# Patient Record
Sex: MALE | Race: WHITE | NOT HISPANIC OR LATINO | Employment: FULL TIME | URBAN - METROPOLITAN AREA
[De-identification: names, ages, dates, MRNs, and addresses within clinical notes are randomized per-mention and may not be internally consistent; named-entity substitution may affect disease eponyms.]

---

## 2017-01-21 ENCOUNTER — HOSPITAL ENCOUNTER (EMERGENCY)
Facility: HOSPITAL | Age: 42
Discharge: HOME | End: 2017-01-21
Attending: EMERGENCY MEDICINE
Payer: COMMERCIAL

## 2017-01-21 VITALS
WEIGHT: 160 LBS | TEMPERATURE: 98 F | SYSTOLIC BLOOD PRESSURE: 119 MMHG | DIASTOLIC BLOOD PRESSURE: 77 MMHG | OXYGEN SATURATION: 98 % | HEIGHT: 66 IN | RESPIRATION RATE: 16 BRPM | BODY MASS INDEX: 25.71 KG/M2 | HEART RATE: 75 BPM

## 2017-01-21 DIAGNOSIS — M54.9 ACUTE UPPER BACK PAIN: Primary | ICD-10-CM

## 2017-01-21 DIAGNOSIS — M54.2 NECK PAIN: ICD-10-CM

## 2017-01-21 DIAGNOSIS — M54.9 BACK PAIN: ICD-10-CM

## 2017-01-21 DIAGNOSIS — M54.9 UPPER BACK PAIN: ICD-10-CM

## 2017-01-21 DIAGNOSIS — S49.91XA RIGHT SHOULDER INJURY, INITIAL ENCOUNTER: ICD-10-CM

## 2017-01-21 PROCEDURE — 99284 EMERGENCY DEPT VISIT MOD MDM: CPT

## 2017-01-21 PROCEDURE — 25000003 PHARM REV CODE 250: Performed by: EMERGENCY MEDICINE

## 2017-01-21 PROCEDURE — 93005 ELECTROCARDIOGRAM TRACING: CPT

## 2017-01-21 RX ORDER — PANTOPRAZOLE SODIUM 20 MG/1
20 TABLET, DELAYED RELEASE ORAL DAILY
Qty: 28 TABLET | Refills: 0 | Status: SHIPPED | OUTPATIENT
Start: 2017-01-21 | End: 2017-04-25 | Stop reason: SDUPTHER

## 2017-01-21 RX ORDER — METHOCARBAMOL 500 MG/1
1000 TABLET, FILM COATED ORAL
Status: COMPLETED | OUTPATIENT
Start: 2017-01-21 | End: 2017-01-21

## 2017-01-21 RX ORDER — IBUPROFEN 400 MG/1
800 TABLET ORAL
Status: COMPLETED | OUTPATIENT
Start: 2017-01-21 | End: 2017-01-21

## 2017-01-21 RX ORDER — METHOCARBAMOL 500 MG/1
1000 TABLET, FILM COATED ORAL EVERY 6 HOURS PRN
Qty: 24 TABLET | Refills: 0 | Status: SHIPPED | OUTPATIENT
Start: 2017-01-21 | End: 2017-04-25

## 2017-01-21 RX ORDER — DOCUSATE SODIUM 100 MG/1
100 CAPSULE, LIQUID FILLED ORAL 2 TIMES DAILY
COMMUNITY
End: 2017-04-25

## 2017-01-21 RX ORDER — FAMOTIDINE 20 MG/1
20 TABLET, FILM COATED ORAL
Status: COMPLETED | OUTPATIENT
Start: 2017-01-21 | End: 2017-01-21

## 2017-01-21 RX ORDER — IBUPROFEN 600 MG/1
600 TABLET ORAL EVERY 6 HOURS PRN
Qty: 20 TABLET | Refills: 0 | Status: SHIPPED | OUTPATIENT
Start: 2017-01-21 | End: 2018-04-11

## 2017-01-21 RX ADMIN — FAMOTIDINE 20 MG: 20 TABLET ORAL at 08:01

## 2017-01-21 RX ADMIN — IBUPROFEN 800 MG: 400 TABLET ORAL at 08:01

## 2017-01-21 RX ADMIN — METHOCARBAMOL 1000 MG: 500 TABLET ORAL at 08:01

## 2017-01-21 NOTE — ED AVS SNAPSHOT
[facility] [address]  [place] [address]               Rios Buck   2017  7:20 AM   ED    Description:  Male : 1975   Department:  [facility]           Your Care was Coordinated By:     Provider Role From To    Collin Wild MD Attending Provider 17 0723 --      Reason for Visit     Shoulder Pain           Diagnoses this Visit        Comments    Acute upper back pain    -  Primary     Neck pain         Back pain         Upper back pain         Right shoulder injury, initial encounter           ED Disposition     ED Disposition Condition Comment    Discharge             To Do List           Follow-up Information     Follow up with Missy Olivo MD In 1 week.    Specialty:  Family Medicine    Contact information:    [address]  [place] [address]  [phone]         These Medications        Disp Refills Start End    pantoprazole (PROTONIX) 20 MG tablet 28 tablet 0 2017     Take 1 tablet (20 mg total) by mouth once daily. - Oral    Pharmacy: [facility]/pharmacy #[id number] - [place], [place] - [address] AT [facility] Ph #: [phone]       ibuprofen (ADVIL,MOTRIN) 600 MG tablet 20 tablet 0 2017     Take 1 tablet (600 mg total) by mouth every 6 (six) hours as needed for Pain. - Oral    Pharmacy: [facility]/pharmacy #[id number] - [place], [place] - [address] AT [facility] Ph #: [phone]       methocarbamol (ROBAXIN) 500 MG Tab 24 tablet 0 2017     Take 2 tablets (1,000 mg total) by mouth every 6 (six) hours as needed. - Oral    Pharmacy: [facility]/pharmacy #[id number] - [place] - [address] AT [facility] Ph #: [phone]         [facility]     [facility] -  Assistance  Registered nurses in the [facility] provide clinical advisement, health education, appointment booking, and other advisory services.  Call for this  free service at [phone].             Medications           Message regarding Medications     Verify the changes and/or additions to your medication regime listed below are the same as discussed with your clinician today.  If any of these changes or additions are incorrect, please notify your healthcare provider.        START taking these NEW medications        Refills    pantoprazole (PROTONIX) 20 MG tablet 0    Sig: Take 1 tablet (20 mg total) by mouth once daily.    Class: Print    Route: Oral    ibuprofen (ADVIL,MOTRIN) 600 MG tablet 0    Sig: Take 1 tablet (600 mg total) by mouth every 6 (six) hours as needed for Pain.    Class: Print    Route: Oral    methocarbamol (ROBAXIN) 500 MG Tab 0    Sig: Take 2 tablets (1,000 mg total) by mouth every 6 (six) hours as needed.    Class: Print    Route: Oral      These medications were administered today        Dose Freq    ibuprofen tablet 800 mg 800 mg ED 1 Time    Sig: Take 2 tablets (800 mg total) by mouth ED 1 Time.    Class: Normal    Route: Oral    famotidine tablet 20 mg 20 mg ED 1 Time    Sig: Take 1 tablet (20 mg total) by mouth ED 1 Time.    Class: Normal    Route: Oral    methocarbamol tablet 1,000 mg 1,000 mg ED 1 Time    Sig: Take 2 tablets (1,000 mg total) by mouth ED 1 Time.    Class: Normal    Route: Oral      STOP taking these medications     sertraline (ZOLOFT) 25 MG tablet TAKE 1 TABLET (25 MG TOTAL) BY MOUTH ONCE DAILY.    cholecalciferol, vitamin D3, 1,000 unit capsule Take 1,000 Units by mouth once daily.    FIBER, HERBAL, ORAL Take 1 tablet by mouth once daily.    multivitamin capsule Take 1 capsule by mouth once daily.    vitamin A 49494 UNIT capsule Take 10,000 Units by mouth once daily.    LINZESS 145 mcg Cap Take 145 mcg by mouth once daily.           Verify that the below list of medications is an accurate representation of the medications you are currently taking.  If none reported, the list may be blank. If incorrect, please contact  "your healthcare provider. Carry this list with you in case of emergency.           Current Medications     docusate sodium (COLACE) 100 MG capsule Take 100 mg by mouth 2 (two) times daily.    acetaminophen (TYLENOL) 325 MG tablet Take 325 mg by mouth every 6 (six) hours as needed for Pain.    ibuprofen (ADVIL,MOTRIN) 600 MG tablet Take 1 tablet (600 mg total) by mouth every 6 (six) hours as needed for Pain.    methocarbamol (ROBAXIN) 500 MG Tab Take 2 tablets (1,000 mg total) by mouth every 6 (six) hours as needed.    pantoprazole (PROTONIX) 20 MG tablet Take 1 tablet (20 mg total) by mouth once daily.           Clinical Reference Information           Your Vitals Were     BP Pulse Temp Resp Height Weight    121/77 (BP Location: Left arm, Patient Position: Sitting) 83 98.1 °F (36.7 °C) (Oral) 15 5' 6" (1.676 m) 72.6 kg (160 lb)    SpO2 BMI             98% 25.82 kg/m2         Allergies as of 1/21/2017     No Known Allergies      Immunizations Administered on Date of Encounter - 1/21/2017     None      ED Micro, Lab, POCT     None      ED Imaging Orders     Start Ordered       Status Ordering Provider    01/21/17 0743 01/21/17 0742  X-Ray Shoulder Complete 2 View Right  1 time imaging      Final result     01/21/17 0739 01/21/17 0739  X-Ray Cervical Spine AP And Lateral  1 time imaging      Final result     01/21/17 0739 01/21/17 0739  X-Ray Thoracic Spine AP Lateral  1 time imaging      Final result     01/21/17 0739 01/21/17 0739  X-Ray Chest PA And Lateral  1 time imaging      Final result       Discharge References/Attachments     BACK PAIN (ACUTE OR CHRONIC) (ENGLISH)       [facility] complies with applicable Federal civil rights laws and does not discriminate on the basis of race, color, national origin, age, disability, or sex.        Language Assistance Services     ATTENTION: Language assistance services are available, free of charge. Please call [phone].      ATENCIÓN: Si habla " español, tiene a san disposición servicios gratuitos de asistencia lingüística. Llame al [phone].     FRACISCO Ý: N?u b?n nói Ti?ng Vi?t, có các d?ch v? h? tr? ngôn ng? mi?n phí dành cho b?n. G?i s? [phone].

## 2017-01-21 NOTE — ED PROVIDER NOTES
Chief complaint:  Shoulder Pain      HPI:  Rios Buck is a 41 y.o. male presenting with upper back pain which began acutely 2 days ago.  He states that he began in the middle of his upper back while driving.  He denies any recent heavy lifting or trauma.  He is unclear of the etiology.  He states that he is healthy and exercises frequently with weight lifting.  Over the course of the past couple of days, the pain has progressively worsened and then gradually improved.  However, it now radiates into his right shoulder and right neck with some associated spasming of his right upper arm.  He states the pain is moderate and constant.  He has taken Aleve and Tylenol with some relief.  He is unclear of the etiology.  He has no other complaints.  Incidentally, the patient denies any leg swelling, leg pain, recent long airplane rides or car rides, or a significant family history of sudden cardiac death, cardiac disease, or PE.  He is also a nonsmoker.    ROS: As per HPI and below:  General: Denies generalized weakness.  HENT: Denies sore throat.  Denies earache.  Denies rhinorrhea.  Cardiovascular: Denies chest pain.  Denies shortness of breath.  Denies orthopnea.  Denies dyspnea on exertion.  Respiratory: Denies shortness of breath.  Denies wheezing.  Denies coughing.  The pain is not reproducible with deep inspiration.  GI: Denies abdominal pain.  Denies nausea.  Denies vomiting.  Neuro: Denies numbness.  Denies focal weakness.  Musculoskeletal: Reports right-sided neck pain.  Reports upper back pain.  Reports right upper extremity pain.  Denies extremity swelling.          Review of patient's allergies indicates:  No Known Allergies    Patient's Medications   New Prescriptions    No medications on file   Previous Medications    ACETAMINOPHEN (TYLENOL) 325 MG TABLET    Take 325 mg by mouth every 6 (six) hours as needed for Pain.    DOCUSATE SODIUM (COLACE) 100 MG CAPSULE    Take 100 mg by mouth 2 (two) times  daily.   Modified Medications    No medications on file   Discontinued Medications    CHOLECALCIFEROL, VITAMIN D3, 1,000 UNIT CAPSULE    Take 1,000 Units by mouth once daily.    FIBER, HERBAL, ORAL    Take 1 tablet by mouth once daily.    LINZESS 145 MCG CAP    Take 145 mcg by mouth once daily.    MULTIVITAMIN CAPSULE    Take 1 capsule by mouth once daily.    PANTOPRAZOLE (PROTONIX) 40 MG TABLET    TAKE 1 TABLET BY MOUTH EVERY DAY    SERTRALINE (ZOLOFT) 25 MG TABLET    TAKE 1 TABLET (25 MG TOTAL) BY MOUTH ONCE DAILY.    VITAMIN A 47876 UNIT CAPSULE    Take 10,000 Units by mouth once daily.       PMH:  As per HPI and below:  Past Medical History   Diagnosis Date    Colon polyp     Epidermoid cyst of testis      left    H. pylori infection     Hydrocele, left     IBS (irritable bowel syndrome)     Peptic ulcer disease      Past Surgical History   Procedure Laterality Date    Tonsillectomy      Upper gastrointestinal endoscopy  05/2015     repeat in 8 weeks to reassess ulcer for healing    Colonoscopy  06/2015     repeat in 5 years for surveillance       Social History     Social History    Marital status:      Spouse name: N/A    Number of children: N/A    Years of education: N/A     Social History Main Topics    Smoking status: Never Smoker    Smokeless tobacco: None    Alcohol use No    Drug use: No    Sexual activity: Not Asked     Other Topics Concern    None     Social History Narrative       Family History   Problem Relation Age of Onset    Diabetes Mother     Heart disease Mother 50     CABG x3    Cancer Maternal Grandfather 64     colon cancer    Colon cancer Maternal Grandfather 65    Crohn's disease Neg Hx     Ulcerative colitis Neg Hx     Celiac disease Neg Hx        Physical Exam:    Vitals:    01/21/17 0717   BP: 121/77   Pulse: 83   Resp: 15   Temp: 98.1 °F (36.7 °C)     General: Mild distress with pain.  Well-nourished.  Well-developed.  Alert and oriented x3.  HENT:  Moist mucous membranes.  Normocephalic atraumatic.  No splinting.  No reproducible pain with deep inspiration.  Cardiovascular: Regular rate and rhythm.  No murmurs, rubs, or gallops.  Brisk capillary fill.  2+ distal pulses.  Respiratory: Clear to auscultation bilaterally.  No wheezes, rales, or rhonchi.  No respiratory distress.  Abdomen: Soft.  Nontender.  Nondistended.  No guarding.  No rebound.  No masses.  No abdominal bruit auscultated.  Skin: No rashes.  No lesions.  No pallor.  No jaundice.  Neuro: Cranial nerves II through XII grossly intact.  Moving all extremities equally.  No sensory deficits.  Strength 5 out of 5 in all 4 extremities.  Musculoskeletal: No midline C-spine, T-spine, or L-spine tenderness to palpation, crepitus, or step-offs.  No muscular tenderness to palpation is noted along the neck or back.  Full range of motion of the extremities with some reproducible pain with movement of the right shoulder.        Labs Reviewed - No data to display    Current Discharge Medication List      CONTINUE these medications which have NOT CHANGED    Details   docusate sodium (COLACE) 100 MG capsule Take 100 mg by mouth 2 (two) times daily.      acetaminophen (TYLENOL) 325 MG tablet Take 325 mg by mouth every 6 (six) hours as needed for Pain.         STOP taking these medications       cholecalciferol, vitamin D3, 1,000 unit capsule Comments:   Reason for Stopping:         FIBER, HERBAL, ORAL Comments:   Reason for Stopping:         LINZESS 145 mcg Cap Comments:   Reason for Stopping:         multivitamin capsule Comments:   Reason for Stopping:         pantoprazole (PROTONIX) 40 MG tablet Comments:   Reason for Stopping:         sertraline (ZOLOFT) 25 MG tablet Comments:   Reason for Stopping:         vitamin A 28473 UNIT capsule Comments:   Reason for Stopping:               Orders Placed This Encounter   Procedures    X-Ray Cervical Spine AP And Lateral    X-Ray Thoracic Spine AP Lateral    X-Ray  Chest PA And Lateral    EKG 12-LEAD       Imaging Results     None              MDM:    41 y.o. male with upper back, neck, and right upper extremity pain.  The patient's history and physical exam are not consistent with ACS or PE.  I do not feel strongly that a workup for either is necessary.  I will order a screening EKG.  I will also order a chest x-ray to evaluate the patient's mediastinum and lungs.  X-rays of the cervical spine and thoracic spine have been ordered to evaluate for DJD or other pathology.  I will also order a screening right shoulder x-ray.  In the meantime, I will provide the patient with ibuprofen, Pepcid, and Robaxin.  I will reassess.    8:20 AM  EKG: I interpreted this EKG myself.  Normal sinus rhythm.  Normal axis.  Normal intervals.  No acute ischemic changes.    8:59 AM  Shoulder x-ray: I interpreted this x-ray myself.  There is no evidence of fracture or dislocation.  Chest x-ray: I interpreted that this chest x-ray myself.  No obvious infiltrate.  No effusion.  No cardiomegaly.  No widened mediastinum.  No pneumothorax.  Cervical/thoracic x-ray: I interpreted this x-ray myself.  DJD is noted with osteophytes.    9:04 AM  I believe that the patient's symptoms are due to degenerative changes of the neck and upper back.  Clinically, I highly doubt ACS or PE.  I do not feel that any further emergent workup is necessary.  The patient's EKG and chest x-ray are normal.  I will provide the patient with Protonix, ibuprofen, and Robaxin.  He has been instructed to follow up closely with his primary care physician.  At this time, I believe that he is clinically stable for discharge.    Diagnoses:    1. Acute upper back pain    2. Neck pain    3. Back pain    4. Upper back pain    5. Right shoulder injury, initial encounter           Collin Wild MD  01/21/17 2933

## 2017-04-24 ENCOUNTER — TELEPHONE (OUTPATIENT)
Dept: GASTROENTEROLOGY | Facility: CLINIC | Age: 42
End: 2017-04-24

## 2017-04-24 NOTE — TELEPHONE ENCOUNTER
----- Message from Keiko Sandoval sent at 4/24/2017 10:23 AM CDT -----  Contact: Nick Bukc   Patient wife called and is attempting to get a sooner appt time than the currently scheduled 05/10/2017 date - Please call Wife Nick at [phone] to advise if and when patient can be seen sooner - Wife advised the patient's symptoms are getting worse.

## 2017-04-25 ENCOUNTER — OFFICE VISIT (OUTPATIENT)
Dept: GASTROENTEROLOGY | Facility: CLINIC | Age: 42
End: 2017-04-25
Payer: COMMERCIAL

## 2017-04-25 VITALS
RESPIRATION RATE: 18 BRPM | HEIGHT: 66 IN | BODY MASS INDEX: 28.09 KG/M2 | DIASTOLIC BLOOD PRESSURE: 71 MMHG | SYSTOLIC BLOOD PRESSURE: 113 MMHG | WEIGHT: 174.81 LBS | HEART RATE: 90 BPM

## 2017-04-25 DIAGNOSIS — R11.2 NON-INTRACTABLE VOMITING WITH NAUSEA, UNSPECIFIED VOMITING TYPE: ICD-10-CM

## 2017-04-25 DIAGNOSIS — K58.1 IRRITABLE BOWEL SYNDROME WITH CONSTIPATION: ICD-10-CM

## 2017-04-25 DIAGNOSIS — R10.9 ABDOMINAL CRAMPING: ICD-10-CM

## 2017-04-25 DIAGNOSIS — Z87.11 HISTORY OF PEPTIC ULCER: ICD-10-CM

## 2017-04-25 DIAGNOSIS — R14.0 ABDOMINAL BLOATING: Primary | ICD-10-CM

## 2017-04-25 DIAGNOSIS — R10.2 SUPRAPUBIC PAIN: ICD-10-CM

## 2017-04-25 DIAGNOSIS — Z86.19 HISTORY OF HELICOBACTER PYLORI INFECTION: ICD-10-CM

## 2017-04-25 DIAGNOSIS — Z86.2 HISTORY OF ANEMIA: ICD-10-CM

## 2017-04-25 PROCEDURE — 99999 PR PBB SHADOW E&M-EST. PATIENT-LVL III: CPT | Mod: PBBFAC,,, | Performed by: NURSE PRACTITIONER

## 2017-04-25 PROCEDURE — 99214 OFFICE O/P EST MOD 30 MIN: CPT | Mod: S$GLB,,, | Performed by: NURSE PRACTITIONER

## 2017-04-25 PROCEDURE — 1160F RVW MEDS BY RX/DR IN RCRD: CPT | Mod: S$GLB,,, | Performed by: NURSE PRACTITIONER

## 2017-04-25 RX ORDER — LUBIPROSTONE 8 UG/1
8 CAPSULE ORAL 2 TIMES DAILY WITH MEALS
Qty: 60 CAPSULE | Refills: 2 | Status: SHIPPED | OUTPATIENT
Start: 2017-04-25 | End: 2020-09-01 | Stop reason: ALTCHOICE

## 2017-04-25 RX ORDER — PANTOPRAZOLE SODIUM 40 MG/1
40 TABLET, DELAYED RELEASE ORAL DAILY
Qty: 30 TABLET | Refills: 3 | Status: SHIPPED | OUTPATIENT
Start: 2017-04-25 | End: 2017-07-28 | Stop reason: SDUPTHER

## 2017-04-25 NOTE — PROGRESS NOTES
Subjective:       Patient ID: Rios Buck is a 41 y.o. male Body mass index is 28.22 kg/(m^2).    Chief Complaint: Emesis; Nausea; Bloated; and Gas    Established patient of Dr. Saez & myself.    GI Problem   The primary symptoms include fatigue, abdominal pain, nausea (occurs before cramping occurs), vomiting (twice last week; emesis of food contents, denies bright red blood or coffee ground emesis) and diarrhea (once a week; is constipated with no bowel movements for a week and when he does have a bowel movement it starts off with a few small pellet-like stool and then progresses to large amount of loose stool). Primary symptoms do not include fever, weight loss, melena, hematemesis, hematochezia or dysuria. The illness began more than 7 days ago (constipation started several years ago). The problem has not changed (had improved after our last visit but recurred over the past few months) since onset.  The abdominal pain began more than 2 days ago (recurred a few months ago). The abdominal pain has been unchanged since its onset. The abdominal pain is located in the suprapubic region and LLQ (described cramping pain before diarrhea). The abdominal pain does not radiate. Relieved by: lying; worse after eating.   The illness is also significant for bloating and constipation (chronic: bowel movements once a week starts with hard stool and progresses to loose stool; past: linzess (diarrhea), lactulose (no relief); currently taking colace prn). The illness does not include dysphagia, odynophagia or back pain. Associated symptoms comments: Lots of belching and flatulence recently; early satiety  Takes ibuprofen about twice a week prn bodyaches  Past treatment: bentyl caused dizziness- unable to continue taking. Significant associated medical issues include GERD (controlled with protonix 20 mg once daily and diet/lifestyle modifications, occasional breakthrough if he eats certain trigger foods), PUD, irritable  bowel syndrome and hemorrhoids. Associated medical issues do not include inflammatory bowel disease, gastric bypass, bowel resection or diverticulitis. Associated medical issues comments: history of h pylori.     Review of Systems   Constitutional: Positive for fatigue. Negative for appetite change, fever, unexpected weight change and weight loss.   HENT: Negative for mouth sores, trouble swallowing and voice change.    Respiratory: Negative for choking, chest tightness and shortness of breath.    Cardiovascular: Negative for palpitations.   Gastrointestinal: Positive for abdominal pain, bloating, constipation (chronic: bowel movements once a week starts with hard stool and progresses to loose stool; past: linzess (diarrhea), lactulose (no relief); currently taking colace prn), diarrhea (once a week; is constipated with no bowel movements for a week and when he does have a bowel movement it starts off with a few small pellet-like stool and then progresses to large amount of loose stool), nausea (occurs before cramping occurs) and vomiting (twice last week; emesis of food contents, denies bright red blood or coffee ground emesis). Negative for anal bleeding, blood in stool, dysphagia, hematemesis, hematochezia, melena and rectal pain.   Genitourinary: Negative for difficulty urinating, dysuria, flank pain and urgency.   Musculoskeletal: Negative for back pain.   Hematological:        Reports remains slightly anemic.       Past Medical History:   Diagnosis Date    Colon polyp     Epidermoid cyst of testis     left    H. pylori infection     Hydrocele, left     IBS (irritable bowel syndrome)     Peptic ulcer disease      Past Surgical History:   Procedure Laterality Date    COLONOSCOPY  06/2015    repeat in 5 years for surveillance    TONSILLECTOMY      UPPER GASTROINTESTINAL ENDOSCOPY  05/2015    repeat in 8 weeks to reassess ulcer for healing     Family History   Problem Relation Age of Onset    Diabetes  Mother     Heart disease Mother 50     CABG x3    Cancer Maternal Grandfather 64     colon cancer    Colon cancer Maternal Grandfather 65    Crohn's disease Neg Hx     Ulcerative colitis Neg Hx     Celiac disease Neg Hx      Wt Readings from Last 10 Encounters:   04/25/17 79.3 kg (174 lb 13.2 oz)   01/21/17 72.6 kg (160 lb)   07/14/16 74.3 kg (163 lb 12.8 oz)   06/10/16 74.3 kg (163 lb 12.8 oz)   01/26/16 76.3 kg (168 lb 3.4 oz)   07/07/15 68 kg (150 lb)   07/06/15 68 kg (150 lb)   06/15/15 70.3 kg (155 lb)   05/18/15 72.6 kg (160 lb)   05/14/15 73 kg (160 lb 14.4 oz)     Lab Results   Component Value Date    WBC 6.49 07/14/2016    HGB 13.3 (L) 07/14/2016    HCT 38.0 (L) 07/14/2016    MCV 88 07/14/2016     07/14/2016     CMP  Sodium   Date Value Ref Range Status   07/14/2016 141 136 - 145 mmol/L Final     Potassium   Date Value Ref Range Status   07/14/2016 4.2 3.5 - 5.1 mmol/L Final     Chloride   Date Value Ref Range Status   07/14/2016 104 95 - 110 mmol/L Final     CO2   Date Value Ref Range Status   07/14/2016 28 23 - 29 mmol/L Final     Glucose   Date Value Ref Range Status   07/14/2016 129 (H) 70 - 110 mg/dL Final     BUN, Bld   Date Value Ref Range Status   07/14/2016 15 6 - 20 mg/dL Final     Creatinine   Date Value Ref Range Status   07/14/2016 1.1 0.5 - 1.4 mg/dL Final     Calcium   Date Value Ref Range Status   07/14/2016 8.9 8.7 - 10.5 mg/dL Final     Total Protein   Date Value Ref Range Status   07/14/2016 6.7 6.0 - 8.4 g/dL Final     Albumin   Date Value Ref Range Status   07/14/2016 4.0 3.5 - 5.2 g/dL Final     Total Bilirubin   Date Value Ref Range Status   07/14/2016 0.5 0.1 - 1.0 mg/dL Final     Comment:     For infants and newborns, interpretation of results should be based  on gestational age, weight and in agreement with clinical  observations.  Premature Infant recommended reference ranges:  Up to 24 hours.............<8.0 mg/dL  Up to 48 hours............<12.0 mg/dL  3-5  "days..................<15.0 mg/dL  6-29 days.................<15.0 mg/dL       Alkaline Phosphatase   Date Value Ref Range Status   07/14/2016 73 55 - 135 U/L Final     AST   Date Value Ref Range Status   07/14/2016 17 10 - 40 U/L Final     ALT   Date Value Ref Range Status   07/14/2016 15 10 - 44 U/L Final     Anion Gap   Date Value Ref Range Status   07/14/2016 9 8 - 16 mmol/L Final     eGFR if    Date Value Ref Range Status   07/14/2016 >60.0 >60 mL/min/1.73 m^2 Final     eGFR if non    Date Value Ref Range Status   07/14/2016 >60.0 >60 mL/min/1.73 m^2 Final     Comment:     Calculation used to obtain the estimated glomerular filtration  rate (eGFR) is the CKD-EPI equation. Since race is unknown   in our information system, the eGFR values for   -American and Non--American patients are given   for each creatinine result.       Lab Results   Component Value Date    TSH 0.431 01/31/2016     Lab Results   Component Value Date    EJDFKDVZ10 1624 (H) 06/16/2016     Lab Results   Component Value Date    OCCULTBLOOD Negative 07/25/2016     Reviewed prior medical records including radiology report of 7/14/16 abdominal x-ray, 7/25/16 h pylori stool & other stool studies (negative), 1/31/16 celiac blood work (negative), & endoscopy history (see surgical history).    6/15/2015 Colonoscopy was reviewed and procedure report states:   " Impression:          - Non-bleeding internal hemorrhoids.                       - The rectum, sigmoid colon, descending colon,                        transverse colon, ascending colon, cecum,                        appendiceal orifice and ileocecal valve are normal.                        Biopsied.                       - The examined portion of the ileum was normal.  Recommendation:      - Patient has a contact number available for                        emergencies. The signs and symptoms of potential                        delayed complications " "were discussed with the                        patient. Return to normal activities tomorrow.                        Written discharge instructions were provided to the                        patient.                       - High fiber diet.                       - Continue present medications.                       - Await pathology results.                       - Repeat colonoscopy in 5 years for surveillance.                       - Discharge patient to home (ambulatory).                       - Return to my office after studies are complete. ".  Biopsy results:   "1,2. COLON, RANDOM RIGHT AND RANDOM LEFT (BIOPSIES):  -Colonic mucosa with no significant pathologic changes  -Negative for active inflammation  -No features of microscopic colitis"    5/2015 EGD was reviewed and procedure report states:   " Impression:          1. Normal upper esophagus                       2. Single tongue of salmon colored tissue from 38 to                        40 cm                       3. Erosive gastritis                       4. Peptic ulcer disease                       5. Normal duodenum  Recommendation:      - Patient has a contact number available for                        emergencies. The signs and symptoms of potential                        delayed complications were discussed with the                        patient. Return to normal activities tomorrow.                        Written discharge instructions were provided to the                        patient.                       - Discharge patient to home (ambulatory).                       1. Daily PPI                       2. Will give carafate x 10 days                       3. Avoid NSAIDs                       4. Follow up in my office in 8 weeks                       5. Repeat EGD in 8-12 weeks to examine for ulcer                        healing                       6. Further recommendations to follow after above ".  Biopsy results:   "Specimen #2: " "Positive for H. pylori species by immunostain.  Specimen #3: Positive for H. pylori species by immunostain.  Stain controls are reviewed and are appropriately reactive"  "1. Duodenum, biopsy:  Unremarkable small bowel mucosa with adequate villi.  Negative for active inflammation. No histopathologic evidence of sprue.  Negative for dysplasia.  2. Stomach, antrum/body, biopsy:  Mild acute erosive gastritis with reactive/regenerative changes.  Immunostain for H. pylori species is pending; supplemental report to follow.  Negative for dysplasia.  3. Stomach, cardia, biopsy:  Moderate chronic gastritis with reactive/regenerative changes.  Negative for active inflammation.  Immunostain for H. pylori species is pending; supplemental report to follow.  Negative for dysplasia.  4. Esophagus, biopsy:  Squamous and gastric-type mucosa.  Negative for intraepithelial eosinophils and neutrophils.  Moderate chronic gastritis with reactive/regenerative changes.  Negative for intestinal metaplasia, dysplasia, or malignancy."  Objective:      Physical Exam   Constitutional: He is oriented to person, place, and time. He appears well-developed and well-nourished. No distress.   HENT:   Head: Atraumatic.   Mouth/Throat: Oropharynx is clear and moist and mucous membranes are normal. No oral lesions. No oropharyngeal exudate.   Eyes: Conjunctivae are normal. Pupils are equal, round, and reactive to light. No scleral icterus.   Neck: Normal range of motion. Neck supple. No tracheal deviation present. No thyromegaly present.   Cardiovascular: Normal rate and regular rhythm.    Pulmonary/Chest: Effort normal and breath sounds normal. No respiratory distress. He has no wheezes. He has no rhonchi. He has no rales. He exhibits no tenderness.   Abdominal: Soft. Normal appearance and bowel sounds are normal. He exhibits no distension, no abdominal bruit, no ascites and no mass. There is no hepatosplenomegaly. There is no tenderness. There is no " rigidity, no rebound, no guarding, no tenderness at McBurney's point and negative Ty's sign. No hernia.   Lymphadenopathy:     He has no cervical adenopathy.   Neurological: He is alert and oriented to person, place, and time.   Skin: Skin is warm and dry. No rash noted. He is not diaphoretic. No erythema. No pallor.   Non-jaundiced   Psychiatric: He has a normal mood and affect. His behavior is normal.   Nursing note and vitals reviewed.      Assessment:       1. Abdominal bloating    2. Non-intractable vomiting with nausea, unspecified vomiting type    3. Irritable bowel syndrome with constipation    4. Suprapubic pain    5. History of peptic ulcer    6. Abdominal cramping    7. History of Helicobacter pylori infection    8. History of anemia        Plan:       Abdominal bloating  -  START   lubiprostone (AMITIZA) 8 MCG Cap; Take 1 capsule (8 mcg total) by mouth 2 (two) times daily with meals.  Dispense: 60 capsule; Refill: 2  -     CT Abdomen Pelvis With Contrast; Future; Expected date: 4/25/17  -     Case request GI: ESOPHAGOGASTRODUODENOSCOPY (EGD), - schedule EGD, discussed procedure with patient, verbalized understanding  - recommended OTC simethicone as directed, such as Phazyme or Gas-x  -discussed with patient about low gas diet: Reduce or eliminate these foods from your diet: Broccoli, Cauliflower, Maurice sprouts, Cabbage, Cooked dried beans, Carbonated beverages (sparkling water, soda, beer, champagne)  Other Causes Of Excess Gas Include:   1) EATING TOO FAST or TALKING WHILE YOU CHEW may cause you to swallow air. This increases the amount of gas in the stomach and may worsen your symptoms.  --> Chew each mouthful completely before swallowing. Take your time.  2) OVEREATING may increase the feeling of being bloated and cause more gas.  --> When you are full, stop eating.  3) CONSTIPATION can increase the amount of normal intestinal gas.  --> Avoid constipation by increasing the amount of fiber in  your diet by including whole cereal grains, fresh vegetables (except those in the above list) and fresh fruits. High-fiber foods absorb water and carry it out of the body. When increasing the amount of fiber in your diet, you also need to increase the amount of water that you drink. You should drink at least eight 8-ounce glasses of water (two quarts) per day.    Non-intractable vomiting with nausea, unspecified vomiting type  -     CBC auto differential; Future; Expected date: 4/25/17  -     Comprehensive metabolic panel; Future; Expected date: 4/25/17  -     Urinalysis; Future; Expected date: 4/25/17  -     CT Abdomen Pelvis With Contrast; Future; Expected date: 4/25/17  -  INCREASE to   pantoprazole (PROTONIX) 40 MG tablet; Take 1 tablet (40 mg total) by mouth once daily.  Dispense: 30 tablet; Refill: 3  -     Case request GI: ESOPHAGOGASTRODUODENOSCOPY (EGD), - schedule EGD, discussed procedure with patient, verbalized understanding  - declined anti-emetic    Irritable bowel syndrome with constipation  -  START   lubiprostone (AMITIZA) 8 MCG Cap; Take 1 capsule (8 mcg total) by mouth 2 (two) times daily with meals.  Dispense: 60 capsule; Refill: 2  -     CT Abdomen Pelvis With Contrast; Future; Expected date: 4/25/17  Recommended daily exercise, adequate water intake (six 8-oz glasses of water daily), and high fiber diet. OTC fiber supplements are recommended if diet does not reach daily fiber goal (25 grams daily), such as Metamucil, Citrucel, or FiberCon (take as directed, separate from other oral medications by >2 hours).  -Recommend taking a OTC stool softener such as Colace as directed to avoid hard stools and straining with bowel movements PRN  - discussed that diarrhea may be overflow from constipation  - discussed diagnosis with patient in depth, reviewed and gave IBS educational information in AVS, patient verbalized understanding  - recommended OTC probiotics, such as Align or Culturelle, as  directed  - avoid lactose  - drink adequate water intake  -smaller, more frequent meals  -avoid trigger foods    Suprapubic pain  -     CBC auto differential; Future; Expected date: 4/25/17  -     Comprehensive metabolic panel; Future; Expected date: 4/25/17  -     Urinalysis; Future; Expected date: 4/25/17  -     CT Abdomen Pelvis With Contrast; Future; Expected date: 4/25/17  - possible referral to urologist    History of peptic ulcer  -  INCREASE to   pantoprazole (PROTONIX) 40 MG tablet; Take 1 tablet (40 mg total) by mouth once daily.  Dispense: 30 tablet; Refill: 3, - take in the morning before breakfast, discussed about possible long term use of medication (prefer to use lowest effective dose or discontinuing if possible) and discussed the risks & benefits with taking a reflux medication long term, and to take OTC calcium and vitamin d supplements as directed (such as Citracal +D), pt verbalized understanding  -discussed about the different types of medications used to treat reflux and how to use them, antacids can be used PRN for breakthrough heartburn symptoms by reducing stomach acid that is already produced, H2 blockers (zantac) work by limiting the amount acid production, & PPI's work to block acid production and are taken daily, patient verbalized understanding.  -Educated patient on lifestyle modifications to help control/reduce reflux/abdominal pain including: avoid large meals, avoid eating within 2-3 hours of bedtime (avoid late night eating & lying down soon after eating), elevate head of bed if nocturnal symptoms are present, smoking cessation (if current smoker), & weight loss (if overweight).   -Educated to avoid known foods which trigger reflux symptoms & to minimize/avoid high-fat foods, chocolate, caffeine, citrus, alcohol, & tomato products.  -Advised to avoid/limit use of NSAID's, since they can cause GI upset, bleeding, and/or ulcers. If needed, take with food.  -     Case request GI:  ESOPHAGOGASTRODUODENOSCOPY (EGD), - schedule EGD, discussed procedure with patient, verbalized understanding    Abdominal cramping  -     Case request GI: ESOPHAGOGASTRODUODENOSCOPY (EGD), - schedule EGD, discussed procedure with patient, verbalized understanding    History of Helicobacter pylori infection  -     Case request GI: ESOPHAGOGASTRODUODENOSCOPY (EGD), - schedule EGD, discussed procedure with patient & testing for H. Pylori typically performed during EGD (if not can do lab testing), patient verbalized understanding    History of Anemia  - schedule EGD, discussed procedure with patient, verbalized understanding  -follow-up with PCP and/or hematology for continued evaluation and management    Return in about 1 month (around 5/25/2017), or if symptoms worsen or fail to improve.    If no improvement in symptoms or symptoms worsen, call/follow-up at clinic or go to ER.

## 2017-04-25 NOTE — MR AVS SNAPSHOT
[facility]   Elsy BELL, [address]  [place] [address]  Phone: [phone]                  Rios Buck   2017 2:30 PM   Office Visit    Description:  Male : 1975   Provider:  PEG Lafleur   Department:  [facility]           Reason for Visit     Emesis     Nausea     Bloated     Gas           Diagnoses this Visit        Comments    Abdominal bloating    -  Primary     Non-intractable vomiting with nausea, unspecified vomiting type         Irritable bowel syndrome with constipation         Suprapubic pain         History of peptic ulcer         Abdominal cramping         History of Helicobacter pylori infection                To Do List           Goals (5 Years of Data)     None      Follow-Up and Disposition     Return in about 2 months (around 2017), or if symptoms worsen or fail to improve.       These Medications        Disp Refills Start End    lubiprostone (AMITIZA) 8 MCG Cap 60 capsule 2 2017     Take 1 capsule (8 mcg total) by mouth 2 (two) times daily with meals. - Oral    Pharmacy: [facility]/pharmacy #[id number] - [place], [place] - [address] [facility] [address] [facility] Ph #: [phone]       pantoprazole (PROTONIX) 40 MG tablet 30 tablet 3 2017     Take 1 tablet (40 mg total) by mouth once daily. - Oral    Pharmacy: [facility]/pharmacy #[id number] - [place], [place] - [address] [facility] Ph #: [phone]         [facility]     [facility] On Call Nurse Care Line -  Assistance  Unless otherwise directed by your provider, please contact [facility] On-[facility], our nurse care line that is available for  assistance.     Registered nurses in the [facility] provide: appointment scheduling, clinical advisement, health education, and other advisory services.  Call: [phone] (toll free)               Medications           Message regarding Medications     Verify the changes and/or  "additions to your medication regime listed below are the same as discussed with your clinician today.  If any of these changes or additions are incorrect, please notify your healthcare provider.        START taking these NEW medications        Refills    lubiprostone (AMITIZA) 8 MCG Cap 2    Sig: Take 1 capsule (8 mcg total) by mouth 2 (two) times daily with meals.    Class: Normal    Route: Oral      CHANGE how you are taking these medications     Start Taking Instead of    pantoprazole (PROTONIX) 40 MG tablet pantoprazole (PROTONIX) 20 MG tablet    Dosage:  Take 1 tablet (40 mg total) by mouth once daily. Dosage:  Take 1 tablet (20 mg total) by mouth once daily.    Reason for Change:  Reorder       STOP taking these medications     methocarbamol (ROBAXIN) 500 MG Tab Take 2 tablets (1,000 mg total) by mouth every 6 (six) hours as needed.           Verify that the below list of medications is an accurate representation of the medications you are currently taking.  If none reported, the list may be blank. If incorrect, please contact your healthcare provider. Carry this list with you in case of emergency.           Current Medications     acetaminophen (TYLENOL) 325 MG tablet Take 325 mg by mouth every 6 (six) hours as needed for Pain.    docusate sodium (COLACE) 50 MG capsule Take by mouth 2 (two) times daily as needed for Constipation.    ibuprofen (ADVIL,MOTRIN) 600 MG tablet Take 1 tablet (600 mg total) by mouth every 6 (six) hours as needed for Pain.    pantoprazole (PROTONIX) 40 MG tablet Take 1 tablet (40 mg total) by mouth once daily.    lubiprostone (AMITIZA) 8 MCG Cap Take 1 capsule (8 mcg total) by mouth 2 (two) times daily with meals.           Clinical Reference Information           Your Vitals Were     BP Pulse Resp Height Weight BMI    113/71 (BP Location: Left arm, Patient Position: Sitting, BP Method: Automatic) 90 18 5' 6" (1.676 m) 79.3 kg (174 lb 13.2 oz) 28.22 kg/m2      Blood Pressure          " Most Recent Value    BP  113/71      Allergies as of 4/25/2017     No Known Allergies      Immunizations Administered on Date of Encounter - 4/25/2017     None      Orders Placed During Today's Visit     Future Labs/Procedures Expected by Expires    CBC auto differential  4/25/2017 6/24/2018    Comprehensive metabolic panel  4/25/2017 6/24/2018    CT Abdomen Pelvis With Contrast  4/25/2017 4/25/2018    Urinalysis  4/25/2017 6/24/2018      Instructions      Excess Gas  Certain foods produce gas when digested. In some people, these foods make an excessive amount of gas. This may cause bloating, burping, or increased gas passing through the rectum (flatulence).    Foods that cause gas  The following foods are more likely to cause this problem. Limit them, or remove them from your diet:  · Broccoli  · Cauliflower  · Porter sprouts  · Cabbage  · Cooked dried beans  · Fizzy (carbonated) drinks, such as sparkling water, soda, beer, and champagne  Other causes  Other causes of excess gas include:  · Eating too fast or talking while you chew. This may cause you to swallow air. This increases the amount of gas in your stomach. And it may make your symptoms worse. Chew each mouthful completely before you swallow. Take your time.  · Chewing on gum or sucking on hard candy. These cause you to swallow more often. And some of what you are swallowing is air. This leads to more gas in your stomach. Avoid chewing gum and hard candy.  · Overeating. This may increase the feeling of being bloated and cause more gas. When you are full, stop eating.   · Being constipated. This can increase the amount of normal intestinal gas. Avoid constipation by getting more fiber in your diet. Good sources of fiber include whole-grain cereal, fresh vegetables (except those in the above list), and fresh fruits. High-fiber foods absorb water and carry it out of the body. When adding more fiber to your diet, you also need to drink more water. You should  "drink at least 8, 8-ounce glasses of water (2 quarts) per day.  Date Last Reviewed: 8/1/2016 © 2000-2016 [employer]. [address] [place] [address], [place] [address]. All rights reserved. This information is not intended as a substitute for professional medical care. Always follow your healthcare professional's instructions.        How to Control Nausea and Vomiting     Taken before meals, medicines can help ease nausea.    Nausea is feeling that you need to throw up. Throwing up occurs when your body forces food that is in your stomach out through your mouth. Nausea and vomiting are symptoms that are caused by many things. They can happen when a condition or disease, medicine, medical treatment, or a poisonous substance affects the area in your brain that controls vomiting. Some conditions or diseases can cause nausea, abdominal pain or cramps, and vomiting. The symptoms can be mild and go away by themselves. Other symptoms can be serious. You will need to see your healthcare provider for these.  Nausea and vomiting are common. They can be caused by many things. These include:  · "Stomach flu" (gastroenteritis)  · Food poisoning  · Stomach pain (gastritis)  · Blockages  They can also be caused by a head injury, an infection in the brain or inside the ear, or migraines. Other common causes of nausea and vomiting include:  · Brain tumor  · Brain bruise  · Motion sickness  · Drugs. These include alcohol, pain medicines such as morphine, and cancer medicines.  · Toxins. These are poisonous things like plants or liquids that are swallowed by accident.  · Advanced types of cancer  · Movement problems (psychogenic problems)  · Extra pressure in the fluid that surrounds the brain and spinal cord (elevated intracranial pressure)     Nausea and vomiting are also common side effects of chemotherapy and radiation therapy. Side effects happen when treatment changes some normal cells as well as cancer cells. In this " case, the cells lining your stomach and the part of your brain that controls vomiting are affected. Other more serious causes of vomiting may be hard to find early in the illness.     When to seek medical advice  Call your healthcare provider right away if you have the following:  · Nausea or vomiting that lasts 24 hours or more  · Trouble keeping fluids down   Medicines can help  Nausea or vomiting can often be prevented or controlled with medicines (antiemetics). Your doctor may give you antiemetics before or after treatment if you are getting chemotherapy or other medical treatments that cause nausea or vomiting.  Eating tips  · If you have medicines to control nausea, take them before meals as directed.  · Avoid fatty or greasy foods while nauseated.  · Eat small meals slowly throughout the day.  · Ask someone to sit with you while you eat to keep you from thinking about feeling nauseated.  · Eat foods at room temperature or colder to avoid strong smells.  · Eat dry foods, such as toast, crackers, or pretzels. Also eat cool, light foods, such as applesauce, and bland foods, such as oatmeal or skinned chicken.   Other ways to feel better  · Get a little fresh air. Take a short walk.  · Talk to a friend, listen to music, or watch TV.  · Take a few deep, slow breaths.  · Eat by candlelight or in surroundings that you find relaxing.  · Use a technique, such as guided imagery, to help you relax. Imagine yourself in a beautiful, restful scene. Or daydream about the place youd most like to be.  Date Last Reviewed: 1/6/2016 © 2000-2016 [employer]. [address] [place] [address], [place] [address]. All rights reserved. This information is not intended as a substitute for professional medical care. Always follow your healthcare professional's instructions.        What is Irritable Bowel Syndrome (IBS)?     Muscle contraction moves food through the digestive tract.      People who have irritable bowel syndrome  "(IBS) have digestive tracts that react abnormally to certain substances or to stress. This leads to symptoms like cramps, gas, bloating, pain, constipation, and diarrhea. Sometimes called spastic colon, IBS is a common condition that is not a disease, but rather a group of symptoms that happen together.  IBS--a motility problem  The muscle movement that passes food through the digestive tract is called motility. When you have IBS, the normal motility of the digestive tract (especially the colon) is disrupted. Motility may speed up, slow down, or become irregular. If stool passes too quickly through the colon, not enough water is absorbed from it. Loose, watery stools (diarrhea) can result. If stool passes through the colon too slowly, too much water is absorbed and the stool becomes hard and dry (constipation). Also, stool and gas may back up and cause painful pressure and cramping. There is no single test that can diagnose IBS. It is a group of symptoms that help your healthcare provider with the diagnosis. Often multiple blood, stool, radiologic tests, or even colonoscopy are performed in the evaluation of people suspected to have IBS. These are done primarily to make sure that there are no other illnesses that can account for your symptoms.   What causes IBS?  A great deal of research has been done on IBS, but the cause is still not known. Some of the possible factors include:   · Smoking, eating certain foods, or drinking alcohol or caffeinated drinks can cause, or "trigger," symptoms of IBS.  · Although no one knows for sure, IBS may be caused by a problem with the nerves or muscles in your digestive tract.  · There is also some evidence that certain bacteria found after a severe gastroinstestinal infection in the small intestine and colon may cause IBS.  · While stress and anxiety worsen the symptoms of IBS, it is not believed to be the cause.   What you can do  Recommendations include:  · Certain medicines " may help regulate the working of your digestive tract. Your healthcare provider may prescribe one or more for you.  · Medicine cant cure IBS, but it may help manage the symptoms.  · Because some medicines may make IBS worse, dont take any medicine, especially laxatives, unless your healthcare provider prescribes it for you.  · Your healthcare provider may suggest some lifestyle changes to help control your IBS. Two of the most important are changing your diet and managing stress. If diet changes are suggested, ask for nutritional guidance from a dietitian so you maintain a healthy nutritional balance in your food intake.   Date Last Reviewed: 7/1/2016  © 0403-4422 [employer]. [address] [place] [address], [place] [address]. All rights reserved. This information is not intended as a substitute for professional medical care. Always follow your healthcare professional's instructions.             Language Assistance Services     ATTENTION: Language assistance services are available, free of charge. Please call [phone].      ATENCIÓN: Si habla español, tiene a san disposición servicios gratuitos de asistencia lingüística. Llame al [phone].     FRACISCO Ý: N?u b?n nói Ti?ng Vi?t, có các d?ch v? h? tr? ngôn ng? mi?n phí dành cho b?n. G?i s? [phone].         [facility] complies with applicable Federal civil rights laws and does not discriminate on the basis of race, color, national origin, age, disability, or sex.

## 2017-04-25 NOTE — PATIENT INSTRUCTIONS
Excess Gas  Certain foods produce gas when digested. In some people, these foods make an excessive amount of gas. This may cause bloating, burping, or increased gas passing through the rectum (flatulence).    Foods that cause gas  The following foods are more likely to cause this problem. Limit them, or remove them from your diet:  · Broccoli  · Cauliflower  · [place] sprouts  · Cabbage  · Cooked dried beans  · Fizzy (carbonated) drinks, such as sparkling water, soda, beer, and champagne  Other causes  Other causes of excess gas include:  · Eating too fast or talking while you chew. This may cause you to swallow air. This increases the amount of gas in your stomach. And it may make your symptoms worse. Chew each mouthful completely before you swallow. Take your time.  · Chewing on gum or sucking on hard candy. These cause you to swallow more often. And some of what you are swallowing is air. This leads to more gas in your stomach. Avoid chewing gum and hard candy.  · Overeating. This may increase the feeling of being bloated and cause more gas. When you are full, stop eating.   · Being constipated. This can increase the amount of normal intestinal gas. Avoid constipation by getting more fiber in your diet. Good sources of fiber include whole-grain cereal, fresh vegetables (except those in the above list), and fresh fruits. High-fiber foods absorb water and carry it out of the body. When adding more fiber to your diet, you also need to drink more water. You should drink at least 8, 8-ounce glasses of water (2 quarts) per day.  Date Last Reviewed: 8/1/2016  © 8100-5874 [employer]. [address] [place] [address]. All rights reserved. This information is not intended as a substitute for professional medical care. Always follow your healthcare professional's instructions.        How to Control Nausea and Vomiting     Taken before meals, medicines can help ease nausea.    Nausea is feeling  "that you need to throw up. Throwing up occurs when your body forces food that is in your stomach out through your mouth. Nausea and vomiting are symptoms that are caused by many things. They can happen when a condition or disease, medicine, medical treatment, or a poisonous substance affects the area in your brain that controls vomiting. Some conditions or diseases can cause nausea, abdominal pain or cramps, and vomiting. The symptoms can be mild and go away by themselves. Other symptoms can be serious. You will need to see your healthcare provider for these.  Nausea and vomiting are common. They can be caused by many things. These include:  · "Stomach flu" (gastroenteritis)  · Food poisoning  · Stomach pain (gastritis)  · Blockages  They can also be caused by a head injury, an infection in the brain or inside the ear, or migraines. Other common causes of nausea and vomiting include:  · Brain tumor  · Brain bruise  · Motion sickness  · Drugs. These include alcohol, pain medicines such as morphine, and cancer medicines.  · Toxins. These are poisonous things like plants or liquids that are swallowed by accident.  · Advanced types of cancer  · Movement problems (psychogenic problems)  · Extra pressure in the fluid that surrounds the brain and spinal cord (elevated intracranial pressure)     Nausea and vomiting are also common side effects of chemotherapy and radiation therapy. Side effects happen when treatment changes some normal cells as well as cancer cells. In this case, the cells lining your stomach and the part of your brain that controls vomiting are affected. Other more serious causes of vomiting may be hard to find early in the illness.     When to seek medical advice  Call your healthcare provider right away if you have the following:  · Nausea or vomiting that lasts 24 hours or more  · Trouble keeping fluids down   Medicines can help  Nausea or vomiting can often be prevented or controlled with medicines " (antiemetics). Your doctor may give you antiemetics before or after treatment if you are getting chemotherapy or other medical treatments that cause nausea or vomiting.  Eating tips  · If you have medicines to control nausea, take them before meals as directed.  · Avoid fatty or greasy foods while nauseated.  · Eat small meals slowly throughout the day.  · Ask someone to sit with you while you eat to keep you from thinking about feeling nauseated.  · Eat foods at room temperature or colder to avoid strong smells.  · Eat dry foods, such as toast, crackers, or pretzels. Also eat cool, light foods, such as applesauce, and bland foods, such as oatmeal or skinned chicken.   Other ways to feel better  · Get a little fresh air. Take a short walk.  · Talk to a friend, listen to music, or watch TV.  · Take a few deep, slow breaths.  · Eat by candlelight or in surroundings that you find relaxing.  · Use a technique, such as guided imagery, to help you relax. Imagine yourself in a beautiful, restful scene. Or daydream about the place youd most like to be.  Date Last Reviewed: 1/6/2016  © 7602-0452 [employer]. [address] [place] [address], [place] [address]. All rights reserved. This information is not intended as a substitute for professional medical care. Always follow your healthcare professional's instructions.        What is Irritable Bowel Syndrome (IBS)?     Muscle contraction moves food through the digestive tract.      People who have irritable bowel syndrome (IBS) have digestive tracts that react abnormally to certain substances or to stress. This leads to symptoms like cramps, gas, bloating, pain, constipation, and diarrhea. Sometimes called spastic colon, IBS is a common condition that is not a disease, but rather a group of symptoms that happen together.  IBS--a motility problem  The muscle movement that passes food through the digestive tract is called motility. When you have IBS, the normal motility  "of the digestive tract (especially the colon) is disrupted. Motility may speed up, slow down, or become irregular. If stool passes too quickly through the colon, not enough water is absorbed from it. Loose, watery stools (diarrhea) can result. If stool passes through the colon too slowly, too much water is absorbed and the stool becomes hard and dry (constipation). Also, stool and gas may back up and cause painful pressure and cramping. There is no single test that can diagnose IBS. It is a group of symptoms that help your healthcare provider with the diagnosis. Often multiple blood, stool, radiologic tests, or even colonoscopy are performed in the evaluation of people suspected to have IBS. These are done primarily to make sure that there are no other illnesses that can account for your symptoms.   What causes IBS?  A great deal of research has been done on IBS, but the cause is still not known. Some of the possible factors include:   · Smoking, eating certain foods, or drinking alcohol or caffeinated drinks can cause, or "trigger," symptoms of IBS.  · Although no one knows for sure, IBS may be caused by a problem with the nerves or muscles in your digestive tract.  · There is also some evidence that certain bacteria found after a severe gastroinstestinal infection in the small intestine and colon may cause IBS.  · While stress and anxiety worsen the symptoms of IBS, it is not believed to be the cause.   What you can do  Recommendations include:  · Certain medicines may help regulate the working of your digestive tract. Your healthcare provider may prescribe one or more for you.  · Medicine cant cure IBS, but it may help manage the symptoms.  · Because some medicines may make IBS worse, dont take any medicine, especially laxatives, unless your healthcare provider prescribes it for you.  · Your healthcare provider may suggest some lifestyle changes to help control your IBS. Two of the most important are changing " your diet and managing stress. If diet changes are suggested, ask for nutritional guidance from a dietitian so you maintain a healthy nutritional balance in your food intake.   Date Last Reviewed: 7/1/2016  © 1257-3793 [employer] [address] [place] [address], [place] [address]. All rights reserved. This information is not intended as a substitute for professional medical care. Always follow your healthcare professional's instructions.

## 2017-04-27 ENCOUNTER — LAB VISIT (OUTPATIENT)
Dept: LAB | Facility: HOSPITAL | Age: 42
End: 2017-04-27
Attending: NURSE PRACTITIONER
Payer: COMMERCIAL

## 2017-04-27 DIAGNOSIS — R10.2 SUPRAPUBIC PAIN: ICD-10-CM

## 2017-04-27 DIAGNOSIS — R11.2 NON-INTRACTABLE VOMITING WITH NAUSEA, UNSPECIFIED VOMITING TYPE: ICD-10-CM

## 2017-04-27 LAB
ALBUMIN SERPL BCP-MCNC: 4.1 G/DL
ALP SERPL-CCNC: 61 U/L
ALT SERPL W/O P-5'-P-CCNC: 15 U/L
ANION GAP SERPL CALC-SCNC: 11 MMOL/L
AST SERPL-CCNC: 19 U/L
BASOPHILS # BLD AUTO: 0.1 K/UL
BASOPHILS NFR BLD: 1.3 %
BILIRUB SERPL-MCNC: 0.6 MG/DL
BILIRUB UR QL STRIP: NEGATIVE
BUN SERPL-MCNC: 13 MG/DL
CALCIUM SERPL-MCNC: 9 MG/DL
CHLORIDE SERPL-SCNC: 102 MMOL/L
CLARITY UR: CLEAR
CO2 SERPL-SCNC: 25 MMOL/L
COLOR UR: YELLOW
CREAT SERPL-MCNC: 1.2 MG/DL
DIFFERENTIAL METHOD: ABNORMAL
EOSINOPHIL # BLD AUTO: 0.1 K/UL
EOSINOPHIL NFR BLD: 1.3 %
ERYTHROCYTE [DISTWIDTH] IN BLOOD BY AUTOMATED COUNT: 13.6 %
EST. GFR  (AFRICAN AMERICAN): >60 ML/MIN/1.73 M^2
EST. GFR  (NON AFRICAN AMERICAN): >60 ML/MIN/1.73 M^2
GLUCOSE SERPL-MCNC: 80 MG/DL
GLUCOSE UR QL STRIP: NEGATIVE
HCT VFR BLD AUTO: 36.7 %
HGB BLD-MCNC: 12.8 G/DL
HGB UR QL STRIP: NEGATIVE
KETONES UR QL STRIP: NEGATIVE
LEUKOCYTE ESTERASE UR QL STRIP: NEGATIVE
LYMPHOCYTES # BLD AUTO: 2.4 K/UL
LYMPHOCYTES NFR BLD: 32.4 %
MCH RBC QN AUTO: 30.9 PG
MCHC RBC AUTO-ENTMCNC: 34.8 %
MCV RBC AUTO: 89 FL
MONOCYTES # BLD AUTO: 0.3 K/UL
MONOCYTES NFR BLD: 4.5 %
NEUTROPHILS # BLD AUTO: 4.6 K/UL
NEUTROPHILS NFR BLD: 60.5 %
NITRITE UR QL STRIP: NEGATIVE
PH UR STRIP: 6 [PH] (ref 5–8)
PLATELET # BLD AUTO: 212 K/UL
PMV BLD AUTO: 6.7 FL
POTASSIUM SERPL-SCNC: 3.8 MMOL/L
PROT SERPL-MCNC: 7.1 G/DL
PROT UR QL STRIP: NEGATIVE
RBC # BLD AUTO: 4.14 M/UL
SODIUM SERPL-SCNC: 138 MMOL/L
SP GR UR STRIP: <=1.005 (ref 1–1.03)
URN SPEC COLLECT METH UR: ABNORMAL
UROBILINOGEN UR STRIP-ACNC: NEGATIVE EU/DL
WBC # BLD AUTO: 7.6 K/UL

## 2017-04-27 PROCEDURE — 85025 COMPLETE CBC W/AUTO DIFF WBC: CPT

## 2017-04-27 PROCEDURE — 36415 COLL VENOUS BLD VENIPUNCTURE: CPT

## 2017-04-27 PROCEDURE — 80053 COMPREHEN METABOLIC PANEL: CPT

## 2017-04-27 PROCEDURE — 81003 URINALYSIS AUTO W/O SCOPE: CPT

## 2017-04-28 ENCOUNTER — TELEPHONE (OUTPATIENT)
Dept: GASTROENTEROLOGY | Facility: CLINIC | Age: 42
End: 2017-04-28

## 2017-04-28 NOTE — TELEPHONE ENCOUNTER
Please call to inform & review the results with the patient- blood work showed normal electrolytes, kidney and liver function levels, mild anemia- slightly lower than previous blood work 9 months ago (recommend to follow-up with PCP for continued evaluation and management) and urinalysis showed it was diluted but otherwise normal findings.  Continue with previous recommendations.  Thanks,  Jaycee FREEMAN FNP-C

## 2017-05-01 ENCOUNTER — HOSPITAL ENCOUNTER (OUTPATIENT)
Dept: RADIOLOGY | Facility: HOSPITAL | Age: 42
Discharge: HOME | End: 2017-05-01
Attending: NURSE PRACTITIONER
Payer: COMMERCIAL

## 2017-05-01 DIAGNOSIS — R10.2 SUPRAPUBIC PAIN: ICD-10-CM

## 2017-05-01 DIAGNOSIS — R11.2 NON-INTRACTABLE VOMITING WITH NAUSEA, UNSPECIFIED VOMITING TYPE: ICD-10-CM

## 2017-05-01 DIAGNOSIS — R14.0 ABDOMINAL BLOATING: ICD-10-CM

## 2017-05-01 DIAGNOSIS — K58.1 IRRITABLE BOWEL SYNDROME WITH CONSTIPATION: ICD-10-CM

## 2017-05-01 PROCEDURE — 74177 CT ABD & PELVIS W/CONTRAST: CPT | Mod: 26,,, | Performed by: RADIOLOGY

## 2017-05-01 PROCEDURE — 25500020 PHARM REV CODE 255

## 2017-05-01 PROCEDURE — 74177 CT ABD & PELVIS W/CONTRAST: CPT | Mod: TC

## 2017-05-01 RX ADMIN — IOHEXOL 30 ML: 350 INJECTION, SOLUTION INTRAVENOUS at 10:05

## 2017-05-01 RX ADMIN — IOHEXOL 75 ML: 350 INJECTION, SOLUTION INTRAVENOUS at 10:05

## 2017-05-02 NOTE — TELEPHONE ENCOUNTER
Pt notified of results. Pt verbalized understanding and will contact his primary in regards to his slight anemia.

## 2017-05-10 ENCOUNTER — DOCUMENTATION ONLY (OUTPATIENT)
Dept: FAMILY MEDICINE | Facility: CLINIC | Age: 42
End: 2017-05-10

## 2017-05-10 NOTE — PROGRESS NOTES
Pre-Visit Chart Review  For Appointment Scheduled on 5-12-17    Health Maintenance Due   Topic Date Due    TETANUS VACCINE  06/23/1995

## 2017-05-12 ENCOUNTER — OFFICE VISIT (OUTPATIENT)
Dept: FAMILY MEDICINE | Facility: CLINIC | Age: 42
End: 2017-05-12
Payer: COMMERCIAL

## 2017-05-12 VITALS
TEMPERATURE: 98 F | RESPIRATION RATE: 16 BRPM | DIASTOLIC BLOOD PRESSURE: 62 MMHG | WEIGHT: 176.81 LBS | BODY MASS INDEX: 28.42 KG/M2 | SYSTOLIC BLOOD PRESSURE: 111 MMHG | HEIGHT: 66 IN | HEART RATE: 88 BPM | OXYGEN SATURATION: 97 %

## 2017-05-12 DIAGNOSIS — R00.2 PALPITATIONS: ICD-10-CM

## 2017-05-12 DIAGNOSIS — M79.602 LEFT ARM PAIN: ICD-10-CM

## 2017-05-12 DIAGNOSIS — R07.9 CHEST PAIN, UNSPECIFIED TYPE: Primary | ICD-10-CM

## 2017-05-12 PROCEDURE — 93010 ELECTROCARDIOGRAM REPORT: CPT | Mod: S$GLB,,, | Performed by: INTERNAL MEDICINE

## 2017-05-12 PROCEDURE — 93005 ELECTROCARDIOGRAM TRACING: CPT | Mod: S$GLB,,, | Performed by: FAMILY MEDICINE

## 2017-05-12 PROCEDURE — 99999 PR PBB SHADOW E&M-EST. PATIENT-LVL III: CPT | Mod: PBBFAC,,, | Performed by: FAMILY MEDICINE

## 2017-05-12 PROCEDURE — 1160F RVW MEDS BY RX/DR IN RCRD: CPT | Mod: S$GLB,,, | Performed by: FAMILY MEDICINE

## 2017-05-12 PROCEDURE — 99213 OFFICE O/P EST LOW 20 MIN: CPT | Mod: S$GLB,,, | Performed by: FAMILY MEDICINE

## 2017-05-12 NOTE — MR AVS SNAPSHOT
[facility]  [address]  [facility] [address]  Phone: [phone]  Fax: [phone]                  Rios Buck   2017 3:20 PM   Office Visit    Description:  Male : 1975   Provider:  Faheem Barraza MD   Department:  [facility] Medicine           Reason for Visit     Chest Pain     Arm Pain           Diagnoses this Visit        Comments    Chest pain, unspecified type    -  Primary     Left arm pain         Palpitations                To Do List           Future Appointments        Provider Department Dept Phone    2017 1:00 PM TOBY, VIJAY [facility] - Cardiology [phone]      Your Future Surgeries/Procedures     May 23, 2017   Surgery with Miguel Valentine MD   [facility] ([facility]) [address]  [facility] [id number] [phone]              Goals (5 Years of Data)     None      [facility]     [facility] Nurse Care Line -  Assistance  Unless otherwise directed by your provider, please contact [facility], our nurse care line that is available for  assistance.     Registered nurses in the [facility] provide: appointment scheduling, clinical advisement, health education, and other advisory services.  Call: [phone] (toll free)               Medications           Message regarding Medications     Verify the changes and/or additions to your medication regime listed below are the same as discussed with your clinician today.  If any of these changes or additions are incorrect, please notify your healthcare provider.        STOP taking these medications     docusate sodium (COLACE) 50 MG capsule Take by mouth 2 (two) times daily as needed for Constipation.           Verify that the below list of medications is an accurate representation of the medications you are currently taking.  If none reported, the list may be blank. If incorrect, please contact your  "healthcare provider. Carry this list with you in case of emergency.           Current Medications     acetaminophen (TYLENOL) 325 MG tablet Take 325 mg by mouth every 6 (six) hours as needed for Pain.    CYANOCOBALAMIN, VITAMIN B-12, (VITAMIN B-12 ORAL) Take 1 each by mouth once daily.    ibuprofen (ADVIL,MOTRIN) 600 MG tablet Take 1 tablet (600 mg total) by mouth every 6 (six) hours as needed for Pain.    lubiprostone (AMITIZA) 8 MCG Cap Take 1 capsule (8 mcg total) by mouth 2 (two) times daily with meals.    pantoprazole (PROTONIX) 40 MG tablet Take 1 tablet (40 mg total) by mouth once daily.           Clinical Reference Information           Your Vitals Were     BP Pulse Temp Resp Height Weight    111/62 (BP Location: Right arm, Patient Position: Sitting, BP Method: Automatic) 88 98 °F (36.7 °C) (Oral) 16 5' 6" (1.676 m) 80.2 kg (176 lb 12.9 oz)    SpO2 BMI             97% 28.54 kg/m2         Blood Pressure          Most Recent Value    BP  111/62      Allergies as of 5/12/2017     No Known Allergies      Immunizations Administered on Date of Encounter - 5/12/2017     None      Orders Placed During Today's Visit      Normal Orders This Visit    IN OFFICE EKG 12-LEAD (to [place])     Future Labs/Procedures Expected by Expires    Exercise stress echo  As directed 5/12/2018      Language Assistance Services     ATTENTION: Language assistance services are available, free of charge. Please call [phone].      ATENCIÓN: Si habla fernyañol, tiene a san disposición servicios gratuitos de asistencia lingüística. Llame al [phone].     CHÚ Ý: N?u b?n nói Ti?ng Vi?t, có các d?ch v? h? tr? ngôn ng? mi?n phí dành cho b?n. G?i s? [phone].         [facility] complies with applicable Federal civil rights laws and does not discriminate on the basis of race, color, national origin, age, disability, or sex.        "

## 2017-05-12 NOTE — PROGRESS NOTES
Subjective:       Patient ID: Rios Buck is a 41 y.o. male.    Chief Complaint: Chest Pain and Arm Pain    HPI Comments: 41 year old male, patient of Dr. Olivo, comes in with history of waking from a dream two days ago with heart palpitations and chest heaviness associated with left arm pain.  Chest pain was not pleuritic and not associated with nausea or diaphoresis.  He had never had anything like it before.  It did radiate to the supscapular area/mid back.  It lasted about an hour overall, then subsided and has not recurred.  He has a history of elevated cholesterol and has been on crestor in the past.  He does not smoke but often uses up to three Red Bull a day.  His mother had bypass surgery in her forties.    Past Medical History:  No date: Colon polyp  No date: Diverticulosis  No date: Epidermoid cyst of testis      Comment: left  No date: H. pylori infection  No date: Hydrocele, left  No date: IBS (irritable bowel syndrome)  No date: Peptic ulcer disease    Past Surgical History:  No date: ADENOIDECTOMY  06/2015: COLONOSCOPY      Comment: repeat in 5 years for surveillance  2012: KNEE ARTHROSCOPY W/ DEBRIDEMENT      Comment: right knee   No date: TONSILLECTOMY  05/2015: UPPER GASTROINTESTINAL ENDOSCOPY      Comment: repeat in 8 weeks to reassess ulcer for                healing      Current Outpatient Prescriptions:     acetaminophen (TYLENOL) 325 MG tablet, Take 325 mg by mouth every 6 (six) hours as needed for Pain., Disp: , Rfl:     CYANOCOBALAMIN, VITAMIN B-12, (VITAMIN B-12 ORAL), Take 1 each by mouth once daily., Disp: , Rfl:     ibuprofen (ADVIL,MOTRIN) 600 MG tablet, Take 1 tablet (600 mg total) by mouth every 6 (six) hours as needed for Pain., Disp: 20 tablet, Rfl: 0    lubiprostone (AMITIZA) 8 MCG Cap, Take 1 capsule (8 mcg total) by mouth 2 (two) times daily with meals., Disp: 60 capsule, Rfl: 2    pantoprazole (PROTONIX) 40 MG tablet, Take 1 tablet (40 mg total) by mouth once  daily., Disp: 30 tablet, Rfl: 3        Chest Pain    Associated symptoms include palpitations. Pertinent negatives include no abdominal pain, diaphoresis, fever, nausea, numbness, shortness of breath, vomiting or weakness.   Arm Pain    Associated symptoms include chest pain. Pertinent negatives include no numbness.     Review of Systems   Constitutional: Negative for chills, diaphoresis, fatigue and fever.   Respiratory: Negative for chest tightness, shortness of breath and wheezing.    Cardiovascular: Positive for chest pain and palpitations.   Gastrointestinal: Negative for abdominal pain, anal bleeding, blood in stool, constipation, diarrhea, nausea and vomiting.   Neurological: Negative for weakness and numbness.       Objective:      Physical Exam   Constitutional: He appears well-developed. No distress.   Good blood pressure control  Patient is overweight with bmi of 28.5.      Cardiovascular: Normal rate, regular rhythm and normal heart sounds.  Exam reveals no gallop and no friction rub.    No murmur heard.  Pulmonary/Chest: Effort normal. No accessory muscle usage. No apnea, no tachypnea and no bradypnea. No respiratory distress. He has no decreased breath sounds. He has no wheezes. He has no rhonchi. He has no rales. Chest wall is not dull to percussion. He exhibits no tenderness, no bony tenderness, no crepitus and no retraction.   Abdominal: Soft. Bowel sounds are normal. He exhibits no distension and no mass. There is no tenderness. There is no rebound and no guarding. No hernia.   Skin: He is not diaphoretic.   Nursing note and vitals reviewed.      Assessment:       1. Chest pain, unspecified type    2. Left arm pain    3. Palpitations        Plan:       1. Chest pain, unspecified type  Concern for family history and suggestive nature of pain.  Discontinue the Red Bull drinks, ekg is normal, will schedule treadmill stress test  - IN OFFICE EKG 12-LEAD (to [place])  - Exercise stress echo; Future    2.  Left arm pain  - IN OFFICE EKG 12-LEAD (to [place])  - Exercise stress echo; Future    3. Palpitations  - IN OFFICE EKG 12-LEAD (to [place])  - Exercise stress echo; Future

## 2017-05-22 ENCOUNTER — CLINICAL SUPPORT (OUTPATIENT)
Dept: CARDIOLOGY | Facility: CLINIC | Age: 42
End: 2017-05-22
Payer: COMMERCIAL

## 2017-05-22 DIAGNOSIS — M79.602 LEFT ARM PAIN: ICD-10-CM

## 2017-05-22 DIAGNOSIS — R07.9 CHEST PAIN, UNSPECIFIED TYPE: ICD-10-CM

## 2017-05-22 DIAGNOSIS — R00.2 PALPITATIONS: ICD-10-CM

## 2017-05-22 LAB
DIASTOLIC DYSFUNCTION: NO
RETIRED EF AND QEF - SEE NOTES: 63 (ref 55–65)

## 2017-05-22 PROCEDURE — 93321 DOPPLER ECHO F-UP/LMTD STD: CPT | Mod: S$GLB,,, | Performed by: INTERNAL MEDICINE

## 2017-05-22 PROCEDURE — 93351 STRESS TTE COMPLETE: CPT | Mod: S$GLB,,, | Performed by: INTERNAL MEDICINE

## 2017-05-23 ENCOUNTER — ANESTHESIA EVENT (OUTPATIENT)
Dept: ENDOSCOPY | Facility: HOSPITAL | Age: 42
End: 2017-05-23
Payer: COMMERCIAL

## 2017-05-23 ENCOUNTER — SURGERY (OUTPATIENT)
Age: 42
End: 2017-05-23

## 2017-05-23 ENCOUNTER — ANESTHESIA (OUTPATIENT)
Dept: ENDOSCOPY | Facility: HOSPITAL | Age: 42
End: 2017-05-23
Payer: COMMERCIAL

## 2017-05-23 ENCOUNTER — HOSPITAL ENCOUNTER (OUTPATIENT)
Facility: HOSPITAL | Age: 42
Discharge: HOME | End: 2017-05-23
Attending: INTERNAL MEDICINE | Admitting: INTERNAL MEDICINE
Payer: COMMERCIAL

## 2017-05-23 VITALS — RESPIRATION RATE: 16 BRPM

## 2017-05-23 DIAGNOSIS — R14.0 ABDOMINAL BLOATING: ICD-10-CM

## 2017-05-23 DIAGNOSIS — K29.70 GASTRITIS, PRESENCE OF BLEEDING UNSPECIFIED, UNSPECIFIED CHRONICITY, UNSPECIFIED GASTRITIS TYPE: Primary | ICD-10-CM

## 2017-05-23 PROCEDURE — 25000003 PHARM REV CODE 250: Performed by: NURSE ANESTHETIST, CERTIFIED REGISTERED

## 2017-05-23 PROCEDURE — 27201012 HC FORCEPS, HOT/COLD, DISP: Performed by: INTERNAL MEDICINE

## 2017-05-23 PROCEDURE — 88305 TISSUE EXAM BY PATHOLOGIST: CPT | Mod: 26,,, | Performed by: PATHOLOGY

## 2017-05-23 PROCEDURE — D9220A PRA ANESTHESIA: Mod: ANES,,, | Performed by: ANESTHESIOLOGY

## 2017-05-23 PROCEDURE — 27201038 HC PROBE, BI-POLAR: Performed by: INTERNAL MEDICINE

## 2017-05-23 PROCEDURE — 43239 EGD BIOPSY SINGLE/MULTIPLE: CPT | Mod: ,,, | Performed by: INTERNAL MEDICINE

## 2017-05-23 PROCEDURE — D9220A PRA ANESTHESIA: Mod: CRNA,,, | Performed by: NURSE ANESTHETIST, CERTIFIED REGISTERED

## 2017-05-23 PROCEDURE — 37000009 HC ANESTHESIA EA ADD 15 MINS: Performed by: INTERNAL MEDICINE

## 2017-05-23 PROCEDURE — 37000008 HC ANESTHESIA 1ST 15 MINUTES: Performed by: INTERNAL MEDICINE

## 2017-05-23 PROCEDURE — 88305 TISSUE EXAM BY PATHOLOGIST: CPT | Performed by: PATHOLOGY

## 2017-05-23 PROCEDURE — 25000003 PHARM REV CODE 250: Performed by: INTERNAL MEDICINE

## 2017-05-23 PROCEDURE — 43239 EGD BIOPSY SINGLE/MULTIPLE: CPT | Performed by: INTERNAL MEDICINE

## 2017-05-23 PROCEDURE — 63600175 PHARM REV CODE 636 W HCPCS: Performed by: NURSE ANESTHETIST, CERTIFIED REGISTERED

## 2017-05-23 RX ORDER — PROPOFOL 10 MG/ML
VIAL (ML) INTRAVENOUS
Status: DISCONTINUED | OUTPATIENT
Start: 2017-05-23 | End: 2017-05-23

## 2017-05-23 RX ORDER — PROPOFOL 10 MG/ML
INJECTION, EMULSION INTRAVENOUS
Status: DISCONTINUED
Start: 2017-05-23 | End: 2017-05-23 | Stop reason: HOSPADM

## 2017-05-23 RX ORDER — SODIUM CHLORIDE 9 MG/ML
INJECTION, SOLUTION INTRAVENOUS CONTINUOUS
Status: DISCONTINUED | OUTPATIENT
Start: 2017-05-23 | End: 2017-05-23 | Stop reason: HOSPADM

## 2017-05-23 RX ORDER — LIDOCAINE HYDROCHLORIDE 20 MG/ML
INJECTION, SOLUTION EPIDURAL; INFILTRATION; INTRACAUDAL; PERINEURAL
Status: DISCONTINUED
Start: 2017-05-23 | End: 2017-05-23 | Stop reason: HOSPADM

## 2017-05-23 RX ORDER — LIDOCAINE HCL/PF 100 MG/5ML
SYRINGE (ML) INTRAVENOUS
Status: DISCONTINUED | OUTPATIENT
Start: 2017-05-23 | End: 2017-05-23

## 2017-05-23 RX ADMIN — PROPOFOL 50 MG: 10 INJECTION, EMULSION INTRAVENOUS at 07:05

## 2017-05-23 RX ADMIN — SODIUM CHLORIDE 1000 ML: 0.9 INJECTION, SOLUTION INTRAVENOUS at 07:05

## 2017-05-23 RX ADMIN — LIDOCAINE HYDROCHLORIDE 100 MG: 20 INJECTION, SOLUTION INTRAVENOUS at 07:05

## 2017-05-23 RX ADMIN — PROPOFOL 50 MG: 10 INJECTION, EMULSION INTRAVENOUS at 08:05

## 2017-05-23 RX ADMIN — PROPOFOL 100 MG: 10 INJECTION, EMULSION INTRAVENOUS at 07:05

## 2017-05-23 NOTE — TRANSFER OF CARE
"Anesthesia Transfer of Care Note    Patient: Rios Buck    Procedure(s) Performed: Procedure(s) (LRB):  ESOPHAGOGASTRODUODENOSCOPY (EGD) (N/A)    Patient location: GI    Anesthesia Type: general    Transport from OR: Transported from OR on room air with adequate spontaneous ventilation    Post pain: adequate analgesia    Post assessment: no apparent anesthetic complications    Post vital signs: stable    Level of consciousness: awake, alert and oriented    Nausea/Vomiting: no nausea/vomiting    Complications: none    Transfer of care protocol was followed      Last vitals:   Visit Vitals  /72   Pulse 79   Temp 36.3 °C (97.3 °F)   Resp 15   Ht 5' 7" (1.702 m)   Wt 72.6 kg (160 lb)   SpO2 98%   BMI 25.06 kg/m²     "

## 2017-05-23 NOTE — DISCHARGE INSTRUCTIONS
"Discharge Instructions: After Your Surgery/Procedure  Youve just had surgery. During surgery you were given medicine called anesthesia to keep you relaxed and free of pain. After surgery you may have some pain or nausea. This is common. Here are some tips for feeling better and getting well after surgery.     Stay on schedule with your medication.   Going home  Your doctor or nurse will show you how to take care of yourself when you go home. He or she will also answer your questions. Have an adult family member or friend drive you home.      For your safety we recommend these precaution for the first 24 hours after your procedure:  · Do not drive or use heavy equipment.  · Do not make important decisions or sign legal papers.  · Do not drink alcohol.  · Have someone stay with you, if needed. He or she can watch for problems and help keep you safe.  · Your concentration, balance, coordination, and judgement may be impaired for many hours after anesthesia.  Use caution when ambulating or standing up.     · You may feel weak and "washed out" after anesthesia and surgery.      Subtle residual effects of general anesthesia or sedation with regional / local anesthesia can last more than 24 hours.  Rest for the remainder of the day or longer if your Doctor/Surgeon has advised you to do so.  Although you may feel normal within the first 24 hours, your reflexes and mental ability may be impaired without you realizing it.  You may feel dizzy, lightheaded or sleepy for 24 hours or longer.      Be sure to go to all follow-up visits with your doctor. And rest after your surgery for as long as your doctor tells you to.  Coping with pain  If you have pain after surgery, pain medicine will help you feel better. Take it as told, before pain becomes severe. Also, ask your doctor or pharmacist about other ways to control pain. This might be with heat, ice, or relaxation. And follow any other instructions your surgeon or nurse gives " you.  Tips for taking pain medicine  To get the best relief possible, remember these points:  · Pain medicines can upset your stomach. Taking them with a little food may help.  · Most pain relievers taken by mouth need at least 20 to 30 minutes to start to work.  · Taking medicine on a schedule can help you remember to take it. Try to time your medicine so that you can take it before starting an activity. This might be before you get dressed, go for a walk, or sit down for dinner.  · Constipation is a common side effect of pain medicines. Call your doctor before taking any medicines such as laxatives or stool softeners to help ease constipation. Also ask if you should skip any foods. Drinking lots of fluids and eating foods such as fruits and vegetables that are high in fiber can also help. Remember, do not take laxatives unless your surgeon has prescribed them.  · Drinking alcohol and taking pain medicine can cause dizziness and slow your breathing. It can even be deadly. Do not drink alcohol while taking pain medicine.  · Pain medicine can make you react more slowly to things. Do not drive or run machinery while taking pain medicine.  Your health care provider may tell you to take acetaminophen to help ease your pain. Ask him or her how much you are supposed to take each day. Acetaminophen or other pain relievers may interact with your prescription medicines or other over-the-counter (OTC) drugs. Some prescription medicines have acetaminophen and other ingredients. Using both prescription and OTC acetaminophen for pain can cause you to overdose. Read the labels on your OTC medicines with care. This will help you to clearly know the list of ingredients, how much to take, and any warnings. It may also help you not take too much acetaminophen. If you have questions or do not understand the information, ask your pharmacist or health care provider to explain it to you before you take the OTC medicine.  Managing  nausea  Some people have an upset stomach after surgery. This is often because of anesthesia, pain, or pain medicine, or the stress of surgery. These tips will help you handle nausea and eat healthy foods as you get better. If you were on a special food plan before surgery, ask your doctor if you should follow it while you get better. These tips may help:  · Do not push yourself to eat. Your body will tell you when to eat and how much.  · Start off with clear liquids and soup. They are easier to digest.  · Next try semi-solid foods, such as mashed potatoes, applesauce, and gelatin, as you feel ready.  · Slowly move to solid foods. Dont eat fatty, rich, or spicy foods at first.  · Do not force yourself to have 3 large meals a day. Instead eat smaller amounts more often.  · Take pain medicines with a small amount of solid food, such as crackers or toast, to avoid nausea.     Call your surgeon if  · You still have pain an hour after taking medicine. The medicine may not be strong enough.  · You feel too sleepy, dizzy, or groggy. The medicine may be too strong.  · You have side effects like nausea, vomiting, or skin changes, such as rash, itching, or hives.       If you have obstructive sleep apnea  You were given anesthesia medicine during surgery to keep you comfortable and free of pain. After surgery, you may have more apnea spells because of this medicine and other medicines you were given. The spells may last longer than usual.   At home:  · Keep using the continuous positive airway pressure (CPAP) device when you sleep. Unless your health care provider tells you not to, use it when you sleep, day or night. CPAP is a common device used to treat obstructive sleep apnea.  · Talk with your provider before taking any pain medicine, muscle relaxants, or sedatives. Your provider will tell you about the possible dangers of taking these medicines.  © 0714-9286 [employer]. [address] [place] [address]  [place] [address]. All rights reserved. This information is not intended as a substitute for professional medical care. Always follow your healthcare professional's instructions.    Gastritis (Adult)    Gastritis is inflammation and irritation of the stomach lining. It can be present for a short time (acute) or be long lasting (chronic). Gastritis is often caused by infection with bacteria called H pylori. More than a third of people in the [place] have this bacteria in their bodies. In many cases, H pylori causes no problems or symptoms. In some people, though, the infection irritates the stomach lining and causes gastritis. Other causes of stomach irritation include drinking alcohol or taking pain-relieving medicines called NSAIDs (such as aspirin or ibuprofen).   Symptoms of gastritis can include:  · Abdominal pain or bloating  · Loss of appetite  · Nausea or vomiting  · Vomiting blood or having black stools  · Feeling more tired than usual  An inflamed and irritated stomach lining is more likely to develop a sore called an ulcer. To help prevent this, gastritis should be treated.  Home care  If needed, medicines may be prescribed. If you have H pylori infection, treating it will likely relieve your symptoms. Other changes can help reduce stomach irritation and help it heal.  · If you have been prescribed medicines for H pylori infection, take them as directed. Take all of the medicine until it is finished or your healthcare provider tells you to stop, even if you feel better.  · Your healthcare provider may recommend avoiding NSAIDs. If you take daily aspirin for your heart or other medical reasons, do not stop without talking to your healthcare provider first.  · Avoid drinking alcohol.  · Stop smoking. Smoking can irritate the stomach and delay healing. As much as possible, stay away from second hand smoke.  Follow-up care  Follow up with your healthcare provider, or as advised by our staff. Testing may be needed to check  for inflammation or an ulcer.  When to seek medical advice  Call your healthcare provider for any of the following:  · Stomach pain that gets worse or moves to the lower right abdomen (appendix area)  · Chest pain that appears or gets worse, or spreads to the back, neck, shoulder, or arm  · Frequent vomiting (cant keep down liquids)  · Blood in the stool or vomit (red or black in color)  · Feeling weak or dizzy  · Fever of 100.4ºF (38ºC) or higher, or as directed by your healthcare provider  Date Last Reviewed: 6/22/2015 © 2000-2016 [employer]. [address] [place] [address]. All rights reserved. This information is not intended as a substitute for professional medical care. Always follow your healthcare professional's instructions.      Pt AAO, VSS, pt denies pain or nausea, tolerating sips of water. Spouse at bedside.

## 2017-05-23 NOTE — ANESTHESIA POSTPROCEDURE EVALUATION
"Anesthesia Post Evaluation    Patient: Rios Buck    Procedure(s) Performed: Procedure(s) (LRB):  ESOPHAGOGASTRODUODENOSCOPY (EGD) (N/A)    Final Anesthesia Type: general  Patient location during evaluation: PACU  Patient participation: Yes- Able to Participate  Level of consciousness: awake and alert and oriented  Post-procedure vital signs: reviewed and stable  Pain management: adequate  Airway patency: patent  PONV status at discharge: No PONV  Anesthetic complications: no      Cardiovascular status: blood pressure returned to baseline  Respiratory status: unassisted, spontaneous ventilation and room air  Hydration status: euvolemic  Follow-up not needed.        Visit Vitals  /70   Pulse 72   Temp 37.1 °C (98.8 °F) (Temporal)   Resp 16   Ht 5' 7" (1.702 m)   Wt 72.6 kg (160 lb)   SpO2 99%   BMI 25.06 kg/m²       Pain/Thony Score: Pain Assessment Performed: Yes (5/23/2017  8:50 AM)  Presence of Pain: denies (5/23/2017  8:50 AM)  Thony Score: 10 (5/23/2017  8:50 AM)      "

## 2017-05-23 NOTE — H&P (VIEW-ONLY)
Subjective:       Patient ID: Rios Buck is a 41 y.o. male Body mass index is 28.22 kg/(m^2).    Chief Complaint: Emesis; Nausea; Bloated; and Gas    Established patient of Dr. Saez & myself.    GI Problem   The primary symptoms include fatigue, abdominal pain, nausea (occurs before cramping occurs), vomiting (twice last week; emesis of food contents, denies bright red blood or coffee ground emesis) and diarrhea (once a week; is constipated with no bowel movements for a week and when he does have a bowel movement it starts off with a few small pellet-like stool and then progresses to large amount of loose stool). Primary symptoms do not include fever, weight loss, melena, hematemesis, hematochezia or dysuria. The illness began more than 7 days ago (constipation started several years ago). The problem has not changed (had improved after our last visit but recurred over the past few months) since onset.  The abdominal pain began more than 2 days ago (recurred a few months ago). The abdominal pain has been unchanged since its onset. The abdominal pain is located in the suprapubic region and LLQ (described cramping pain before diarrhea). The abdominal pain does not radiate. Relieved by: lying; worse after eating.   The illness is also significant for bloating and constipation (chronic: bowel movements once a week starts with hard stool and progresses to loose stool; past: linzess (diarrhea), lactulose (no relief); currently taking colace prn). The illness does not include dysphagia, odynophagia or back pain. Associated symptoms comments: Lots of belching and flatulence recently; early satiety  Takes ibuprofen about twice a week prn bodyaches  Past treatment: bentyl caused dizziness- unable to continue taking. Significant associated medical issues include GERD (controlled with protonix 20 mg once daily and diet/lifestyle modifications, occasional breakthrough if he eats certain trigger foods), PUD, irritable  bowel syndrome and hemorrhoids. Associated medical issues do not include inflammatory bowel disease, gastric bypass, bowel resection or diverticulitis. Associated medical issues comments: history of h pylori.     Review of Systems   Constitutional: Positive for fatigue. Negative for appetite change, fever, unexpected weight change and weight loss.   HENT: Negative for mouth sores, trouble swallowing and voice change.    Respiratory: Negative for choking, chest tightness and shortness of breath.    Cardiovascular: Negative for palpitations.   Gastrointestinal: Positive for abdominal pain, bloating, constipation (chronic: bowel movements once a week starts with hard stool and progresses to loose stool; past: linzess (diarrhea), lactulose (no relief); currently taking colace prn), diarrhea (once a week; is constipated with no bowel movements for a week and when he does have a bowel movement it starts off with a few small pellet-like stool and then progresses to large amount of loose stool), nausea (occurs before cramping occurs) and vomiting (twice last week; emesis of food contents, denies bright red blood or coffee ground emesis). Negative for anal bleeding, blood in stool, dysphagia, hematemesis, hematochezia, melena and rectal pain.   Genitourinary: Negative for difficulty urinating, dysuria, flank pain and urgency.   Musculoskeletal: Negative for back pain.   Hematological:        Reports remains slightly anemic.       Past Medical History:   Diagnosis Date    Colon polyp     Epidermoid cyst of testis     left    H. pylori infection     Hydrocele, left     IBS (irritable bowel syndrome)     Peptic ulcer disease      Past Surgical History:   Procedure Laterality Date    COLONOSCOPY  06/2015    repeat in 5 years for surveillance    TONSILLECTOMY      UPPER GASTROINTESTINAL ENDOSCOPY  05/2015    repeat in 8 weeks to reassess ulcer for healing     Family History   Problem Relation Age of Onset    Diabetes  Mother     Heart disease Mother 50     CABG x3    Cancer Maternal Grandfather 64     colon cancer    Colon cancer Maternal Grandfather 65    Crohn's disease Neg Hx     Ulcerative colitis Neg Hx     Celiac disease Neg Hx      Wt Readings from Last 10 Encounters:   04/25/17 79.3 kg (174 lb 13.2 oz)   01/21/17 72.6 kg (160 lb)   07/14/16 74.3 kg (163 lb 12.8 oz)   06/10/16 74.3 kg (163 lb 12.8 oz)   01/26/16 76.3 kg (168 lb 3.4 oz)   07/07/15 68 kg (150 lb)   07/06/15 68 kg (150 lb)   06/15/15 70.3 kg (155 lb)   05/18/15 72.6 kg (160 lb)   05/14/15 73 kg (160 lb 14.4 oz)     Lab Results   Component Value Date    WBC 6.49 07/14/2016    HGB 13.3 (L) 07/14/2016    HCT 38.0 (L) 07/14/2016    MCV 88 07/14/2016     07/14/2016     CMP  Sodium   Date Value Ref Range Status   07/14/2016 141 136 - 145 mmol/L Final     Potassium   Date Value Ref Range Status   07/14/2016 4.2 3.5 - 5.1 mmol/L Final     Chloride   Date Value Ref Range Status   07/14/2016 104 95 - 110 mmol/L Final     CO2   Date Value Ref Range Status   07/14/2016 28 23 - 29 mmol/L Final     Glucose   Date Value Ref Range Status   07/14/2016 129 (H) 70 - 110 mg/dL Final     BUN, Bld   Date Value Ref Range Status   07/14/2016 15 6 - 20 mg/dL Final     Creatinine   Date Value Ref Range Status   07/14/2016 1.1 0.5 - 1.4 mg/dL Final     Calcium   Date Value Ref Range Status   07/14/2016 8.9 8.7 - 10.5 mg/dL Final     Total Protein   Date Value Ref Range Status   07/14/2016 6.7 6.0 - 8.4 g/dL Final     Albumin   Date Value Ref Range Status   07/14/2016 4.0 3.5 - 5.2 g/dL Final     Total Bilirubin   Date Value Ref Range Status   07/14/2016 0.5 0.1 - 1.0 mg/dL Final     Comment:     For infants and newborns, interpretation of results should be based  on gestational age, weight and in agreement with clinical  observations.  Premature Infant recommended reference ranges:  Up to 24 hours.............<8.0 mg/dL  Up to 48 hours............<12.0 mg/dL  3-5  "days..................<15.0 mg/dL  6-29 days.................<15.0 mg/dL       Alkaline Phosphatase   Date Value Ref Range Status   07/14/2016 73 55 - 135 U/L Final     AST   Date Value Ref Range Status   07/14/2016 17 10 - 40 U/L Final     ALT   Date Value Ref Range Status   07/14/2016 15 10 - 44 U/L Final     Anion Gap   Date Value Ref Range Status   07/14/2016 9 8 - 16 mmol/L Final     eGFR if    Date Value Ref Range Status   07/14/2016 >60.0 >60 mL/min/1.73 m^2 Final     eGFR if non    Date Value Ref Range Status   07/14/2016 >60.0 >60 mL/min/1.73 m^2 Final     Comment:     Calculation used to obtain the estimated glomerular filtration  rate (eGFR) is the CKD-EPI equation. Since race is unknown   in our information system, the eGFR values for   -American and Non--American patients are given   for each creatinine result.       Lab Results   Component Value Date    TSH 0.431 01/31/2016     Lab Results   Component Value Date    VLXHDJTG11 1624 (H) 06/16/2016     Lab Results   Component Value Date    OCCULTBLOOD Negative 07/25/2016     Reviewed prior medical records including radiology report of 7/14/16 abdominal x-ray, 7/25/16 h pylori stool & other stool studies (negative), 1/31/16 celiac blood work (negative), & endoscopy history (see surgical history).    6/15/2015 Colonoscopy was reviewed and procedure report states:   " Impression:          - Non-bleeding internal hemorrhoids.                       - The rectum, sigmoid colon, descending colon,                        transverse colon, ascending colon, cecum,                        appendiceal orifice and ileocecal valve are normal.                        Biopsied.                       - The examined portion of the ileum was normal.  Recommendation:      - Patient has a contact number available for                        emergencies. The signs and symptoms of potential                        delayed complications " "were discussed with the                        patient. Return to normal activities tomorrow.                        Written discharge instructions were provided to the                        patient.                       - High fiber diet.                       - Continue present medications.                       - Await pathology results.                       - Repeat colonoscopy in 5 years for surveillance.                       - Discharge patient to home (ambulatory).                       - Return to my office after studies are complete. ".  Biopsy results:   "1,2. COLON, RANDOM RIGHT AND RANDOM LEFT (BIOPSIES):  -Colonic mucosa with no significant pathologic changes  -Negative for active inflammation  -No features of microscopic colitis"    5/2015 EGD was reviewed and procedure report states:   " Impression:          1. Normal upper esophagus                       2. Single tongue of salmon colored tissue from 38 to                        40 cm                       3. Erosive gastritis                       4. Peptic ulcer disease                       5. Normal duodenum  Recommendation:      - Patient has a contact number available for                        emergencies. The signs and symptoms of potential                        delayed complications were discussed with the                        patient. Return to normal activities tomorrow.                        Written discharge instructions were provided to the                        patient.                       - Discharge patient to home (ambulatory).                       1. Daily PPI                       2. Will give carafate x 10 days                       3. Avoid NSAIDs                       4. Follow up in my office in 8 weeks                       5. Repeat EGD in 8-12 weeks to examine for ulcer                        healing                       6. Further recommendations to follow after above ".  Biopsy results:   "Specimen #2: " "Positive for H. pylori species by immunostain.  Specimen #3: Positive for H. pylori species by immunostain.  Stain controls are reviewed and are appropriately reactive"  "1. Duodenum, biopsy:  Unremarkable small bowel mucosa with adequate villi.  Negative for active inflammation. No histopathologic evidence of sprue.  Negative for dysplasia.  2. Stomach, antrum/body, biopsy:  Mild acute erosive gastritis with reactive/regenerative changes.  Immunostain for H. pylori species is pending; supplemental report to follow.  Negative for dysplasia.  3. Stomach, cardia, biopsy:  Moderate chronic gastritis with reactive/regenerative changes.  Negative for active inflammation.  Immunostain for H. pylori species is pending; supplemental report to follow.  Negative for dysplasia.  4. Esophagus, biopsy:  Squamous and gastric-type mucosa.  Negative for intraepithelial eosinophils and neutrophils.  Moderate chronic gastritis with reactive/regenerative changes.  Negative for intestinal metaplasia, dysplasia, or malignancy."  Objective:      Physical Exam   Constitutional: He is oriented to person, place, and time. He appears well-developed and well-nourished. No distress.   HENT:   Head: Atraumatic.   Mouth/Throat: Oropharynx is clear and moist and mucous membranes are normal. No oral lesions. No oropharyngeal exudate.   Eyes: Conjunctivae are normal. Pupils are equal, round, and reactive to light. No scleral icterus.   Neck: Normal range of motion. Neck supple. No tracheal deviation present. No thyromegaly present.   Cardiovascular: Normal rate and regular rhythm.    Pulmonary/Chest: Effort normal and breath sounds normal. No respiratory distress. He has no wheezes. He has no rhonchi. He has no rales. He exhibits no tenderness.   Abdominal: Soft. Normal appearance and bowel sounds are normal. He exhibits no distension, no abdominal bruit, no ascites and no mass. There is no hepatosplenomegaly. There is no tenderness. There is no " rigidity, no rebound, no guarding, no tenderness at McBurney's point and negative Ty's sign. No hernia.   Lymphadenopathy:     He has no cervical adenopathy.   Neurological: He is alert and oriented to person, place, and time.   Skin: Skin is warm and dry. No rash noted. He is not diaphoretic. No erythema. No pallor.   Non-jaundiced   Psychiatric: He has a normal mood and affect. His behavior is normal.   Nursing note and vitals reviewed.      Assessment:       1. Abdominal bloating    2. Non-intractable vomiting with nausea, unspecified vomiting type    3. Irritable bowel syndrome with constipation    4. Suprapubic pain    5. History of peptic ulcer    6. Abdominal cramping    7. History of Helicobacter pylori infection    8. History of anemia        Plan:       Abdominal bloating  -  START   lubiprostone (AMITIZA) 8 MCG Cap; Take 1 capsule (8 mcg total) by mouth 2 (two) times daily with meals.  Dispense: 60 capsule; Refill: 2  -     CT Abdomen Pelvis With Contrast; Future; Expected date: 4/25/17  -     Case request GI: ESOPHAGOGASTRODUODENOSCOPY (EGD), - schedule EGD, discussed procedure with patient, verbalized understanding  - recommended OTC simethicone as directed, such as Phazyme or Gas-x  -discussed with patient about low gas diet: Reduce or eliminate these foods from your diet: Broccoli, Cauliflower, [employer] sprouts, Cabbage, Cooked dried beans, Carbonated beverages (sparkling water, soda, beer, champagne)  Other Causes Of Excess Gas Include:   1) EATING TOO FAST or TALKING WHILE YOU CHEW may cause you to swallow air. This increases the amount of gas in the stomach and may worsen your symptoms.  --> Chew each mouthful completely before swallowing. Take your time.  2) OVEREATING may increase the feeling of being bloated and cause more gas.  --> When you are full, stop eating.  3) CONSTIPATION can increase the amount of normal intestinal gas.  --> Avoid constipation by increasing the amount of fiber in  your diet by including whole cereal grains, fresh vegetables (except those in the above list) and fresh fruits. High-fiber foods absorb water and carry it out of the body. When increasing the amount of fiber in your diet, you also need to increase the amount of water that you drink. You should drink at least eight 8-ounce glasses of water (two quarts) per day.    Non-intractable vomiting with nausea, unspecified vomiting type  -     CBC auto differential; Future; Expected date: 4/25/17  -     Comprehensive metabolic panel; Future; Expected date: 4/25/17  -     Urinalysis; Future; Expected date: 4/25/17  -     CT Abdomen Pelvis With Contrast; Future; Expected date: 4/25/17  -  INCREASE to   pantoprazole (PROTONIX) 40 MG tablet; Take 1 tablet (40 mg total) by mouth once daily.  Dispense: 30 tablet; Refill: 3  -     Case request GI: ESOPHAGOGASTRODUODENOSCOPY (EGD), - schedule EGD, discussed procedure with patient, verbalized understanding  - declined anti-emetic    Irritable bowel syndrome with constipation  -  START   lubiprostone (AMITIZA) 8 MCG Cap; Take 1 capsule (8 mcg total) by mouth 2 (two) times daily with meals.  Dispense: 60 capsule; Refill: 2  -     CT Abdomen Pelvis With Contrast; Future; Expected date: 4/25/17  Recommended daily exercise, adequate water intake (six 8-oz glasses of water daily), and high fiber diet. OTC fiber supplements are recommended if diet does not reach daily fiber goal (25 grams daily), such as Metamucil, Citrucel, or FiberCon (take as directed, separate from other oral medications by >2 hours).  -Recommend taking a OTC stool softener such as Colace as directed to avoid hard stools and straining with bowel movements PRN  - discussed that diarrhea may be overflow from constipation  - discussed diagnosis with patient in depth, reviewed and gave IBS educational information in AVS, patient verbalized understanding  - recommended OTC probiotics, such as Align or Culturelle, as  directed  - avoid lactose  - drink adequate water intake  -smaller, more frequent meals  -avoid trigger foods    Suprapubic pain  -     CBC auto differential; Future; Expected date: 4/25/17  -     Comprehensive metabolic panel; Future; Expected date: 4/25/17  -     Urinalysis; Future; Expected date: 4/25/17  -     CT Abdomen Pelvis With Contrast; Future; Expected date: 4/25/17  - possible referral to urologist    History of peptic ulcer  -  INCREASE to   pantoprazole (PROTONIX) 40 MG tablet; Take 1 tablet (40 mg total) by mouth once daily.  Dispense: 30 tablet; Refill: 3, - take in the morning before breakfast, discussed about possible long term use of medication (prefer to use lowest effective dose or discontinuing if possible) and discussed the risks & benefits with taking a reflux medication long term, and to take OTC calcium and vitamin d supplements as directed (such as Citracal +D), pt verbalized understanding  -discussed about the different types of medications used to treat reflux and how to use them, antacids can be used PRN for breakthrough heartburn symptoms by reducing stomach acid that is already produced, H2 blockers (zantac) work by limiting the amount acid production, & PPI's work to block acid production and are taken daily, patient verbalized understanding.  -Educated patient on lifestyle modifications to help control/reduce reflux/abdominal pain including: avoid large meals, avoid eating within 2-3 hours of bedtime (avoid late night eating & lying down soon after eating), elevate head of bed if nocturnal symptoms are present, smoking cessation (if current smoker), & weight loss (if overweight).   -Educated to avoid known foods which trigger reflux symptoms & to minimize/avoid high-fat foods, chocolate, caffeine, citrus, alcohol, & tomato products.  -Advised to avoid/limit use of NSAID's, since they can cause GI upset, bleeding, and/or ulcers. If needed, take with food.  -     Case request GI:  ESOPHAGOGASTRODUODENOSCOPY (EGD), - schedule EGD, discussed procedure with patient, verbalized understanding    Abdominal cramping  -     Case request GI: ESOPHAGOGASTRODUODENOSCOPY (EGD), - schedule EGD, discussed procedure with patient, verbalized understanding    History of Helicobacter pylori infection  -     Case request GI: ESOPHAGOGASTRODUODENOSCOPY (EGD), - schedule EGD, discussed procedure with patient & testing for H. Pylori typically performed during EGD (if not can do lab testing), patient verbalized understanding    History of Anemia  - schedule EGD, discussed procedure with patient, verbalized understanding  -follow-up with PCP and/or hematology for continued evaluation and management    Return in about 1 month (around 5/25/2017), or if symptoms worsen or fail to improve.    If no improvement in symptoms or symptoms worsen, call/follow-up at clinic or go to ER.

## 2017-05-23 NOTE — PLAN OF CARE
Discharge instructions given. Pt and spouse verbalized understanding. Pt IV removed top WNL. Pt D/C home with spouse.

## 2017-05-24 VITALS
OXYGEN SATURATION: 99 % | DIASTOLIC BLOOD PRESSURE: 70 MMHG | TEMPERATURE: 99 F | HEART RATE: 72 BPM | SYSTOLIC BLOOD PRESSURE: 110 MMHG | BODY MASS INDEX: 25.11 KG/M2 | RESPIRATION RATE: 16 BRPM | HEIGHT: 67 IN | WEIGHT: 160 LBS

## 2017-05-26 ENCOUNTER — TELEPHONE (OUTPATIENT)
Dept: GASTROENTEROLOGY | Facility: CLINIC | Age: 42
End: 2017-05-26

## 2017-05-26 NOTE — TELEPHONE ENCOUNTER
----- Message from Ye Swift sent at 5/26/2017 10:50 AM CDT -----  Pt called asking for a call back from the nurse/pls call back at [phone]

## 2017-05-30 ENCOUNTER — TELEPHONE (OUTPATIENT)
Dept: GASTROENTEROLOGY | Facility: CLINIC | Age: 42
End: 2017-05-30

## 2017-05-31 NOTE — TELEPHONE ENCOUNTER
Patient notified and understands biopsy results, continue current medications and follow up with NP 6 weeks

## 2017-06-06 ENCOUNTER — TELEPHONE (OUTPATIENT)
Dept: GASTROENTEROLOGY | Facility: CLINIC | Age: 42
End: 2017-06-06

## 2017-06-06 NOTE — TELEPHONE ENCOUNTER
Patient had egd done on 5/23 he states since Saturday he started with diarrhea really bad abd cramping and gas and yesterday and today started with black tarry stools. No fever

## 2017-06-28 ENCOUNTER — OFFICE VISIT (OUTPATIENT)
Dept: FAMILY MEDICINE | Facility: CLINIC | Age: 42
End: 2017-06-28
Payer: COMMERCIAL

## 2017-06-28 VITALS
TEMPERATURE: 98 F | WEIGHT: 186.94 LBS | HEART RATE: 84 BPM | BODY MASS INDEX: 30.04 KG/M2 | RESPIRATION RATE: 18 BRPM | DIASTOLIC BLOOD PRESSURE: 71 MMHG | SYSTOLIC BLOOD PRESSURE: 116 MMHG | OXYGEN SATURATION: 98 % | HEIGHT: 66 IN

## 2017-06-28 DIAGNOSIS — J40 BRONCHITIS: Primary | ICD-10-CM

## 2017-06-28 PROCEDURE — 99999 PR PBB SHADOW E&M-EST. PATIENT-LVL III: CPT | Mod: PBBFAC,,, | Performed by: FAMILY MEDICINE

## 2017-06-28 PROCEDURE — 99214 OFFICE O/P EST MOD 30 MIN: CPT | Mod: S$GLB,,, | Performed by: FAMILY MEDICINE

## 2017-06-28 RX ORDER — ALBUTEROL SULFATE 90 UG/1
2 AEROSOL, METERED RESPIRATORY (INHALATION) 4 TIMES DAILY
Qty: 1 INHALER | Refills: 1 | Status: SHIPPED | OUTPATIENT
Start: 2017-06-28 | End: 2017-09-15 | Stop reason: ALTCHOICE

## 2017-06-28 RX ORDER — HYDROCODONE POLISTIREX AND CHLORPHENIRAMINE POLISTIREX 10; 8 MG/5ML; MG/5ML
5 SUSPENSION, EXTENDED RELEASE ORAL EVERY 12 HOURS PRN
Qty: 115 ML | Refills: 0 | Status: SHIPPED | OUTPATIENT
Start: 2017-06-28 | End: 2017-07-08

## 2017-06-28 RX ORDER — DOXYCYCLINE 100 MG/1
100 CAPSULE ORAL 2 TIMES DAILY
Qty: 20 CAPSULE | Refills: 0 | Status: SHIPPED | OUTPATIENT
Start: 2017-06-28 | End: 2017-07-27

## 2017-06-28 RX ORDER — PSEUDOEPHEDRINE HCL 30 MG
30 TABLET ORAL 3 TIMES DAILY PRN
Qty: 50 TABLET | Refills: 0 | Status: SHIPPED | OUTPATIENT
Start: 2017-06-28 | End: 2017-07-08

## 2017-06-28 NOTE — PROGRESS NOTES
Subjective:       Patient ID: Rios Buck is a 42 y.o. male.    Chief Complaint: Sinus Problem (onset 6/26/17, head congestion with post nasal drip, right ear pain)    Sinus Problem   This is a new problem. The current episode started in the past 7 days. The problem has been rapidly worsening since onset. There has been no fever. Associated symptoms include coughing, headaches, sinus pressure and a sore throat. Pertinent negatives include no shortness of breath.     Review of Systems   Constitutional: Negative for fever.   HENT: Positive for sinus pressure and sore throat.    Respiratory: Positive for cough. Negative for shortness of breath.    Cardiovascular: Negative for chest pain.   Gastrointestinal: Negative for abdominal pain and nausea.   Skin: Negative for rash.   Neurological: Positive for headaches. Negative for numbness.   All other systems reviewed and are negative.      Objective:      Physical Exam   Constitutional: He appears well-developed. No distress.   HENT:   Right Ear: Tympanic membrane normal.   Left Ear: Tympanic membrane normal.   Nose: Mucosal edema present.   Mouth/Throat: Posterior oropharyngeal erythema present.   Neck: Neck supple.   Cardiovascular: Normal rate and regular rhythm.    No murmur heard.  Pulmonary/Chest: Effort normal and breath sounds normal. He has no wheezes. He has no rales.   Lymphadenopathy:     He has no cervical adenopathy.   Skin: Skin is warm and dry.       Assessment:       1. Bronchitis        Plan:         Rios was seen today for sinus problem.    Diagnoses and all orders for this visit:    Bronchitis    Other orders  -     doxycycline (MONODOX) 100 MG capsule; Take 1 capsule (100 mg total) by mouth 2 (two) times daily.  -     albuterol 90 mcg/actuation inhaler; Inhale 2 puffs into the lungs 4 (four) times daily.  -     hydrocodone-chlorpheniramine (TUSSIONEX) 10-8 mg/5 mL suspension; Take 5 mLs by mouth every 12 (twelve) hours as needed.  -      pseudoephedrine (SUDAFED) 30 MG tablet; Take 1 tablet (30 mg total) by mouth 3 (three) times daily as needed for Congestion.

## 2017-06-30 ENCOUNTER — TELEPHONE (OUTPATIENT)
Dept: FAMILY MEDICINE | Facility: CLINIC | Age: 42
End: 2017-06-30

## 2017-06-30 RX ORDER — AMOXICILLIN AND CLAVULANATE POTASSIUM 875; 125 MG/1; MG/1
1 TABLET, FILM COATED ORAL 2 TIMES DAILY
Qty: 20 TABLET | Refills: 0 | Status: SHIPPED | OUTPATIENT
Start: 2017-06-30 | End: 2017-07-10

## 2017-06-30 NOTE — TELEPHONE ENCOUNTER
----- Message from Susanne Maciel sent at 6/30/2017  7:47 AM CDT -----  Contact:  call  //[phone] //bharati rosa    Calling to  Speak to the  Nurse about  antibiotic // please call

## 2017-06-30 NOTE — TELEPHONE ENCOUNTER
Notified patient's wife another RX has been sent to pharmacy.  Advised Mrs Buck to make sure patient take medication on a full stomach.

## 2017-07-27 ENCOUNTER — OFFICE VISIT (OUTPATIENT)
Dept: FAMILY MEDICINE | Facility: CLINIC | Age: 42
End: 2017-07-27
Payer: COMMERCIAL

## 2017-07-27 ENCOUNTER — LAB VISIT (OUTPATIENT)
Dept: LAB | Facility: HOSPITAL | Age: 42
End: 2017-07-27
Attending: FAMILY MEDICINE
Payer: COMMERCIAL

## 2017-07-27 VITALS
BODY MASS INDEX: 29.37 KG/M2 | HEIGHT: 66 IN | DIASTOLIC BLOOD PRESSURE: 87 MMHG | SYSTOLIC BLOOD PRESSURE: 128 MMHG | HEART RATE: 103 BPM | TEMPERATURE: 98 F | WEIGHT: 182.75 LBS

## 2017-07-27 DIAGNOSIS — Z13.220 SCREENING FOR LIPID DISORDERS: ICD-10-CM

## 2017-07-27 DIAGNOSIS — Z82.49 FAMILY HISTORY OF PREMATURE CAD: ICD-10-CM

## 2017-07-27 DIAGNOSIS — R07.9 CHEST PAIN, UNSPECIFIED TYPE: ICD-10-CM

## 2017-07-27 DIAGNOSIS — R07.9 CHEST PAIN, UNSPECIFIED TYPE: Primary | ICD-10-CM

## 2017-07-27 DIAGNOSIS — K29.70 GASTRITIS, PRESENCE OF BLEEDING UNSPECIFIED, UNSPECIFIED CHRONICITY, UNSPECIFIED GASTRITIS TYPE: ICD-10-CM

## 2017-07-27 LAB
CHOLEST/HDLC SERPL: 5 {RATIO}
HDL/CHOLESTEROL RATIO: 20 %
HDLC SERPL-MCNC: 280 MG/DL
HDLC SERPL-MCNC: 56 MG/DL
LDLC SERPL CALC-MCNC: 146.8 MG/DL
NONHDLC SERPL-MCNC: 224 MG/DL
TRIGL SERPL-MCNC: 386 MG/DL

## 2017-07-27 PROCEDURE — 93005 ELECTROCARDIOGRAM TRACING: CPT | Mod: S$GLB,,, | Performed by: FAMILY MEDICINE

## 2017-07-27 PROCEDURE — 93010 ELECTROCARDIOGRAM REPORT: CPT | Mod: S$GLB,,, | Performed by: INTERNAL MEDICINE

## 2017-07-27 PROCEDURE — 99999 PR PBB SHADOW E&M-EST. PATIENT-LVL IV: CPT | Mod: PBBFAC,,, | Performed by: PHYSICIAN ASSISTANT

## 2017-07-27 PROCEDURE — 99214 OFFICE O/P EST MOD 30 MIN: CPT | Mod: S$GLB,,, | Performed by: PHYSICIAN ASSISTANT

## 2017-07-27 PROCEDURE — 36415 COLL VENOUS BLD VENIPUNCTURE: CPT | Mod: PO

## 2017-07-27 PROCEDURE — 80061 LIPID PANEL: CPT

## 2017-07-27 NOTE — PROGRESS NOTES
Subjective:       Patient ID: Rios Buck is a 42 y.o. male.    Chief Complaint: Chest Pain (non active) and Headache    Patient presents for evaluation of chest pain.  He states that for the past 2-3 weeks he has woken up from sleep with substernal chest pain.  Pain is a heavy feeling that radiates to the jaw, anterior neck and left shoulder.  Pain lasts 5-6 minutes.  Pain resolves with time.  He has not tried  Anything for the symptosms. He does not have shortness of breath, nausea, vomiting, diaphoresis with the pain.  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  He had a normal treadmill stress test 2-3 months ago.  His mother had CAD and CABG at age 50.  Patient has gastritis and had endoscopy several months ago.  He takes ppt qd.  He denies heartburn or belching.  He has a history of anxiety but denies currently stress, worry, anxiety, or panic.        Review of Systems   Constitutional: Negative for appetite change, chills, diaphoresis, fatigue, fever and unexpected weight change.   Respiratory: Negative for chest tightness, shortness of breath and wheezing.    Cardiovascular: Positive for chest pain. Negative for palpitations and leg swelling.   Gastrointestinal: Negative for abdominal distention, abdominal pain, anal bleeding, blood in stool, constipation, diarrhea, nausea and vomiting.   Musculoskeletal: Negative for back pain.   Neurological: Positive for headaches. Negative for dizziness, weakness, light-headedness and numbness.       Objective:      Physical Exam   Constitutional: Vital signs are normal. He appears well-developed and well-nourished. He is cooperative. No distress.   HENT:   Head: Normocephalic and atraumatic.   Eyes: Conjunctivae and EOM are normal. No scleral icterus.   Neck: Trachea normal. Carotid bruit is not present. No thyromegaly present.   Cardiovascular: Normal rate, regular rhythm and normal heart sounds.    Pulmonary/Chest: Effort  normal and breath sounds normal.   Abdominal: Soft. Bowel sounds are normal. There is no tenderness.   Musculoskeletal:        Right lower leg: He exhibits no edema.        Left lower leg: He exhibits no edema.   Neurological: He is alert.   Vitals reviewed.      Assessment:       1. Chest pain, unspecified type    2. Family history of premature CAD    3. Screening for lipid disorders    4. Gastritis, presence of bleeding unspecified, unspecified chronicity, unspecified gastritis type        Plan:       Rios was seen today for chest pain and headache.    Diagnoses and all orders for this visit:    Chest pain, unspecified type  -     Family history of premature CAD  -     Ambulatory referral to Cardiology  -     CT Cardiac Scoring; Future  Lipid panel; Future  Further recommendations will be made based on results       Screening for lipid disorders  -     Lipid panel; Future    Gastritis   Increase ppi to bid

## 2017-07-28 DIAGNOSIS — Z87.11 HISTORY OF PEPTIC ULCER: ICD-10-CM

## 2017-07-28 DIAGNOSIS — R11.2 NON-INTRACTABLE VOMITING WITH NAUSEA, UNSPECIFIED VOMITING TYPE: ICD-10-CM

## 2017-07-28 RX ORDER — PANTOPRAZOLE SODIUM 40 MG/1
40 TABLET, DELAYED RELEASE ORAL 2 TIMES DAILY
Qty: 60 TABLET | Refills: 0 | Status: SHIPPED | OUTPATIENT
Start: 2017-07-28 | End: 2018-01-31 | Stop reason: SDUPTHER

## 2017-07-28 NOTE — TELEPHONE ENCOUNTER
----- Message from Sweetie Betancourt sent at 7/28/2017  2:21 PM CDT -----  Contact: Nick/wife  Nick called and stated the patient's Protonix was increased to 2 times daily as of yesterday and he is going to run out. He needs a refill sent to.    [facility]/pharmacy #[id number] - [place] - [address] AT [facility]  [address]  [place] [address]  Phone: [phone] Fax: [phone]    She can be contacted at [phone].    Thanks,  Sweetie

## 2017-07-31 ENCOUNTER — TELEPHONE (OUTPATIENT)
Dept: FAMILY MEDICINE | Facility: CLINIC | Age: 42
End: 2017-07-31

## 2017-07-31 NOTE — TELEPHONE ENCOUNTER
----- Message from Tonya Barraza sent at 7/31/2017 12:47 PM CDT -----  Contact: Nick  Patient's wife is calling to check on results from 7/27/17. Also during visit discussed anxiety, patient did not at that time want any medication, but now wishes to start medication. Previous medication Zoloft, Paxil, Celexa, and Klonopin---has had side effects of dizziness and confusion with these medications. Asking if Rx Lexapro low dosage is an option.     [facility]/pharmacy #[id number] - [place] - [address] AT [facility]  [address]  [place] [address]  Phone: [phone] Fax: [phone]    Please call to advise [phone] and can leave message. Thanks!

## 2017-07-31 NOTE — TELEPHONE ENCOUNTER
----- Message from Jose Fair sent at 7/31/2017  2:59 PM CDT -----  Contact: Wife/Nick  Nick called in and stated that someone called patient with lab results but did not address the issue regarding getting a Rx for Lexapro that had been offered to patient.      [facility]/pharmacy #[id number] - [place] - [address] AT [facility]  [address]  [place] [address]  Phone: [phone] Fax: [phone]    Nick's call back number is [phone] and a message can be left on voicemail

## 2017-07-31 NOTE — TELEPHONE ENCOUNTER
Please see result note regarding lab results    When he was here I specifically asked about his anxiety but he denied symptoms   I would be happy to prescribe a medication but I will need to see him again to clarify

## 2017-07-31 NOTE — TELEPHONE ENCOUNTER
Talked with wife, Nick; she will let patient know that appointment is needed. He has 8/25/17 with PCP/team, if he would like sooner appointment he will call.

## 2017-08-02 ENCOUNTER — HOSPITAL ENCOUNTER (OUTPATIENT)
Dept: RADIOLOGY | Facility: HOSPITAL | Age: 42
Discharge: HOME | End: 2017-08-02
Attending: FAMILY MEDICINE
Payer: COMMERCIAL

## 2017-08-02 DIAGNOSIS — Z82.49 FAMILY HISTORY OF PREMATURE CAD: ICD-10-CM

## 2017-08-02 PROCEDURE — 75571 CT HRT W/O DYE W/CA TEST: CPT | Mod: TC

## 2017-08-02 PROCEDURE — 75571 CT HRT W/O DYE W/CA TEST: CPT | Mod: 26,,, | Performed by: RADIOLOGY

## 2017-08-03 ENCOUNTER — TELEPHONE (OUTPATIENT)
Dept: FAMILY MEDICINE | Facility: CLINIC | Age: 42
End: 2017-08-03

## 2017-08-03 NOTE — TELEPHONE ENCOUNTER
----- Message from APRYL Payton sent at 8/3/2017 12:32 PM CDT -----  Please let patient know that CT calcium score is great.  There is less than 5% chance of coronary artery disease.  I would like recommend cardiology follow up for further recommendations

## 2017-08-04 NOTE — TELEPHONE ENCOUNTER
Message for patient; talked with wife, Nick, when asked she voices udnerstanding. Will discuss with patient

## 2017-09-14 ENCOUNTER — DOCUMENTATION ONLY (OUTPATIENT)
Dept: FAMILY MEDICINE | Facility: CLINIC | Age: 42
End: 2017-09-14

## 2017-09-14 NOTE — PROGRESS NOTES
Pre-Visit Chart Review  For Appointment Scheduled on 9/15/17    Health Maintenance Due   Topic Date Due    Influenza Vaccine  08/01/2017

## 2017-09-15 ENCOUNTER — OFFICE VISIT (OUTPATIENT)
Dept: FAMILY MEDICINE | Facility: CLINIC | Age: 42
End: 2017-09-15
Payer: COMMERCIAL

## 2017-09-15 ENCOUNTER — TELEPHONE (OUTPATIENT)
Dept: FAMILY MEDICINE | Facility: CLINIC | Age: 42
End: 2017-09-15

## 2017-09-15 VITALS
HEIGHT: 66 IN | DIASTOLIC BLOOD PRESSURE: 65 MMHG | SYSTOLIC BLOOD PRESSURE: 106 MMHG | HEART RATE: 80 BPM | TEMPERATURE: 98 F | WEIGHT: 184.5 LBS | BODY MASS INDEX: 29.65 KG/M2

## 2017-09-15 DIAGNOSIS — G43.109 MIGRAINE WITH AURA AND WITHOUT STATUS MIGRAINOSUS, NOT INTRACTABLE: ICD-10-CM

## 2017-09-15 DIAGNOSIS — F41.9 ANXIETY: Primary | ICD-10-CM

## 2017-09-15 PROCEDURE — 3008F BODY MASS INDEX DOCD: CPT | Mod: S$GLB,,, | Performed by: NURSE PRACTITIONER

## 2017-09-15 PROCEDURE — 99999 PR PBB SHADOW E&M-EST. PATIENT-LVL III: CPT | Mod: PBBFAC,,, | Performed by: NURSE PRACTITIONER

## 2017-09-15 PROCEDURE — 99214 OFFICE O/P EST MOD 30 MIN: CPT | Mod: S$GLB,,, | Performed by: NURSE PRACTITIONER

## 2017-09-15 RX ORDER — ESCITALOPRAM OXALATE 10 MG/1
TABLET ORAL
Qty: 30 TABLET | Refills: 11 | Status: SHIPPED | OUTPATIENT
Start: 2017-09-15 | End: 2020-09-01 | Stop reason: ALTCHOICE

## 2017-09-15 RX ORDER — SUMATRIPTAN 50 MG/1
TABLET, FILM COATED ORAL
Qty: 15 TABLET | Refills: 5 | Status: SHIPPED | OUTPATIENT
Start: 2017-09-15 | End: 2020-09-01 | Stop reason: ALTCHOICE

## 2017-09-15 NOTE — PATIENT INSTRUCTIONS

## 2017-09-15 NOTE — TELEPHONE ENCOUNTER
----- Message from Rosa Skaggs sent at 9/15/2017  1:04 PM CDT -----  Contact: Patient  Patient states that he had gotten off work early and would like to be seen sooner than 4:00pm.  Can you please call the patient back at [phone].  Thank you

## 2017-09-17 NOTE — PROGRESS NOTES
Subjective:       Patient ID: Rios Buck is a 42 y.o. male.    Chief Complaint: Headache    Mr. Buck presents to the clinic today for headaches and anxiety.  He has taken paroxetine for BONITA and states he does not like the side effects which include feeling sleepy and low libido/inability to ejaculate.  He would like to discuss a different medication for anxiety.  He reports he becomes very irritable and angry when he is anxious.  He did stop taking the paroxetine due to the side effects and noted that he has had an increase in headaches.  States he has had migraines in the past and these are similar.  They are relieved by lying in a dark room.  He has not tried a triptan for migraines.  He usually takes OTC headache medication with minimal relief.  He denies SI/HI.      Review of Systems   Constitutional: Negative for chills and fever.   Eyes: Negative for visual disturbance.   Respiratory: Negative for cough, shortness of breath and wheezing.    Cardiovascular: Negative for chest pain, palpitations and leg swelling.   Gastrointestinal: Negative for abdominal pain, constipation and diarrhea.   Neurological: Positive for headaches. Negative for dizziness, weakness and numbness.   Psychiatric/Behavioral: Positive for agitation and dysphoric mood. Negative for sleep disturbance and suicidal ideas. The patient is nervous/anxious.        Objective:      Physical Exam   Constitutional: He is oriented to person, place, and time. He appears well-developed and well-nourished. No distress.   HENT:   Head: Normocephalic and atraumatic.   Right Ear: External ear normal.   Left Ear: External ear normal.   Mouth/Throat: Oropharynx is clear and moist. No oropharyngeal exudate.   Eyes: Pupils are equal, round, and reactive to light. Right eye exhibits no discharge. Left eye exhibits no discharge.   Neck: Neck supple. No thyromegaly present.   Cardiovascular: Normal rate and regular rhythm.  Exam reveals no gallop and  no friction rub.    No murmur heard.  Pulmonary/Chest: Effort normal and breath sounds normal. No respiratory distress. He has no wheezes. He has no rales.   Abdominal: Soft. He exhibits no distension. There is no tenderness.   Lymphadenopathy:     He has no cervical adenopathy.   Neurological: He is alert and oriented to person, place, and time. No cranial nerve deficit or sensory deficit. Coordination normal.   Normal finger to nose   Skin: Skin is warm and dry.   Psychiatric: He has a normal mood and affect. His behavior is normal. Thought content normal.   Vitals reviewed.          Current Outpatient Prescriptions:     acetaminophen (TYLENOL) 325 MG tablet, Take 325 mg by mouth every 6 (six) hours as needed for Pain., Disp: , Rfl:     CYANOCOBALAMIN, VITAMIN B-12, (VITAMIN B-12 ORAL), Take 1 each by mouth once daily., Disp: , Rfl:     ibuprofen (ADVIL,MOTRIN) 600 MG tablet, Take 1 tablet (600 mg total) by mouth every 6 (six) hours as needed for Pain., Disp: 20 tablet, Rfl: 0    lubiprostone (AMITIZA) 8 MCG Cap, Take 1 capsule (8 mcg total) by mouth 2 (two) times daily with meals., Disp: 60 capsule, Rfl: 2    pantoprazole (PROTONIX) 40 MG tablet, Take 1 tablet (40 mg total) by mouth 2 (two) times daily., Disp: 60 tablet, Rfl: 0    escitalopram oxalate (LEXAPRO) 10 MG tablet, Take 5 mg (0.5 tab) daily x 1 week and then 10 mg (1 tab) daily thereafter., Disp: 30 tablet, Rfl: 11    sumatriptan (IMITREX) 50 MG tablet, Take 1 at onset of severe headache.  If needed may take 1 more pill (to total 2 pills) two hours later.  Do not exceed 2 pills in 24 hours., Disp: 15 tablet, Rfl: 5  Assessment:       1. Anxiety    2. Migraine with aura and without status migrainosus, not intractable        Plan:       Anxiety  RTC 4 weeks.  -     escitalopram oxalate (LEXAPRO) 10 MG tablet; Take 5 mg (0.5 tab) daily x 1 week and then 10 mg (1 tab) daily thereafter.  Dispense: 30 tablet; Refill: 11    Migraine with aura and  without status migrainosus, not intractable  Avoid migraine triggers such as red wine, aged cheese, stressful situations.  Stay hydrated.    -     sumatriptan (IMITREX) 50 MG tablet; Take 1 at onset of severe headache.  If needed may take 1 more pill (to total 2 pills) two hours later.  Do not exceed 2 pills in 24 hours.  Dispense: 15 tablet; Refill: 5

## 2017-10-02 ENCOUNTER — OFFICE VISIT (OUTPATIENT)
Dept: UROLOGY | Facility: CLINIC | Age: 42
End: 2017-10-02
Payer: COMMERCIAL

## 2017-10-02 ENCOUNTER — HOSPITAL ENCOUNTER (OUTPATIENT)
Dept: RADIOLOGY | Facility: HOSPITAL | Age: 42
Discharge: HOME | End: 2017-10-02
Attending: NURSE PRACTITIONER
Payer: COMMERCIAL

## 2017-10-02 VITALS
HEIGHT: 66 IN | DIASTOLIC BLOOD PRESSURE: 76 MMHG | BODY MASS INDEX: 29.55 KG/M2 | SYSTOLIC BLOOD PRESSURE: 138 MMHG | WEIGHT: 183.88 LBS | TEMPERATURE: 98 F | HEART RATE: 91 BPM

## 2017-10-02 DIAGNOSIS — N50.819 TESTICLE PAIN: ICD-10-CM

## 2017-10-02 DIAGNOSIS — R39.15 URINARY URGENCY: ICD-10-CM

## 2017-10-02 DIAGNOSIS — K62.89 RECTAL PAIN: ICD-10-CM

## 2017-10-02 DIAGNOSIS — R35.0 URINARY FREQUENCY: ICD-10-CM

## 2017-10-02 DIAGNOSIS — N50.819 TESTICLE PAIN: Primary | ICD-10-CM

## 2017-10-02 LAB
BILIRUB SERPL-MCNC: NORMAL MG/DL
BLOOD URINE, POC: NORMAL
COLOR, POC UA: YELLOW
GLUCOSE UR QL STRIP: NORMAL
KETONES UR QL STRIP: NORMAL
LEUKOCYTE ESTERASE URINE, POC: NORMAL
NITRITE, POC UA: NORMAL
PH, POC UA: 5
PROTEIN, POC: NORMAL
SPECIFIC GRAVITY, POC UA: 1.01
UROBILINOGEN, POC UA: NORMAL

## 2017-10-02 PROCEDURE — 99204 OFFICE O/P NEW MOD 45 MIN: CPT | Mod: 25,S$GLB,, | Performed by: NURSE PRACTITIONER

## 2017-10-02 PROCEDURE — 76870 US EXAM SCROTUM: CPT | Mod: TC

## 2017-10-02 PROCEDURE — 81001 URINALYSIS AUTO W/SCOPE: CPT | Mod: S$GLB,,, | Performed by: NURSE PRACTITIONER

## 2017-10-02 PROCEDURE — 99999 PR PBB SHADOW E&M-EST. PATIENT-LVL IV: CPT | Mod: PBBFAC,,, | Performed by: NURSE PRACTITIONER

## 2017-10-02 PROCEDURE — 76870 US EXAM SCROTUM: CPT | Mod: 26,,, | Performed by: RADIOLOGY

## 2017-10-02 PROCEDURE — 3008F BODY MASS INDEX DOCD: CPT | Mod: S$GLB,,, | Performed by: NURSE PRACTITIONER

## 2017-10-02 RX ORDER — SULFAMETHOXAZOLE AND TRIMETHOPRIM 800; 160 MG/1; MG/1
1 TABLET ORAL 2 TIMES DAILY
Qty: 42 TABLET | Refills: 0 | Status: SHIPPED | OUTPATIENT
Start: 2017-10-02 | End: 2017-10-23

## 2017-10-02 NOTE — PATIENT INSTRUCTIONS
Use jockstrap or the best supportive underwear he can get for relief of pressure.  -Alternate ice packs/heating pad to areas.  -Take OTC anti-inflammatories  -Sit in a warm tub of water greater than 15 minutes  -Elevate Scrotal Sac      Testicular Pain, Unclear Cause  You have had pain in one or both testicles. Based on your exam today, the exact cause of your pain is not certain. But your condition does not appear to be dangerous. Testicles are very sensitive. Even a small injury can cause quite a bit of pain. Other possible causes of testicular pain include kidney stones, cysts, mumps, inflammatory conditions, chronic conditions, hernia, infection, and a twisted testicle.  Certain tests may be done to rule out an underlying problem causing the pain. Nothing conclusive was found today. Most likely, the pain will go away on its own. If it doesnt, you may need more tests.    Home care  Medicine may be prescribed to help relieve pain and swelling. This may be an over-the-counter pain reliever or prescription pain medication. Take all medicine as directed.  The following are general care guidelines:  · To relieve pain and swelling, apply an ice pack wrapped in a thin towel for 10 minutes at a time. Continue this on and off for 1 to 2 days.  · When lying down, place a small rolled towel under your scrotum. When moving around, wear a jockstrap (athletic supporter) or supportive underwear. These will help support and protect your testicles.  · If it hurts to walk, walk as little as possible until you feel better.  · Avoid strenuous activity until you feel better.  · Do not have sex until you feel better.  · If you have severe pain in the testicle, seek care right away. Delay may lead to permanent loss of the testicles function.  Follow-up care  Follow up with your healthcare provider, or as advised.  When to seek medical advice  Call your healthcare provider right away if any of these occur:  · Fever of 100.4°F (38°C)  or higher  · Worsening of the pain or severe pain  · Swelling of the testicle or scrotum  · A lump in the scrotum  · Warm and red scrotum (signs of infection)  · Nausea and vomiting  · Pain or swelling in abdomen  · Trouble urinating  · Numbness or weakness in the leg  · Shrinking of the testicle  · Blood in your urine  Date Last Reviewed: 10/1/2016  © 3987-2297 [employer]. [address] [place] [address], [place] [address]. All rights reserved. This information is not intended as a substitute for professional medical care. Always follow your healthcare professional's instructions.

## 2017-10-02 NOTE — PROGRESS NOTES
[facility] Note  Staff: ROBE Patel    Referring provider and please cc:   PCP: Dr. Missy Olivo     Chief Complaint: Testicle pain, increased urinary frequency    Subjective:        HPI: Rios Buck is a 42 y.o. male presents with testicle pain and increased urinary frequency x two weeks and is progressively getting worse.  Pt and wife tried couple times within last week to have sexual relations, but had to stop due to the pain.    Questions asked the pt during   Urgency: Yes, urge incontinence? None  NTF: Last night was 10x  Dysuria: No  Gross Hematuria:No  Straining:No, Hesistancy:No, Intermittency:No}, Weak stream:No  ED:No  STDs in past: No  Vasectomy: None  Last PSA Screening: No results found for: PSA, PSADIAG    REVIEW OF SYSTEMS:  Review of Systems   Constitutional: Negative for chills, diaphoresis, fever and weight loss.   HENT: Negative for congestion, hearing loss, nosebleeds and sore throat.    Eyes: Negative for blurred vision and pain.   Respiratory: Negative for cough and wheezing.    Cardiovascular: Negative for chest pain, palpitations and leg swelling.   Gastrointestinal: Negative for abdominal pain, heartburn, nausea and vomiting.   Genitourinary: Positive for frequency and urgency. Negative for dysuria, flank pain and hematuria.        Bilateral testicle pain   Musculoskeletal: Negative for back pain, joint pain, myalgias and neck pain.   Skin: Negative for itching and rash.   Neurological: Negative for dizziness, tremors, sensory change, seizures, loss of consciousness, weakness and headaches.   Endo/Heme/Allergies: Does not bruise/bleed easily.   Psychiatric/Behavioral: Negative for depression and suicidal ideas. The patient is not nervous/anxious.      Physical Exam    PMHx:  Past Medical History:   Diagnosis Date    Colon polyp     Diverticulosis     Epidermoid cyst of testis     left    H. pylori infection     Hydrocele, left     IBS  (irritable bowel syndrome)     Peptic ulcer disease      Kidney stones: No  Cataracts? None    PSHx:  Past Surgical History:   Procedure Laterality Date    ADENOIDECTOMY      COLONOSCOPY  06/2015    repeat in 5 years for surveillance    KNEE ARTHROSCOPY W/ DEBRIDEMENT  2012    right knee     TONSILLECTOMY      UPPER GASTROINTESTINAL ENDOSCOPY  05/2015    repeat in 8 weeks to reassess ulcer for healing     Fam Hx:   malignancies: yes, maternal grandfather had prostate cancer.    kidney stones: No     Allergies:  Doxycycline    Medications: reviewed   Anticoagulation: No    Objective:     Vitals:    10/02/17 1422   BP: 138/76   Pulse: 91   Temp: 98 °F (36.7 °C)     General:WDWN in NAD  Eyes: PERRLA, normal conjunctiva  Respiratory: no increased work on breathing, clear to auscultation  Cardiovascular: regular rate and rhythm. No obvious extremity edema.  GI: palpation of masses. No tenderness. No hepatosplenomegaly to palpation.  Musculoskeletal: normal range of motion of bilateral upper extremities. Normal muscle strength and tone.  Skin: no obvious rashes or lesions. No tightening of skin noted.  Neurologic: CN grossly normal. Normal sensation.   Psychiatric: awake, alert and oriented x 3. Mood and affect normal. Cooperative.    :  Inspection of anus and perineum normal  No scrotal rashes, cysts or lesions  Epididymis normal in size, + tenderness  Testes normal and size, equal size bilaterally, no masses, +tenderness  Urethral meatus normal without discharge  No bilateral inguinal hernias noted     LABS REVIEW:  UA today:  Color:Clear, Yellow  Spec. Grav.  1.010  PH  5.0  Negative for leukocytes, nitrates, protein, glucose, ketones, urobili, bili, and blood.    UCx:   Results for orders placed or performed in visit on 04/16/15   Urine culture   Result Value Ref Range    Urine Culture, Routine No growth      Cr:   Lab Results   Component Value Date    CREATININE 1.2 04/27/2017     Assessment:       1.  Testicle pain    2. Urinary urgency    3. Urinary frequency    4. Rectal pain          Plan:   Testicle pain vs. Prostatitis    1.  Ultrasound of the Scrotum/Testicles  Bactrim DS 1 po BID x 21 days prescribed to pt for possible prostatitis.    2.  The following instructions were given to the patient to assist with discomfort:  -Use jockstrap or the best supportive underwear he can get for relief of pressure.  -Alternate ice packs/heating pad to areas.  -Take OTC anti-inflammatories  -Sit in a warm tub of water greater than 15 minutes  -Elevate Scrotal Sac    F/u TBD    [facility]: Declined    Kyung Nichole, FNP-C

## 2017-10-04 ENCOUNTER — TELEPHONE (OUTPATIENT)
Dept: UROLOGY | Facility: CLINIC | Age: 42
End: 2017-10-04

## 2017-10-04 RX ORDER — HYDROCODONE BITARTRATE AND ACETAMINOPHEN 10; 325 MG/1; MG/1
1 TABLET ORAL EVERY 6 HOURS PRN
Qty: 31 TABLET | Refills: 0 | Status: SHIPPED | OUTPATIENT
Start: 2017-10-04 | End: 2018-04-11

## 2017-10-04 NOTE — TELEPHONE ENCOUNTER
Please call this patient and explain to him that he will need to physically come in to office today to  pain medication prescription.  It will be waiting for him at the check in desk.  Thank you.

## 2017-10-04 NOTE — TELEPHONE ENCOUNTER
----- Message from Freedom Taylor sent at 10/4/2017 10:05 AM CDT -----  Contact: Rios  Calling to see if he can get something different called inn for his pain. Please call back [phone] (home)     [facility]/pharmacy #[id number] - [place], [place] - [address] AT [facility]  [address]  [place] [address]  Phone: [phone] Fax: [phone]    Thanks!

## 2017-11-27 ENCOUNTER — TELEPHONE (OUTPATIENT)
Dept: FAMILY MEDICINE | Facility: CLINIC | Age: 42
End: 2017-11-27

## 2017-11-27 NOTE — TELEPHONE ENCOUNTER
Spoke to patient notified no available appointment today.  Offered appointment for tomorrow patient declined appointment states he will call back

## 2017-11-27 NOTE — TELEPHONE ENCOUNTER
----- Message from Vasile Martinez sent at 11/27/2017  1:29 PM CST -----  Contact: pt  Pt is requesting an appt today(cough/post nasual drip/sore throat)  Call Back#[phone]  Thanks

## 2018-01-31 DIAGNOSIS — Z87.11 HISTORY OF PEPTIC ULCER: ICD-10-CM

## 2018-01-31 DIAGNOSIS — R11.2 NON-INTRACTABLE VOMITING WITH NAUSEA, UNSPECIFIED VOMITING TYPE: ICD-10-CM

## 2018-02-01 RX ORDER — PANTOPRAZOLE SODIUM 40 MG/1
TABLET, DELAYED RELEASE ORAL
Qty: 60 TABLET | Refills: 11 | Status: SHIPPED | OUTPATIENT
Start: 2018-02-01 | End: 2020-05-28 | Stop reason: SDUPTHER

## 2018-02-19 ENCOUNTER — TELEPHONE (OUTPATIENT)
Dept: GASTROENTEROLOGY | Facility: CLINIC | Age: 43
End: 2018-02-19

## 2018-02-19 NOTE — TELEPHONE ENCOUNTER
----- Message from Ravindra Tomas sent at 2/19/2018  2:32 PM CST -----  Contact: omayra christiansen/ [facility] pharmacy in [place]  She's calling in regards to a missed call concerning Rx medication: Amitiza, pls advise, ph# [phone]

## 2018-02-19 NOTE — TELEPHONE ENCOUNTER
----- Message from Vasile Martinez sent at 2/19/2018  2:12 PM CST -----  Contact: pt  Pt is requesting a refill on his Amdia  Call Back#[phone]  Thanks    [facility]/pharmacy #[id number] - [place], [place] - [address] AT [facility]  [address]  [place] [address]  Phone: [phone] Fax: [phone]

## 2018-04-11 ENCOUNTER — OFFICE VISIT (OUTPATIENT)
Dept: ORTHOPEDICS | Facility: CLINIC | Age: 43
End: 2018-04-11
Payer: COMMERCIAL

## 2018-04-11 VITALS — BODY MASS INDEX: 29.41 KG/M2 | HEIGHT: 66 IN | WEIGHT: 183 LBS

## 2018-04-11 DIAGNOSIS — M25.561 ACUTE PAIN OF RIGHT KNEE: Primary | ICD-10-CM

## 2018-04-11 PROCEDURE — 73560 X-RAY EXAM OF KNEE 1 OR 2: CPT | Mod: FY,RT,, | Performed by: ORTHOPAEDIC SURGERY

## 2018-04-11 PROCEDURE — 99213 OFFICE O/P EST LOW 20 MIN: CPT | Mod: 25,,, | Performed by: ORTHOPAEDIC SURGERY

## 2018-04-11 NOTE — PROGRESS NOTES
Past Medical History:   Diagnosis Date    Colon polyp     Diverticulosis     Epidermoid cyst of testis     left    H. pylori infection     Hydrocele, left     IBS (irritable bowel syndrome)     Peptic ulcer disease        Past Surgical History:   Procedure Laterality Date    ADENOIDECTOMY      COLONOSCOPY  06/2015    repeat in 5 years for surveillance    KNEE ARTHROSCOPY W/ DEBRIDEMENT  2012    right knee     TONSILLECTOMY      UPPER GASTROINTESTINAL ENDOSCOPY  05/2015    repeat in 8 weeks to reassess ulcer for healing       Current Outpatient Prescriptions   Medication Sig    CYANOCOBALAMIN, VITAMIN B-12, (VITAMIN B-12 ORAL) Take 1 each by mouth once daily.    escitalopram oxalate (LEXAPRO) 10 MG tablet Take 5 mg (0.5 tab) daily x 1 week and then 10 mg (1 tab) daily thereafter.    lubiprostone (AMITIZA) 8 MCG Cap Take 1 capsule (8 mcg total) by mouth 2 (two) times daily with meals.    pantoprazole (PROTONIX) 40 MG tablet TAKE 1 TABLET BY MOUTH TWICE A DAY    sumatriptan (IMITREX) 50 MG tablet Take 1 at onset of severe headache.  If needed may take 1 more pill (to total 2 pills) two hours later.  Do not exceed 2 pills in 24 hours.     No current facility-administered medications for this visit.        Review of patient's allergies indicates:   Allergen Reactions    Doxycycline        Family History   Problem Relation Age of Onset    Diabetes Mother     Heart disease Mother 50     CABG x3    Cancer Maternal Grandfather 64     colon cancer    Colon cancer Maternal Grandfather 65    Crohn's disease Neg Hx     Ulcerative colitis Neg Hx     Celiac disease Neg Hx        Social History     Social History    Marital status:      Spouse name: N/A    Number of children: N/A    Years of education: N/A     Occupational History    Not on file.     Social History Main Topics    Smoking status: Never Smoker    Smokeless tobacco: Never Used    Alcohol use No    Drug use: No    Sexual  "activity: Not on file     Other Topics Concern    Not on file     Social History Narrative    No narrative on file       Chief Complaint:   Chief Complaint   Patient presents with    Follow-up     Right knee pain. 2.5 years ago.  DOS: 10/22/2015, Right Knee Arthroscopy. Pain in the knee has returned for past 2 weeks. He was working on a [employer] Ro[employer] and thinks may have added a little more stress than normal while kneeling on the roof.        Consulting Physician: No ref. provider found    History of Present Illness:    This is a 42 y.o. year old male who complains of Patient has a two-week history of right knee pain was working on a roof he's been having pain for about 2 weeks  Has a past medical history of a medial      ROS    Examination:    Vital Signs:    Vitals:    04/11/18 1355   Weight: 83 kg (183 lb)   Height: 5' 6" (1.676 m)       This a well-developed, well nourished patient in no acute distress.    Alert and oriented and cooperative to examination.       Physical Exam: Right Knee Exam    Gait:   Normal    Skin  Rash:   None  Scars:   None    Inspection  Erythema:  None  Bruising:  None  Effusion:  mild  Masses:  None  Lymphadenopathy: None    Range of Motion: 0 to 130°    Medial Joint : atient has posterior medial knee pain w  Lateral Joint : None    Patellofemoral Tenderness: None  Patellofemoral Crepitus: None    Lachman:  Normal  Anterior Drawer: Normal  Posterior Drawer: Normal    Elizabeth's:  ositive with medial posterior knee pain  Apley's:  Negative    Varus Stress:  Stable  Valgus Stress:  Stable    Strength:  5/5    Pulses:  Palpable distal    Sensation:  Intact          Imaging: X-rays ordered and reviewed today AP and lateral x-ray of the right kn was negative showed no arthritic changes     Assessment: medial meniscal tear right knee    Plan:   Will get an MRI of the right knee a continue him on anti-inflammatory meds and rest      DISCLAIMER: This note may have been " dictated using voice recognition software and may contain grammatical errors.     NOTE: Consult report sent to referring provider via [facility] EMR.

## 2018-04-23 ENCOUNTER — OFFICE VISIT (OUTPATIENT)
Dept: ORTHOPEDICS | Facility: CLINIC | Age: 43
End: 2018-04-23
Payer: COMMERCIAL

## 2018-04-23 VITALS
DIASTOLIC BLOOD PRESSURE: 70 MMHG | SYSTOLIC BLOOD PRESSURE: 140 MMHG | HEART RATE: 62 BPM | BODY MASS INDEX: 29.41 KG/M2 | HEIGHT: 66 IN | WEIGHT: 183 LBS

## 2018-04-23 DIAGNOSIS — M25.561 ACUTE PAIN OF RIGHT KNEE: Primary | ICD-10-CM

## 2018-04-23 PROCEDURE — 99213 OFFICE O/P EST LOW 20 MIN: CPT | Mod: ,,, | Performed by: ORTHOPAEDIC SURGERY

## 2018-04-23 NOTE — PROGRESS NOTES
Past Medical History:   Diagnosis Date    Colon polyp     Diverticulosis     Epidermoid cyst of testis     left    H. pylori infection     Hydrocele, left     IBS (irritable bowel syndrome)     Peptic ulcer disease        Past Surgical History:   Procedure Laterality Date    ADENOIDECTOMY      COLONOSCOPY  06/2015    repeat in 5 years for surveillance    KNEE ARTHROSCOPY W/ DEBRIDEMENT  2012    right knee     TONSILLECTOMY      UPPER GASTROINTESTINAL ENDOSCOPY  05/2015    repeat in 8 weeks to reassess ulcer for healing       Current Outpatient Prescriptions   Medication Sig    lubiprostone (AMITIZA) 8 MCG Cap Take 1 capsule (8 mcg total) by mouth 2 (two) times daily with meals.    pantoprazole (PROTONIX) 40 MG tablet TAKE 1 TABLET BY MOUTH TWICE A DAY    sumatriptan (IMITREX) 50 MG tablet Take 1 at onset of severe headache.  If needed may take 1 more pill (to total 2 pills) two hours later.  Do not exceed 2 pills in 24 hours.    CYANOCOBALAMIN, VITAMIN B-12, (VITAMIN B-12 ORAL) Take 1 each by mouth once daily.    escitalopram oxalate (LEXAPRO) 10 MG tablet Take 5 mg (0.5 tab) daily x 1 week and then 10 mg (1 tab) daily thereafter.     No current facility-administered medications for this visit.        Review of patient's allergies indicates:   Allergen Reactions    Doxycycline        Family History   Problem Relation Age of Onset    Diabetes Mother     Heart disease Mother 50     CABG x3    Cancer Maternal Grandfather 64     colon cancer    Colon cancer Maternal Grandfather 65    Crohn's disease Neg Hx     Ulcerative colitis Neg Hx     Celiac disease Neg Hx        Social History     Social History    Marital status:      Spouse name: N/A    Number of children: N/A    Years of education: N/A     Occupational History    Not on file.     Social History Main Topics    Smoking status: Never Smoker    Smokeless tobacco: Never Used    Alcohol use No    Drug use: No    Sexual  "activity: Not on file     Other Topics Concern    Not on file     Social History Narrative    No narrative on file       Chief Complaint:   Chief Complaint   Patient presents with    Knee Pain      DOS: 10/22/2015, Right Knee Arthroscopy. Review MRI results,  Hurts when patient squats alot.  Otherwise feeling good.  Ibufrpofen seems to help with the pain. Some achy occures later on during the day.       Consulting Physician: No ref. provider found    History of Present Illness:    This is a 42 y.o. year old male who complains of Patient returns today for follow-up of his right knee pain he has an MRI of the right knee      ROS    Examination:    Vital Signs:    Vitals:    04/23/18 1532   BP: (!) 140/70   Pulse: 62   Weight: 83 kg (183 lb)   Height: 5' 6" (1.676 m)   PainSc:   1       This a well-developed, well nourished patient in no acute distress.    Alert and oriented and cooperative to examination.       Physical Exam: Right knee-patient has good range of motion of the knee has no effusion no palpable mass posteriorly in the popliteal space he has pain on extension of the knee on straight leg raising over the hamstring area medially    Imaging: X-rays ordered and reviewed today patient's MRI of the right knee shows a prior partial medial meniscectomy with no evidence of any residual or recurrent meniscal tear has small joint effusion     Assessment: Hamstring spasm to the right leg no evidence of any meniscal tear on MRI    Plan:  Recommend physical therapy he was instructed on stretching exercises of his hamstring cc using heat and cold packs to his posterior leg take some anti-inflammatory medications and reevaluate him in about 4 weeks      DISCLAIMER: This note may have been dictated using voice recognition software and may contain grammatical errors.     NOTE: Consult report sent to referring provider via [facility] EMR.  "

## 2018-12-10 ENCOUNTER — OFFICE VISIT (OUTPATIENT)
Dept: URGENT CARE | Facility: CLINIC | Age: 43
End: 2018-12-10
Payer: COMMERCIAL

## 2018-12-10 VITALS
HEART RATE: 72 BPM | SYSTOLIC BLOOD PRESSURE: 124 MMHG | RESPIRATION RATE: 16 BRPM | BODY MASS INDEX: 28.12 KG/M2 | WEIGHT: 175 LBS | DIASTOLIC BLOOD PRESSURE: 69 MMHG | TEMPERATURE: 97 F | HEIGHT: 66 IN | OXYGEN SATURATION: 99 %

## 2018-12-10 DIAGNOSIS — R19.7 DIARRHEA, UNSPECIFIED TYPE: ICD-10-CM

## 2018-12-10 DIAGNOSIS — J32.9 SINUSITIS, UNSPECIFIED CHRONICITY, UNSPECIFIED LOCATION: ICD-10-CM

## 2018-12-10 DIAGNOSIS — R05.9 COUGH: ICD-10-CM

## 2018-12-10 DIAGNOSIS — A08.4 VIRAL GASTROENTERITIS: Primary | ICD-10-CM

## 2018-12-10 LAB
BILIRUB UR QL STRIP: NEGATIVE
CTP QC/QA: YES
FLUAV AG NPH QL: NEGATIVE
FLUBV AG NPH QL: NEGATIVE
GLUCOSE UR QL STRIP: NEGATIVE
KETONES UR QL STRIP: NEGATIVE
LEUKOCYTE ESTERASE UR QL STRIP: NEGATIVE
PH, POC UA: 6.5 (ref 5–8)
POC BLOOD, URINE: NEGATIVE
POC NITRATES, URINE: NEGATIVE
PROT UR QL STRIP: NEGATIVE
SP GR UR STRIP: 1.01 (ref 1–1.03)
UROBILINOGEN UR STRIP-ACNC: NORMAL (ref 0.3–2.2)

## 2018-12-10 PROCEDURE — 96372 THER/PROPH/DIAG INJ SC/IM: CPT | Mod: S$GLB,,, | Performed by: NURSE PRACTITIONER

## 2018-12-10 PROCEDURE — 87804 INFLUENZA ASSAY W/OPTIC: CPT | Mod: 59,QW,S$GLB, | Performed by: NURSE PRACTITIONER

## 2018-12-10 PROCEDURE — 81003 URINALYSIS AUTO W/O SCOPE: CPT | Mod: QW,S$GLB,, | Performed by: NURSE PRACTITIONER

## 2018-12-10 PROCEDURE — 99214 OFFICE O/P EST MOD 30 MIN: CPT | Mod: 25,S$GLB,, | Performed by: NURSE PRACTITIONER

## 2018-12-10 PROCEDURE — 71046 X-RAY EXAM CHEST 2 VIEWS: CPT | Mod: S$GLB,,, | Performed by: RADIOLOGY

## 2018-12-10 PROCEDURE — 3008F BODY MASS INDEX DOCD: CPT | Mod: CPTII,S$GLB,, | Performed by: NURSE PRACTITIONER

## 2018-12-10 RX ORDER — FLUTICASONE PROPIONATE 50 MCG
1 SPRAY, SUSPENSION (ML) NASAL DAILY
Qty: 1 BOTTLE | Refills: 0 | Status: SHIPPED | OUTPATIENT
Start: 2018-12-10 | End: 2021-03-03 | Stop reason: CLARIF

## 2018-12-10 RX ORDER — CODEINE PHOSPHATE AND GUAIFENESIN 10; 100 MG/5ML; MG/5ML
10 SOLUTION ORAL EVERY 6 HOURS PRN
Qty: 120 ML | Refills: 0 | Status: SHIPPED | OUTPATIENT
Start: 2018-12-10 | End: 2018-12-20

## 2018-12-10 RX ORDER — BENZONATATE 100 MG/1
200 CAPSULE ORAL 3 TIMES DAILY PRN
Qty: 40 CAPSULE | Refills: 0 | Status: SHIPPED | OUTPATIENT
Start: 2018-12-10 | End: 2019-12-10

## 2018-12-10 RX ORDER — BETAMETHASONE SODIUM PHOSPHATE AND BETAMETHASONE ACETATE 3; 3 MG/ML; MG/ML
9 INJECTION, SUSPENSION INTRA-ARTICULAR; INTRALESIONAL; INTRAMUSCULAR; SOFT TISSUE ONCE
Status: COMPLETED | OUTPATIENT
Start: 2018-12-10 | End: 2018-12-10

## 2018-12-10 RX ADMIN — BETAMETHASONE SODIUM PHOSPHATE AND BETAMETHASONE ACETATE 9 MG: 3; 3 INJECTION, SUSPENSION INTRA-ARTICULAR; INTRALESIONAL; INTRAMUSCULAR; SOFT TISSUE at 09:12

## 2018-12-10 NOTE — PATIENT INSTRUCTIONS
Viral Gastroenteritis (Adult)    Gastroenteritis is commonly called the stomach flu. It is most often caused by a virus that affects the stomach and intestinal tract and usually lasts from 2 to 7 days. Common viruses causing gastroenteritis include norovirus, rotavirus, and hepatitis A. Non-viral causes of gastroenteritis include bacteria, parasites, and toxins.  The danger from repeated vomiting or diarrhea is dehydration. This is the loss of too much fluid from the body. When this occurs, body fluids must be replaced. Antibiotics do not help with this illness because it is usually viral.Simple home treatment will be helpful.  Symptoms of viral gastroenteritis may include:  · Watery, loose stools  · Stomach pain or abdominal cramps  · Fever and chills  · Nausea and vomiting  · Loss of bowel control  · Headache  Home care  Gastroenteritis is transmitted by contact with the stool or vomit of an infected person. This can occur from person to person or from contact with a contaminated surface.  Follow these guidelines when caring for yourself at home:  · If symptoms are severe, rest at home for the next 24 hours or until you are feeling better.  · Wash your hands with soap and water or use alcohol-based  to prevent the spread of infection. Wash your hands after touching anyone who is sick.  · Wash your hands or use alcohol-based  after using the toilet and before meals. Clean the toilet after each use.  Remember these tips when preparing food:  · People with diarrhea should not prepare or serve food to others. When preparing foods, wash your hands before and after.  · Wash your hands after using cutting boards, countertops, knives, or utensils that have been in contact with raw food.  · Keep uncooked meats away from cooked and ready-to-eat foods.  Medicine  You may use acetaminophen or NSAID medicines like ibuprofen or naproxen to control fever unless another medicine was given. If you have chronic  liver or kidney disease, talk with your healthcare provider before using these medicines. Also talk with your provider if you've had a stomach ulcer or gastrointestinal bleeding. Don't give aspirin to anyone under 18 years of age who is ill with a fever. It may cause severe liver damage. Don't use NSAIDS is you are already taking one for another condition (like arthritis) or are on aspirin (such as for heart disease or after a stroke).  If medicine for vomiting or diarrhea are prescribed, take these only as directed. Do not take over-the-counter medicines for vomiting or diarrhea unless instructed by your healthcare provider.  Diet  Follow these guidelines for food:  · Water and liquids are important so you don't get dehydrated. Drink a small amount at a time or suck on ice chips if you are vomiting.  · If you eat, avoid fatty, greasy, spicy, or fried foods.  · Don't eat dairy if you have diarrhea. This can make diarrhea worse.  · Avoid tobacco, alcohol, and caffeine which may worsen symptoms.  During the first 24 hours (the first full day), follow the diet below:  · Beverages. Sports drinks, soft drinks without caffeine, ginger ale, mineral water (plain or flavored), decaffeinated tea and coffee. If you are very dehydrated, sports drinks aren't a good choice. They have too much sugar and not enough electrolytes. In this case, commercially available products called oral rehydration solutions, are best.  · Soups. Eat clear broth, consommé, and bouillon.  · Desserts. Eat gelatin, popsicles, and fruit juice bars.  During the next 24 hours (the second day), you may add the following to the above:  · Hot cereal, plain toast, bread, rolls, and crackers  · Plain noodles, rice, mashed potatoes, chicken noodle or rice soup  · Unsweetened canned fruit (avoid pineapple), bananas  · Limit fat intake to less than 15 grams per day. Do this by avoiding margarine, butter, oils, mayonnaise, sauces, gravies, fried foods, peanut  butter, meat, poultry, and fish.  · Limit fiber and avoid raw or cooked vegetables, fresh fruits (except bananas), and bran cereals.  · Limit caffeine and chocolate. Don't use spices or seasonings other than salt.  · Limit dairy products.  · Avoid alcohol.  During the next 24 hours:  · Gradually resume a normal diet as you feel better and your symptoms improve.  · If at any time it starts getting worse again, go back to clear liquids until you feel better.  Follow-up care  Follow up with your healthcare provider, or as advised. Call your provider if you don't get better within 24 hours or if diarrhea lasts more than a week. Also follow up if you are unable to keep down liquids and get dehydrated. If a stool (diarrhea) sample was taken, call as directed for the results.  Call 911  Call 911 if any of these occur:  · Trouble breathing  · Chest pain  · Confused  · Severe drowsiness or trouble awakening  · Fainting or loss of consciousness  · Rapid heart rate  · Seizure  · Stiff neck  When to seek medical advice  Call your healthcare provider right away if any of these occur:  · Abdominal pain that gets worse  · Continued vomiting (unable to keep liquids down)  · Frequent diarrhea (more than 5 times a day)  · Blood in vomit or stool (black or red color)  · Dark urine, reduced urine output, or extreme thirst  · Weakness or dizziness  · Drowsiness  · Fever of 100.4°F (38°C) oral or higher that does not get better with fever medicine  · New rash  Date Last Reviewed: 1/3/2016  © 1954-5451 [employer]. [address] [place] [address], [place] [address]. All rights reserved. This information is not intended as a substitute for professional medical care. Always follow your healthcare professional's instructions.        Sinusitis (No Antibiotics)    The sinuses are air-filled spaces within the bones of the face. They connect to the inside of the nose. Sinusitis is an inflammation of the tissue lining the sinus cavity. Sinus  inflammation can occur during a cold. It can also be due to allergies to pollens and other particles in the air. It can cause symptoms such as sinus congestion, headache, sore throat, facial swelling and fullness. It may also cause a low-grade fever. No infection is present, and no antibiotic treatment is needed.  Home care  · Drink plenty of water, hot tea, and other liquids. This may help thin mucus. It also may promote sinus drainage.  · Heat may help soothe painful areas of the face. Use a towel soaked in hot water. Or,  the shower and direct the hot spray onto your face. Using a vaporizer along with a menthol rub at night may also help.   · An expectorant containing guaifenesin may help thin the mucus and promote drainage from the sinuses.  · Over-the-counter decongestants may be used unless a similar medicine was prescribed. Nasal sprays work the fastest. Use one that contains phenylephrine or oxymetazoline. First blow the nose gently. Then use the spray. Do not use these medicines more often than directed on the label or symptoms may get worse. You may also use tablets containing pseudoephedrine. Avoid products that combine ingredients, because side effects may be increased. Read labels. You can also ask the pharmacist for help. (NOTE: Persons with high blood pressure should not use decongestants. They can raise blood pressure.)  · Over-the-counter antihistamines may help if allergies contributed to your sinusitis.    · Use acetaminophen or ibuprofen to control pain, unless another pain medicine was prescribed. (If you have chronic liver or kidney disease or ever had a stomach ulcer, talk with your doctor before using these medicines. Aspirin should never be used in anyone under 18 years of age who is ill with a fever. It may cause severe liver damage.)  · Use nasal rinses or irrigation as instructed by your health care provider.  · Don't smoke. This can worsen symptoms.  Follow-up care  Follow up  with your healthcare provider or our staff if you are not improving within the next week.  When to seek medical advice  Call your healthcare provider if any of these occur:  · Green or yellow discharge from the nose or into the throat  · Facial pain or headache becoming more severe  · Stiff neck  · Unusual drowsiness or confusion  · Swelling of the forehead or eyelids  · Vision problems, including blurred or double vision  · Fever of 100.4ºF (38ºC) or higher, or as directed by your healthcare provider  · Seizure  · Breathing problems  · Symptoms not resolving within 10 days  Date Last Reviewed: 4/13/2015  © 0021-0679 [employer]. [address] [place] [address], [place] [address]. All rights reserved. This information is not intended as a substitute for professional medical care. Always follow your healthcare professional's instructions.

## 2018-12-10 NOTE — PROGRESS NOTES
"Subjective:       Patient ID: Rios Buck is a 43 y.o. male.    Vitals:  height is 5' 6" (1.676 m) and weight is 79.4 kg (175 lb). His temperature is 97 °F (36.1 °C). His blood pressure is 124/69 and his pulse is 72. His respiration is 16 and oxygen saturation is 99%.     Chief Complaint: Cough; Nasal Congestion; and Diarrhea    Pt states has been having diarrhea since thursday      Cough   This is a new problem. The current episode started in the past 7 days. The problem has been gradually worsening. The problem occurs constantly. The cough is productive of sputum. Associated symptoms include chills, myalgias, a sore throat and wheezing. Pertinent negatives include no ear pain, eye redness, fever, hemoptysis, rash or shortness of breath. The symptoms are aggravated by lying down. Treatments tried: mucinex tylenol. The treatment provided no relief.   Diarrhea    The current episode started in the past 7 days. The problem occurs 2 to 4 times per day. The problem has been unchanged. The stool consistency is described as watery. The patient states that diarrhea awakens him from sleep. Associated symptoms include chills, coughing and myalgias. Pertinent negatives include no fever or vomiting. There are no known risk factors. Treatments tried: immodium. The treatment provided no relief.       Constitution: Positive for chills. Negative for sweating, fatigue and fever.   HENT: Positive for congestion, sinus pain, sinus pressure and sore throat. Negative for ear pain and voice change.    Neck: Negative for painful lymph nodes.   Eyes: Negative for eye redness.   Respiratory: Positive for cough and wheezing. Negative for chest tightness, sputum production, bloody sputum, COPD, shortness of breath, stridor and asthma.    Gastrointestinal: Positive for diarrhea. Negative for nausea and vomiting.   Musculoskeletal: Positive for muscle ache.   Skin: Negative for rash.   Allergic/Immunologic: Negative for seasonal " allergies and asthma.   Hematologic/Lymphatic: Negative for swollen lymph nodes.       Objective:      Physical Exam   Constitutional: He is oriented to person, place, and time. He appears well-developed and well-nourished.   HENT:   Head: Normocephalic and atraumatic.   Right Ear: External ear normal.   Left Ear: External ear normal.   Nose: Nose normal.   Mouth/Throat: Mucous membranes are normal.   Eyes: Conjunctivae and lids are normal.   Neck: Trachea normal and full passive range of motion without pain. Neck supple.   Cardiovascular: Normal rate, regular rhythm and normal heart sounds.   Pulmonary/Chest: Effort normal and breath sounds normal. No respiratory distress.   Non productive cough throughout exam   Abdominal: Soft. Normal appearance and bowel sounds are normal. He exhibits no distension, no abdominal bruit, no pulsatile midline mass and no mass. There is no tenderness.   Musculoskeletal: Normal range of motion. He exhibits no edema.   Neurological: He is alert and oriented to person, place, and time. He has normal strength.   Skin: Skin is warm, dry and intact. He is not diaphoretic. No pallor.   Psychiatric: He has a normal mood and affect. His speech is normal and behavior is normal. Judgment and thought content normal. Cognition and memory are normal.   Nursing note and vitals reviewed.      Assessment:       1. Viral gastroenteritis    2. Diarrhea, unspecified type    3. Cough    4. Sinusitis, unspecified chronicity, unspecified location        Plan:       Patient Instructions     Viral Gastroenteritis (Adult)    Gastroenteritis is commonly called the stomach flu. It is most often caused by a virus that affects the stomach and intestinal tract and usually lasts from 2 to 7 days. Common viruses causing gastroenteritis include norovirus, rotavirus, and hepatitis A. Non-viral causes of gastroenteritis include bacteria, parasites, and toxins.  The danger from repeated vomiting or diarrhea is  dehydration. This is the loss of too much fluid from the body. When this occurs, body fluids must be replaced. Antibiotics do not help with this illness because it is usually viral.Simple home treatment will be helpful.  Symptoms of viral gastroenteritis may include:  · Watery, loose stools  · Stomach pain or abdominal cramps  · Fever and chills  · Nausea and vomiting  · Loss of bowel control  · Headache  Home care  Gastroenteritis is transmitted by contact with the stool or vomit of an infected person. This can occur from person to person or from contact with a contaminated surface.  Follow these guidelines when caring for yourself at home:  · If symptoms are severe, rest at home for the next 24 hours or until you are feeling better.  · Wash your hands with soap and water or use alcohol-based  to prevent the spread of infection. Wash your hands after touching anyone who is sick.  · Wash your hands or use alcohol-based  after using the toilet and before meals. Clean the toilet after each use.  Remember these tips when preparing food:  · People with diarrhea should not prepare or serve food to others. When preparing foods, wash your hands before and after.  · Wash your hands after using cutting boards, countertops, knives, or utensils that have been in contact with raw food.  · Keep uncooked meats away from cooked and ready-to-eat foods.  Medicine  You may use acetaminophen or NSAID medicines like ibuprofen or naproxen to control fever unless another medicine was given. If you have chronic liver or kidney disease, talk with your healthcare provider before using these medicines. Also talk with your provider if you've had a stomach ulcer or gastrointestinal bleeding. Don't give aspirin to anyone under 18 years of age who is ill with a fever. It may cause severe liver damage. Don't use NSAIDS is you are already taking one for another condition (like arthritis) or are on aspirin (such as for heart  disease or after a stroke).  If medicine for vomiting or diarrhea are prescribed, take these only as directed. Do not take over-the-counter medicines for vomiting or diarrhea unless instructed by your healthcare provider.  Diet  Follow these guidelines for food:  · Water and liquids are important so you don't get dehydrated. Drink a small amount at a time or suck on ice chips if you are vomiting.  · If you eat, avoid fatty, greasy, spicy, or fried foods.  · Don't eat dairy if you have diarrhea. This can make diarrhea worse.  · Avoid tobacco, alcohol, and caffeine which may worsen symptoms.  During the first 24 hours (the first full day), follow the diet below:  · Beverages. Sports drinks, soft drinks without caffeine, ginger ale, mineral water (plain or flavored), decaffeinated tea and coffee. If you are very dehydrated, sports drinks aren't a good choice. They have too much sugar and not enough electrolytes. In this case, commercially available products called oral rehydration solutions, are best.  · Soups. Eat clear broth, consommé, and bouillon.  · Desserts. Eat gelatin, popsicles, and fruit juice bars.  During the next 24 hours (the second day), you may add the following to the above:  · Hot cereal, plain toast, bread, rolls, and crackers  · Plain noodles, rice, mashed potatoes, chicken noodle or rice soup  · Unsweetened canned fruit (avoid pineapple), bananas  · Limit fat intake to less than 15 grams per day. Do this by avoiding margarine, butter, oils, mayonnaise, sauces, gravies, fried foods, peanut butter, meat, poultry, and fish.  · Limit fiber and avoid raw or cooked vegetables, fresh fruits (except bananas), and bran cereals.  · Limit caffeine and chocolate. Don't use spices or seasonings other than salt.  · Limit dairy products.  · Avoid alcohol.  During the next 24 hours:  · Gradually resume a normal diet as you feel better and your symptoms improve.  · If at any time it starts getting worse again, go  back to clear liquids until you feel better.  Follow-up care  Follow up with your healthcare provider, or as advised. Call your provider if you don't get better within 24 hours or if diarrhea lasts more than a week. Also follow up if you are unable to keep down liquids and get dehydrated. If a stool (diarrhea) sample was taken, call as directed for the results.  Call 911  Call 911 if any of these occur:  · Trouble breathing  · Chest pain  · Confused  · Severe drowsiness or trouble awakening  · Fainting or loss of consciousness  · Rapid heart rate  · Seizure  · Stiff neck  When to seek medical advice  Call your healthcare provider right away if any of these occur:  · Abdominal pain that gets worse  · Continued vomiting (unable to keep liquids down)  · Frequent diarrhea (more than 5 times a day)  · Blood in vomit or stool (black or red color)  · Dark urine, reduced urine output, or extreme thirst  · Weakness or dizziness  · Drowsiness  · Fever of 100.4°F (38°C) oral or higher that does not get better with fever medicine  · New rash  Date Last Reviewed: 1/3/2016  © 1181-3308 [employer]. [address] [place] [address]. All rights reserved. This information is not intended as a substitute for professional medical care. Always follow your healthcare professional's instructions.        Sinusitis (No Antibiotics)    The sinuses are air-filled spaces within the bones of the face. They connect to the inside of the nose. Sinusitis is an inflammation of the tissue lining the sinus cavity. Sinus inflammation can occur during a cold. It can also be due to allergies to pollens and other particles in the air. It can cause symptoms such as sinus congestion, headache, sore throat, facial swelling and fullness. It may also cause a low-grade fever. No infection is present, and no antibiotic treatment is needed.  Home care  · Drink plenty of water, hot tea, and other liquids. This may help thin mucus. It also  may promote sinus drainage.  · Heat may help soothe painful areas of the face. Use a towel soaked in hot water. Or,  the shower and direct the hot spray onto your face. Using a vaporizer along with a menthol rub at night may also help.   · An expectorant containing guaifenesin may help thin the mucus and promote drainage from the sinuses.  · Over-the-counter decongestants may be used unless a similar medicine was prescribed. Nasal sprays work the fastest. Use one that contains phenylephrine or oxymetazoline. First blow the nose gently. Then use the spray. Do not use these medicines more often than directed on the label or symptoms may get worse. You may also use tablets containing pseudoephedrine. Avoid products that combine ingredients, because side effects may be increased. Read labels. You can also ask the pharmacist for help. (NOTE: Persons with high blood pressure should not use decongestants. They can raise blood pressure.)  · Over-the-counter antihistamines may help if allergies contributed to your sinusitis.    · Use acetaminophen or ibuprofen to control pain, unless another pain medicine was prescribed. (If you have chronic liver or kidney disease or ever had a stomach ulcer, talk with your doctor before using these medicines. Aspirin should never be used in anyone under 18 years of age who is ill with a fever. It may cause severe liver damage.)  · Use nasal rinses or irrigation as instructed by your health care provider.  · Don't smoke. This can worsen symptoms.  Follow-up care  Follow up with your healthcare provider or our staff if you are not improving within the next week.  When to seek medical advice  Call your healthcare provider if any of these occur:  · Green or yellow discharge from the nose or into the throat  · Facial pain or headache becoming more severe  · Stiff neck  · Unusual drowsiness or confusion  · Swelling of the forehead or eyelids  · Vision problems, including blurred or  double vision  · Fever of 100.4ºF (38ºC) or higher, or as directed by your healthcare provider  · Seizure  · Breathing problems  · Symptoms not resolving within 10 days  Date Last Reviewed: 4/13/2015  © 8422-7418 [employer]. [address] [place] [address], [place] [address]. All rights reserved. This information is not intended as a substitute for professional medical care. Always follow your healthcare professional's instructions.              Viral gastroenteritis    Diarrhea, unspecified type  -     POCT Influenza A/B  -     POCT Urinalysis, Dipstick, Automated, W/O Scope    Cough  -     X-Ray Chest PA And Lateral; Future; Expected date: 12/10/2018    Sinusitis, unspecified chronicity, unspecified location    Other orders  -     benzonatate (TESSALON PERLES) 100 MG capsule; Take 2 capsules (200 mg total) by mouth 3 (three) times daily as needed.  Dispense: 40 capsule; Refill: 0  -     guaifenesin-codeine 100-10 mg/5 ml (CHERATUSSIN AC)  mg/5 mL syrup; Take 10 mLs by mouth every 6 (six) hours as needed for Cough.  Dispense: 120 mL; Refill: 0  -     fluticasone (FLONASE) 50 mcg/actuation nasal spray; 1 spray (50 mcg total) by Each Nare route once daily.  Dispense: 1 Bottle; Refill: 0  -     betamethasone acetate-betamethasone sodium phosphate injection 9 mg

## 2019-06-18 ENCOUNTER — TELEPHONE (OUTPATIENT)
Dept: AUDIOLOGY | Facility: CLINIC | Age: 44
End: 2019-06-18

## 2019-06-18 NOTE — TELEPHONE ENCOUNTER
Spoke with patient who said that he has already scheduled an earlier appt with another provider.  Pt declined scheduling an appt at this time.

## 2019-06-18 NOTE — TELEPHONE ENCOUNTER
----- Message from Eliane Roach sent at 6/18/2019  2:06 PM CDT -----  Type:  Sooner Appointment Request    Caller is requesting a sooner appointment.      Name of Caller:  Wife (Nick)  When is the first available appointment?  August  Symptoms: Dizziness, clogged ears and bilateral ear infection  Best Call Back Number:  [phone]  Additional Information:  Requesting appointment tomorrow if possible

## 2020-05-28 ENCOUNTER — OFFICE VISIT (OUTPATIENT)
Dept: GASTROENTEROLOGY | Facility: CLINIC | Age: 45
End: 2020-05-28
Payer: COMMERCIAL

## 2020-05-28 DIAGNOSIS — K92.1 BLACK STOOL: ICD-10-CM

## 2020-05-28 DIAGNOSIS — R19.7 DIARRHEA, UNSPECIFIED TYPE: ICD-10-CM

## 2020-05-28 DIAGNOSIS — R11.2 NON-INTRACTABLE VOMITING WITH NAUSEA, UNSPECIFIED VOMITING TYPE: ICD-10-CM

## 2020-05-28 DIAGNOSIS — Z87.11 HISTORY OF PEPTIC ULCER: ICD-10-CM

## 2020-05-28 DIAGNOSIS — K52.9 POSTPRANDIAL DIARRHEA: Primary | ICD-10-CM

## 2020-05-28 DIAGNOSIS — Z01.818 PREOP TESTING: ICD-10-CM

## 2020-05-28 PROCEDURE — 99999 PR PBB SHADOW E&M-EST. PATIENT-LVL I: CPT | Mod: PBBFAC,,, | Performed by: INTERNAL MEDICINE

## 2020-05-28 PROCEDURE — 99202 OFFICE O/P NEW SF 15 MIN: CPT | Mod: S$GLB,,, | Performed by: INTERNAL MEDICINE

## 2020-05-28 PROCEDURE — 99202 PR OFFICE/OUTPT VISIT, NEW, LEVL II, 15-29 MIN: ICD-10-PCS | Mod: S$GLB,,, | Performed by: INTERNAL MEDICINE

## 2020-05-28 PROCEDURE — 99999 PR PBB SHADOW E&M-EST. PATIENT-LVL I: ICD-10-PCS | Mod: PBBFAC,,, | Performed by: INTERNAL MEDICINE

## 2020-05-28 RX ORDER — PANTOPRAZOLE SODIUM 40 MG/1
40 TABLET, DELAYED RELEASE ORAL DAILY
Qty: 30 TABLET | Refills: 3 | Status: SHIPPED | OUTPATIENT
Start: 2020-05-28 | End: 2021-03-03 | Stop reason: CLARIF

## 2020-05-28 NOTE — H&P (VIEW-ONLY)
[facility]    CC: Diarrhea    HPI 44 y.o. male with family history of colon cancer presents for evaluation of several weeks of acute, post-prandial diarrhea associated with nocturnal stools and abdominal cramping. He notes his stool has been black as well as red on occasion and that it has a particularly bad smell. He has no sick contacts or recent travel but did take Amoxil last month for a tooth infection. His last EGD was performed in 2017 for epigastric pain and h/o PUD, notable for H.pylori negative gastritis. His last colonoscopy was in 2015 , notable for internal hemorrhoids. He denies weight loss, fevers or chills.    Past Medical History:   Diagnosis Date    Colon polyp     Diverticulosis     Epidermoid cyst of testis     left    H. pylori infection     Hydrocele, left     IBS (irritable bowel syndrome)     Peptic ulcer disease          Review of Systems  General ROS: negative for - chills, fever or weight loss  Cardiovascular ROS: no chest pain or dyspnea on exertion  Gastrointestinal ROS: + diarrhea, + blood in stool, + abdominal cramping    Physical Examination  There were no vitals taken for this visit.  General appearance: alert, cooperative, no distress  HENT: Normocephalic, atraumatic, neck symmetrical, no nasal discharge, sclera anicteric   Lungs: clear to auscultation bilaterally, symmetric chest wall expansion bilaterally  Heart: regular rate and rhythm without rub; no displacement of the PMI   Abdomen: soft, mild ttp across lower abdomen without rebound or guarding, BS active ,no masses   Extremities: extremities symmetric; no clubbing, cyanosis, or edema    Labs:  Lab Results   Component Value Date    WBC 7.60 04/27/2017    HGB 12.8 (L) 04/27/2017    HCT 36.7 (L) 04/27/2017    MCV 89 04/27/2017     04/27/2017           Imaging:  CT abdomen report 2017 was independently visualized and reviewed by me and showed no acute findings    Assessment:   44 y.o. male with  family history of colon cancer presents for evaluation of several weeks of post-prandial, foul smelling diarrhea with prior black and bloody stools that have resolved.   Plan:  -Check stool for infection  -CBC, CMP, CRP, IgA, TTG,   -Begin daily PPI  -Schedule EGD with biopsies for celiac disease and H.pylori, schedule colonoscopy with TI intubation and random biopsies     Sheila Adams MD  [facility]  [address], [address]  [facility] [place] [address]  Office: [phone]  Fax: [phone]

## 2020-05-28 NOTE — PROGRESS NOTES
[facility]    CC: Diarrhea    HPI 44 y.o. male with family history of colon cancer presents for evaluation of several weeks of acute, post-prandial diarrhea associated with nocturnal stools and abdominal cramping. He notes his stool has been black as well as red on occasion and that it has a particularly bad smell. He has no sick contacts or recent travel but did take Amoxil last month for a tooth infection. His last EGD was performed in 2017 for epigastric pain and h/o PUD, notable for H.pylori negative gastritis. His last colonoscopy was in 2015 , notable for internal hemorrhoids. He denies weight loss, fevers or chills.    Past Medical History:   Diagnosis Date    Colon polyp     Diverticulosis     Epidermoid cyst of testis     left    H. pylori infection     Hydrocele, left     IBS (irritable bowel syndrome)     Peptic ulcer disease          Review of Systems  General ROS: negative for - chills, fever or weight loss  Cardiovascular ROS: no chest pain or dyspnea on exertion  Gastrointestinal ROS: + diarrhea, + blood in stool, + abdominal cramping    Physical Examination  There were no vitals taken for this visit.  General appearance: alert, cooperative, no distress  HENT: Normocephalic, atraumatic, neck symmetrical, no nasal discharge, sclera anicteric   Lungs: clear to auscultation bilaterally, symmetric chest wall expansion bilaterally  Heart: regular rate and rhythm without rub; no displacement of the PMI   Abdomen: soft, mild ttp across lower abdomen without rebound or guarding, BS active ,no masses   Extremities: extremities symmetric; no clubbing, cyanosis, or edema    Labs:  Lab Results   Component Value Date    WBC 7.60 04/27/2017    HGB 12.8 (L) 04/27/2017    HCT 36.7 (L) 04/27/2017    MCV 89 04/27/2017     04/27/2017           Imaging:  CT abdomen report 2017 was independently visualized and reviewed by me and showed no acute findings    Assessment:   44 y.o. male with  family history of colon cancer presents for evaluation of several weeks of post-prandial, foul smelling diarrhea with prior black and bloody stools that have resolved.   Plan:  -Check stool for infection  -CBC, CMP, CRP, IgA, TTG,   -Begin daily PPI  -Schedule EGD with biopsies for celiac disease and H.pylori, schedule colonoscopy with TI intubation and random biopsies     Sheila Adams MD  [facility]  [address], [address]  [facility] [place] [address]  Office: [phone]  Fax: [phone]

## 2020-05-29 ENCOUNTER — LAB VISIT (OUTPATIENT)
Dept: LAB | Facility: HOSPITAL | Age: 45
End: 2020-05-29
Attending: INTERNAL MEDICINE
Payer: COMMERCIAL

## 2020-05-29 DIAGNOSIS — K52.9 POSTPRANDIAL DIARRHEA: ICD-10-CM

## 2020-05-29 LAB
ALBUMIN SERPL BCP-MCNC: 4.1 G/DL (ref 3.5–5.2)
ALP SERPL-CCNC: 81 U/L (ref 55–135)
ALT SERPL W/O P-5'-P-CCNC: 90 U/L (ref 10–44)
ANION GAP SERPL CALC-SCNC: 9 MMOL/L (ref 8–16)
AST SERPL-CCNC: 46 U/L (ref 10–40)
BASOPHILS NFR BLD: 1 % (ref 0–1.9)
BILIRUB SERPL-MCNC: 0.3 MG/DL (ref 0.1–1)
BUN SERPL-MCNC: 21 MG/DL (ref 6–20)
CALCIUM SERPL-MCNC: 8.7 MG/DL (ref 8.7–10.5)
CHLORIDE SERPL-SCNC: 105 MMOL/L (ref 95–110)
CO2 SERPL-SCNC: 27 MMOL/L (ref 23–29)
CREAT SERPL-MCNC: 1 MG/DL (ref 0.5–1.4)
DIFFERENTIAL METHOD: ABNORMAL
EOSINOPHIL NFR BLD: 1 % (ref 0–8)
ERYTHROCYTE [DISTWIDTH] IN BLOOD BY AUTOMATED COUNT: 12.6 % (ref 11.5–14.5)
EST. GFR  (AFRICAN AMERICAN): >60 ML/MIN/1.73 M^2
EST. GFR  (NON AFRICAN AMERICAN): >60 ML/MIN/1.73 M^2
GLUCOSE SERPL-MCNC: 87 MG/DL (ref 70–110)
HCT VFR BLD AUTO: 37.5 % (ref 40–54)
HGB BLD-MCNC: 13.1 G/DL (ref 14–18)
IMM GRANULOCYTES # BLD AUTO: ABNORMAL K/UL
IMM GRANULOCYTES NFR BLD AUTO: ABNORMAL %
LYMPHOCYTES NFR BLD: 50 % (ref 18–48)
MCH RBC QN AUTO: 30.8 PG (ref 27–31)
MCHC RBC AUTO-ENTMCNC: 34.9 G/DL (ref 32–36)
MCV RBC AUTO: 88 FL (ref 82–98)
MONOCYTES NFR BLD: 5 % (ref 4–15)
NEUTROPHILS NFR BLD: 41 % (ref 38–73)
NEUTS BAND NFR BLD MANUAL: 2 %
NRBC BLD-RTO: 0 /100 WBC
PLATELET # BLD AUTO: 213 K/UL (ref 150–350)
PLATELET BLD QL SMEAR: ABNORMAL
PMV BLD AUTO: 8.9 FL (ref 9.2–12.9)
POTASSIUM SERPL-SCNC: 4 MMOL/L (ref 3.5–5.1)
PROT SERPL-MCNC: 7 G/DL (ref 6–8.4)
RBC # BLD AUTO: 4.26 M/UL (ref 4.6–6.2)
SODIUM SERPL-SCNC: 141 MMOL/L (ref 136–145)
WBC # BLD AUTO: 6.35 K/UL (ref 3.9–12.7)

## 2020-05-29 PROCEDURE — 85007 BL SMEAR W/DIFF WBC COUNT: CPT

## 2020-05-29 PROCEDURE — 36415 COLL VENOUS BLD VENIPUNCTURE: CPT

## 2020-05-29 PROCEDURE — 85027 COMPLETE CBC AUTOMATED: CPT

## 2020-05-29 PROCEDURE — 80053 COMPREHEN METABOLIC PANEL: CPT

## 2020-06-02 ENCOUNTER — LAB VISIT (OUTPATIENT)
Dept: PRIMARY CARE CLINIC | Facility: CLINIC | Age: 45
End: 2020-06-02
Payer: COMMERCIAL

## 2020-06-02 ENCOUNTER — LAB VISIT (OUTPATIENT)
Dept: LAB | Facility: HOSPITAL | Age: 45
End: 2020-06-02
Attending: INTERNAL MEDICINE
Payer: COMMERCIAL

## 2020-06-02 ENCOUNTER — TELEPHONE (OUTPATIENT)
Dept: GASTROENTEROLOGY | Facility: CLINIC | Age: 45
End: 2020-06-02

## 2020-06-02 DIAGNOSIS — K52.9 POSTPRANDIAL DIARRHEA: ICD-10-CM

## 2020-06-02 DIAGNOSIS — Z01.818 PREOP TESTING: ICD-10-CM

## 2020-06-02 PROCEDURE — 83986 ASSAY PH BODY FLUID NOS: CPT

## 2020-06-02 PROCEDURE — 87427 SHIGA-LIKE TOXIN AG IA: CPT | Mod: 59

## 2020-06-02 PROCEDURE — 87045 FECES CULTURE AEROBIC BACT: CPT

## 2020-06-02 PROCEDURE — 83630 LACTOFERRIN FECAL (QUAL): CPT

## 2020-06-02 PROCEDURE — 87209 SMEAR COMPLEX STAIN: CPT

## 2020-06-02 PROCEDURE — 87329 GIARDIA AG IA: CPT

## 2020-06-02 PROCEDURE — U0003 INFECTIOUS AGENT DETECTION BY NUCLEIC ACID (DNA OR RNA); SEVERE ACUTE RESPIRATORY SYNDROME CORONAVIRUS 2 (SARS-COV-2) (CORONAVIRUS DISEASE [COVID-19]), AMPLIFIED PROBE TECHNIQUE, MAKING USE OF HIGH THROUGHPUT TECHNOLOGIES AS DESCRIBED BY CMS-2020-01-R: HCPCS

## 2020-06-02 PROCEDURE — 87046 STOOL CULTR AEROBIC BACT EA: CPT | Mod: 59

## 2020-06-03 LAB
CRYPTOSP AG STL QL IA: NEGATIVE
E COLI SXT1 STL QL IA: NEGATIVE
E COLI SXT2 STL QL IA: NEGATIVE
G LAMBLIA AG STL QL IA: NEGATIVE
SARS-COV-2 RNA RESP QL NAA+PROBE: NOT DETECTED

## 2020-06-04 ENCOUNTER — HOSPITAL ENCOUNTER (OUTPATIENT)
Facility: HOSPITAL | Age: 45
Discharge: HOME | End: 2020-06-04
Attending: INTERNAL MEDICINE | Admitting: INTERNAL MEDICINE
Payer: COMMERCIAL

## 2020-06-04 ENCOUNTER — ANESTHESIA (OUTPATIENT)
Dept: ENDOSCOPY | Facility: HOSPITAL | Age: 45
End: 2020-06-04
Payer: COMMERCIAL

## 2020-06-04 ENCOUNTER — ANESTHESIA EVENT (OUTPATIENT)
Dept: ENDOSCOPY | Facility: HOSPITAL | Age: 45
End: 2020-06-04
Payer: COMMERCIAL

## 2020-06-04 DIAGNOSIS — R19.7 DIARRHEA: ICD-10-CM

## 2020-06-04 DIAGNOSIS — K29.70 GASTRITIS DETERMINED BY ENDOSCOPY: Primary | ICD-10-CM

## 2020-06-04 LAB
LACTOFERRIN, STOOL: NEGATIVE
O+P STL MICRO: NORMAL

## 2020-06-04 PROCEDURE — 45380 COLONOSCOPY AND BIOPSY: CPT | Performed by: INTERNAL MEDICINE

## 2020-06-04 PROCEDURE — 88342 IMHCHEM/IMCYTCHM 1ST ANTB: CPT | Mod: 26,,, | Performed by: PATHOLOGY

## 2020-06-04 PROCEDURE — 45380 PR COLONOSCOPY,BIOPSY: ICD-10-PCS | Mod: ,,, | Performed by: INTERNAL MEDICINE

## 2020-06-04 PROCEDURE — 27201012 HC FORCEPS, HOT/COLD, DISP: Performed by: INTERNAL MEDICINE

## 2020-06-04 PROCEDURE — 45380 COLONOSCOPY AND BIOPSY: CPT | Mod: ,,, | Performed by: INTERNAL MEDICINE

## 2020-06-04 PROCEDURE — D9220A PRA ANESTHESIA: Mod: CRNA,,, | Performed by: NURSE ANESTHETIST, CERTIFIED REGISTERED

## 2020-06-04 PROCEDURE — D9220A PRA ANESTHESIA: ICD-10-PCS | Mod: CRNA,,, | Performed by: NURSE ANESTHETIST, CERTIFIED REGISTERED

## 2020-06-04 PROCEDURE — 43239 EGD BIOPSY SINGLE/MULTIPLE: CPT | Performed by: INTERNAL MEDICINE

## 2020-06-04 PROCEDURE — 25000003 PHARM REV CODE 250: Performed by: INTERNAL MEDICINE

## 2020-06-04 PROCEDURE — 43239 PR EGD, FLEX, W/BIOPSY, SGL/MULTI: ICD-10-PCS | Mod: 51,,, | Performed by: INTERNAL MEDICINE

## 2020-06-04 PROCEDURE — 63600175 PHARM REV CODE 636 W HCPCS: Performed by: NURSE ANESTHETIST, CERTIFIED REGISTERED

## 2020-06-04 PROCEDURE — 25000003 PHARM REV CODE 250: Performed by: NURSE ANESTHETIST, CERTIFIED REGISTERED

## 2020-06-04 PROCEDURE — 43239 EGD BIOPSY SINGLE/MULTIPLE: CPT | Mod: 51,,, | Performed by: INTERNAL MEDICINE

## 2020-06-04 PROCEDURE — D9220A PRA ANESTHESIA: Mod: ANES,,, | Performed by: ANESTHESIOLOGY

## 2020-06-04 PROCEDURE — 37000008 HC ANESTHESIA 1ST 15 MINUTES: Performed by: INTERNAL MEDICINE

## 2020-06-04 PROCEDURE — 88305 TISSUE EXAM BY PATHOLOGIST: CPT | Mod: 26,,, | Performed by: PATHOLOGY

## 2020-06-04 PROCEDURE — 88305 TISSUE EXAM BY PATHOLOGIST: ICD-10-PCS | Mod: 26,,, | Performed by: PATHOLOGY

## 2020-06-04 PROCEDURE — D9220A PRA ANESTHESIA: ICD-10-PCS | Mod: ANES,,, | Performed by: ANESTHESIOLOGY

## 2020-06-04 PROCEDURE — 88342 IMHCHEM/IMCYTCHM 1ST ANTB: CPT | Performed by: PATHOLOGY

## 2020-06-04 PROCEDURE — 88342 CHG IMMUNOCYTOCHEMISTRY: ICD-10-PCS | Mod: 26,,, | Performed by: PATHOLOGY

## 2020-06-04 PROCEDURE — 37000009 HC ANESTHESIA EA ADD 15 MINS: Performed by: INTERNAL MEDICINE

## 2020-06-04 PROCEDURE — 88305 TISSUE EXAM BY PATHOLOGIST: CPT | Performed by: PATHOLOGY

## 2020-06-04 RX ORDER — GLYCOPYRROLATE 0.2 MG/ML
INJECTION INTRAMUSCULAR; INTRAVENOUS
Status: DISCONTINUED | OUTPATIENT
Start: 2020-06-04 | End: 2020-06-04

## 2020-06-04 RX ORDER — SODIUM CHLORIDE 9 MG/ML
INJECTION, SOLUTION INTRAVENOUS CONTINUOUS
Status: DISCONTINUED | OUTPATIENT
Start: 2020-06-04 | End: 2020-06-04 | Stop reason: HOSPADM

## 2020-06-04 RX ORDER — LIDOCAINE HCL/PF 100 MG/5ML
SYRINGE (ML) INTRAVENOUS
Status: DISCONTINUED | OUTPATIENT
Start: 2020-06-04 | End: 2020-06-04

## 2020-06-04 RX ORDER — PROPOFOL 10 MG/ML
VIAL (ML) INTRAVENOUS
Status: DISCONTINUED | OUTPATIENT
Start: 2020-06-04 | End: 2020-06-04

## 2020-06-04 RX ADMIN — PROPOFOL 50 MG: 10 INJECTION, EMULSION INTRAVENOUS at 10:06

## 2020-06-04 RX ADMIN — PROPOFOL 150 MG: 10 INJECTION, EMULSION INTRAVENOUS at 10:06

## 2020-06-04 RX ADMIN — PROPOFOL 50 MG: 10 INJECTION, EMULSION INTRAVENOUS at 11:06

## 2020-06-04 RX ADMIN — SODIUM CHLORIDE: 0.9 INJECTION, SOLUTION INTRAVENOUS at 10:06

## 2020-06-04 RX ADMIN — LIDOCAINE HYDROCHLORIDE 100 MG: 20 INJECTION INTRAVENOUS at 10:06

## 2020-06-04 RX ADMIN — GLYCOPYRROLATE 0.2 MG: 0.2 INJECTION, SOLUTION INTRAMUSCULAR; INTRAVENOUS at 10:06

## 2020-06-04 NOTE — TRANSFER OF CARE
"Anesthesia Transfer of Care Note    Patient: Rios Buck    Procedure(s) Performed: Procedure(s) (LRB):  EGD (ESOPHAGOGASTRODUODENOSCOPY) (N/A)  COLONOSCOPY (N/A)    Patient location: GI    Anesthesia Type: general    Transport from OR: Transported from OR on room air with adequate spontaneous ventilation    Post pain: adequate analgesia    Post assessment: no apparent anesthetic complications    Post vital signs: stable    Level of consciousness: awake    Nausea/Vomiting: no nausea/vomiting    Complications: none    Transfer of care protocol was followed      Last vitals:   Visit Vitals  BP (!) 85/52   Pulse 79   Temp 36.9 °C (98.4 °F) (Skin)   Resp 14   Ht 5' 7" (1.702 m)   Wt 63.5 kg (140 lb)   SpO2 99%   BMI 21.93 kg/m²     "

## 2020-06-04 NOTE — [employer] PATIENT INSTRUCTIONS
Discharge Summary/Instructions after an Endoscopic Procedure  Patient Name: Rios Buck  Patient MRN: [id number]  Patient YOB: 1975 Thursday, June 4, 2020  Sheila Adams MD  RESTRICTIONS:  During your procedure today, you received medications for sedation.  These   medications may affect your judgment, balance and coordination.  Therefore,   for 24 hours, you have the following restrictions:   - DO NOT drive a car, operate machinery, make legal/financial decisions,   sign important papers or drink alcohol.    ACTIVITY:  Today: no heavy lifting, straining or running due to procedural   sedation/anesthesia.  The following day: return to full activity including work.  DIET:  Eat and drink normally unless instructed otherwise.     TREATMENT FOR COMMON SIDE EFFECTS:  - Mild abdominal pain, nausea, belching, bloating or excessive gas:  rest,   eat lightly and use a heating pad.  - Sore Throat: treat with throat lozenges and/or gargle with warm salt   water.  - Because air was used during the procedure, expelling large amounts of air   from your rectum or belching is normal.  - If a bowel prep was taken, you may not have a bowel movement for 1-3 days.    This is normal.  SYMPTOMS TO WATCH FOR AND REPORT TO YOUR PHYSICIAN:  1. Abdominal pain or bloating, other than gas cramps.  2. Chest pain.  3. Back pain.  4. Signs of infection such as: chills or fever occurring within 24 hours   after the procedure.  5. Rectal bleeding, which would show as bright red, maroon, or black stools.   (A tablespoon of blood from the rectum is not serious, especially if   hemorrhoids are present.)  6. Vomiting.  7. Weakness or dizziness.  GO DIRECTLY TO THE NEAREST EMERGENCY ROOM IF YOU HAVE ANY OF THE FOLLOWING:      Difficulty breathing              Chills and/or fever over 101 F   Persistent vomiting and/or vomiting blood   Severe abdominal pain   Severe chest pain   Black, tarry stools   Bleeding- more than one  tablespoon   Any other symptom or condition that you feel may need urgent attention  Your doctor recommends these additional instructions:  If any biopsies were taken, your doctors clinic will contact you in 1 to 2   weeks with any results.  - Discharge patient to home (with escort).   - Patient has a contact number available for emergencies.  The signs and   symptoms of potential delayed complications were discussed with the   patient.  Return to normal activities tomorrow.  Written discharge   instructions were provided to the patient.   - Resume previous diet.   - Continue present medications.   - Await pathology results.   - Repeat colonoscopy in 5 years for screening purposes.   - Return to my office in 2 months.   -If biopsies unremarkable and diarrhea continues will place on trial of   Cholestyramine  For questions, problems or results please call your physician - Sheila Adams MD at Work:  [phone].  [facility], EMERGENCY ROOM PHONE NUMBER: [phone]  IF A COMPLICATION OR EMERGENCY SITUATION ARISES AND YOU ARE UNABLE TO REACH   YOUR PHYSICIAN - GO DIRECTLY TO THE EMERGENCY ROOM.  Sheila Adams MD  6/4/2020 11:19:18 AM  This report has been verified and signed electronically.  [employer]

## 2020-06-04 NOTE — ANESTHESIA POSTPROCEDURE EVALUATION
Anesthesia Post Evaluation    Patient: Rios Buck    Procedure(s) Performed: Procedure(s) (LRB):  EGD (ESOPHAGOGASTRODUODENOSCOPY) (N/A)  COLONOSCOPY (N/A)    Final Anesthesia Type: general    Patient location during evaluation: PACU  Patient participation: Yes- Able to Participate  Level of consciousness: awake and alert and oriented  Post-procedure vital signs: reviewed and stable  Pain management: adequate  Airway patency: patent    PONV status at discharge: No PONV  Anesthetic complications: no      Cardiovascular status: blood pressure returned to baseline  Respiratory status: unassisted, spontaneous ventilation and room air  Hydration status: euvolemic  Follow-up not needed.          Vitals Value Taken Time   BP 85/52 6/4/2020 11:14 AM   Temp 36.9 °C (98.4 °F) 6/4/2020  9:58 AM   Pulse 79 6/4/2020 11:14 AM   Resp 14 6/4/2020 11:14 AM   SpO2 99 % 6/4/2020 11:14 AM         No case tracking events are documented in the log.      Pain/Thony Score: No data recorded

## 2020-06-04 NOTE — PLAN OF CARE
Pt denies dizziness, nausea, pain. Passed flatus. Wife will help him schedule ultrasound and labs. Awaiting pathology results. Discharge instructions given including taking prescription.

## 2020-06-04 NOTE — [employer] PATIENT INSTRUCTIONS
Discharge Summary/Instructions after an Endoscopic Procedure  Patient Name: Rios Buck  Patient MRN: [id number]  Patient YOB: 1975 Thursday, June 4, 2020  Sheila Adams MD  RESTRICTIONS:  During your procedure today, you received medications for sedation.  These   medications may affect your judgment, balance and coordination.  Therefore,   for 24 hours, you have the following restrictions:   - DO NOT drive a car, operate machinery, make legal/financial decisions,   sign important papers or drink alcohol.    ACTIVITY:  Today: no heavy lifting, straining or running due to procedural   sedation/anesthesia.  The following day: return to full activity including work.  DIET:  Eat and drink normally unless instructed otherwise.     TREATMENT FOR COMMON SIDE EFFECTS:  - Mild abdominal pain, nausea, belching, bloating or excessive gas:  rest,   eat lightly and use a heating pad.  - Sore Throat: treat with throat lozenges and/or gargle with warm salt   water.  - Because air was used during the procedure, expelling large amounts of air   from your rectum or belching is normal.  - If a bowel prep was taken, you may not have a bowel movement for 1-3 days.    This is normal.  SYMPTOMS TO WATCH FOR AND REPORT TO YOUR PHYSICIAN:  1. Abdominal pain or bloating, other than gas cramps.  2. Chest pain.  3. Back pain.  4. Signs of infection such as: chills or fever occurring within 24 hours   after the procedure.  5. Rectal bleeding, which would show as bright red, maroon, or black stools.   (A tablespoon of blood from the rectum is not serious, especially if   hemorrhoids are present.)  6. Vomiting.  7. Weakness or dizziness.  GO DIRECTLY TO THE NEAREST EMERGENCY ROOM IF YOU HAVE ANY OF THE FOLLOWING:      Difficulty breathing              Chills and/or fever over 101 F   Persistent vomiting and/or vomiting blood   Severe abdominal pain   Severe chest pain   Black, tarry stools   Bleeding- more than one  tablespoon   Any other symptom or condition that you feel may need urgent attention  Your doctor recommends these additional instructions:  If any biopsies were taken, your doctors clinic will contact you in 1 to 2   weeks with any results.  - Await pathology results.   - Discharge patient to home (with escort).   - Patient has a contact number available for emergencies.  The signs and   symptoms of potential delayed complications were discussed with the   patient.  Return to normal activities tomorrow.  Written discharge   instructions were provided to the patient.   - Resume previous diet.   - Begin daily PPI   -Avoid NSAIDs  For questions, problems or results please call your physician - Sheila Adams MD at Work:  [phone].  [facility], EMERGENCY ROOM PHONE NUMBER: [phone]  IF A COMPLICATION OR EMERGENCY SITUATION ARISES AND YOU ARE UNABLE TO REACH   YOUR PHYSICIAN - GO DIRECTLY TO THE EMERGENCY ROOM.  Sheila Adams MD  6/4/2020 11:16:09 AM  This report has been verified and signed electronically.  [employer]

## 2020-06-04 NOTE — ANESTHESIA PREPROCEDURE EVALUATION
06/04/2020  Rios Buck is a 44 y.o., male.    Anesthesia Evaluation    I have reviewed the Patient Summary Reports.    I have reviewed the Nursing Notes. I have reviewed the NPO Status.      Review of Systems  Anesthesia Hx:  No problems with previous Anesthesia Denies Hx of Anesthetic complications    Social:  Non-Smoker    Cardiovascular:   Denies Hypertension.  Denies MI.  Denies CAD.    Denies CABG/stent.   Denies Angina.    Pulmonary:   Denies COPD.  Denies Asthma.  Denies Recent URI.    Renal/:   Denies Chronic Renal Disease.     Hepatic/GI:   PUD, Denies GERD. Denies Liver Disease.    Neurological:   Denies TIA. Denies CVA. Denies Seizures.    Endocrine:   Denies Diabetes. Denies Hypothyroidism.    Psych:   Denies Psychiatric History. anxiety          Physical Exam  General:  Well nourished    Airway/Jaw/Neck:  Airway Findings: Mouth Opening: Normal Tongue: Normal  General Airway Assessment: Adult, Good  Mallampati: II  Improves to II with phonation.  TM Distance: 4-6 cm      Dental:  Dental Findings: In tact   Chest/Lungs:  Chest/Lungs Findings: Clear to auscultation, Normal Respiratory Rate     Heart/Vascular:  Heart Findings: Rate: Normal  Rhythm: Regular Rhythm  Sounds: Normal  Heart murmur: negative       Mental Status:  Mental Status Findings:  Cooperative, Alert and Oriented         Anesthesia Plan  Type of Anesthesia, risks & benefits discussed:  Anesthesia Type:  general  Patient's Preference:   Intra-op Monitoring Plan: standard ASA monitors  Intra-op Monitoring Plan Comments:   Post Op Pain Control Plan:   Post Op Pain Control Plan Comments:   Induction:   IV  Beta Blocker:  Patient is not currently on a Beta-Blocker (No further documentation required).       Informed Consent: Patient understands risks and agrees with Anesthesia plan.  Questions answered. Anesthesia consent  signed with patient.  ASA Score: 1     Day of Surgery Review of History & Physical: I have interviewed and examined the patient. I have reviewed the patient's H&P dated:  There are no significant changes.  H&P update referred to the surgeon.  H&P completed by Anesthesiologist.       Ready For Surgery From Anesthesia Perspective.

## 2020-06-05 VITALS
HEIGHT: 67 IN | BODY MASS INDEX: 21.97 KG/M2 | RESPIRATION RATE: 18 BRPM | SYSTOLIC BLOOD PRESSURE: 100 MMHG | DIASTOLIC BLOOD PRESSURE: 55 MMHG | OXYGEN SATURATION: 99 % | TEMPERATURE: 97 F | HEART RATE: 59 BPM | WEIGHT: 140 LBS

## 2020-06-06 LAB
BACTERIA STL CULT: NORMAL
PH STL: NORMAL [PH]

## 2020-06-09 LAB
FINAL PATHOLOGIC DIAGNOSIS: NORMAL
GROSS: NORMAL

## 2020-06-10 ENCOUNTER — HOSPITAL ENCOUNTER (OUTPATIENT)
Dept: RADIOLOGY | Facility: HOSPITAL | Age: 45
Discharge: HOME | End: 2020-06-10
Attending: INTERNAL MEDICINE
Payer: COMMERCIAL

## 2020-06-10 ENCOUNTER — LAB VISIT (OUTPATIENT)
Dept: LAB | Facility: HOSPITAL | Age: 45
End: 2020-06-10
Attending: INTERNAL MEDICINE
Payer: COMMERCIAL

## 2020-06-10 DIAGNOSIS — R74.01 TRANSAMINITIS: Primary | ICD-10-CM

## 2020-06-10 DIAGNOSIS — R19.7 DIARRHEA: ICD-10-CM

## 2020-06-10 DIAGNOSIS — K52.9 POSTPRANDIAL DIARRHEA: ICD-10-CM

## 2020-06-10 LAB
ALBUMIN SERPL BCP-MCNC: 4.4 G/DL (ref 3.5–5.2)
ALP SERPL-CCNC: 77 U/L (ref 55–135)
ALT SERPL W/O P-5'-P-CCNC: 108 U/L (ref 10–44)
ANION GAP SERPL CALC-SCNC: 5 MMOL/L (ref 8–16)
AST SERPL-CCNC: 66 U/L (ref 10–40)
BILIRUB SERPL-MCNC: 0.7 MG/DL (ref 0.1–1)
BUN SERPL-MCNC: 19 MG/DL (ref 6–20)
CALCIUM SERPL-MCNC: 9.1 MG/DL (ref 8.7–10.5)
CHLORIDE SERPL-SCNC: 104 MMOL/L (ref 95–110)
CO2 SERPL-SCNC: 32 MMOL/L (ref 23–29)
CREAT SERPL-MCNC: 0.9 MG/DL (ref 0.5–1.4)
CRP SERPL-MCNC: 1 MG/L (ref 0–8.2)
EST. GFR  (AFRICAN AMERICAN): >60 ML/MIN/1.73 M^2
EST. GFR  (NON AFRICAN AMERICAN): >60 ML/MIN/1.73 M^2
GLUCOSE SERPL-MCNC: 91 MG/DL (ref 70–110)
IGA SERPL-MCNC: 123 MG/DL (ref 40–350)
POTASSIUM SERPL-SCNC: 3.9 MMOL/L (ref 3.5–5.1)
PROT SERPL-MCNC: 7.4 G/DL (ref 6–8.4)
SODIUM SERPL-SCNC: 141 MMOL/L (ref 136–145)

## 2020-06-10 PROCEDURE — 76705 ECHO EXAM OF ABDOMEN: CPT | Mod: TC

## 2020-06-10 PROCEDURE — 76705 ECHO EXAM OF ABDOMEN: CPT | Mod: 26,,, | Performed by: RADIOLOGY

## 2020-06-10 PROCEDURE — 76705 US ABDOMEN LIMITED: ICD-10-PCS | Mod: 26,,, | Performed by: RADIOLOGY

## 2020-06-10 PROCEDURE — 82784 ASSAY IGA/IGD/IGG/IGM EACH: CPT

## 2020-06-10 PROCEDURE — 86140 C-REACTIVE PROTEIN: CPT

## 2020-06-10 PROCEDURE — 80053 COMPREHEN METABOLIC PANEL: CPT

## 2020-06-10 PROCEDURE — 83516 IMMUNOASSAY NONANTIBODY: CPT

## 2020-06-12 ENCOUNTER — TELEPHONE (OUTPATIENT)
Dept: GASTROENTEROLOGY | Facility: CLINIC | Age: 45
End: 2020-06-12

## 2020-06-12 LAB — TTG IGA SER-ACNC: 3 UNITS

## 2020-06-12 NOTE — TELEPHONE ENCOUNTER
Call placed to Ms. Amberly in regards to message received. She stated that she wanted to let Dr. Adams know that on 6/8 Mr. Buck worked out at the gym for 3-4 hours, and was not sure if that could have had an effect on his labs. She also wanted to let her know that he has not had any issues since his colonoscopy. Diarrhea and blood are gone. She also stated that he quit drinking 2-3 yrs ago, and he has not had any supplements or energy drinks since his colonoscopy. I advised her I would send a message to Dr. Adams. She verbalized understanding. No further issues noted.

## 2020-06-12 NOTE — TELEPHONE ENCOUNTER
----- Message from Ana Earl sent at 6/12/2020 12:07 PM CDT -----  Contact: Amberly terry spouse  Type:  Patient Returning Call    Who Called:  Nick uBck  Does the patient know what this is regarding?:  Please follow up with pt  Best Call Back Number:    Additional Information:  Thank you

## 2020-06-17 ENCOUNTER — TELEPHONE (OUTPATIENT)
Dept: GASTROENTEROLOGY | Facility: CLINIC | Age: 45
End: 2020-06-17

## 2020-06-17 NOTE — TELEPHONE ENCOUNTER
Returned call to Ms. Buck. I advised her I have not heard back from  Dr. Adams yet, but as soon as I hear I will call her. She verbalized understanding. No further issues noted.

## 2020-06-17 NOTE — TELEPHONE ENCOUNTER
----- Message from Waqar Garcia sent at 6/17/2020  4:43 PM CDT -----  Type: Needs Medical Advice    Who Called:  Patient  Best Call Back Number: [phone]  Additional Information: Patient would like to check status of previous messages. Please call to advise. Thanks!

## 2020-06-29 DIAGNOSIS — M25.511 RIGHT SHOULDER PAIN, UNSPECIFIED CHRONICITY: Primary | ICD-10-CM

## 2020-06-30 ENCOUNTER — OFFICE VISIT (OUTPATIENT)
Dept: ORTHOPEDICS | Facility: CLINIC | Age: 45
End: 2020-06-30
Payer: COMMERCIAL

## 2020-06-30 ENCOUNTER — HOSPITAL ENCOUNTER (OUTPATIENT)
Dept: RADIOLOGY | Facility: HOSPITAL | Age: 45
Discharge: HOME | End: 2020-06-30
Attending: ORTHOPAEDIC SURGERY
Payer: COMMERCIAL

## 2020-06-30 VITALS — TEMPERATURE: 99 F | BODY MASS INDEX: 21.97 KG/M2 | WEIGHT: 140 LBS | HEIGHT: 67 IN

## 2020-06-30 DIAGNOSIS — M25.511 RIGHT SHOULDER PAIN, UNSPECIFIED CHRONICITY: ICD-10-CM

## 2020-06-30 DIAGNOSIS — M25.511 RIGHT SHOULDER PAIN, UNSPECIFIED CHRONICITY: Primary | ICD-10-CM

## 2020-06-30 PROCEDURE — 99999 PR PBB SHADOW E&M-EST. PATIENT-LVL III: ICD-10-PCS | Mod: PBBFAC,,, | Performed by: ORTHOPAEDIC SURGERY

## 2020-06-30 PROCEDURE — 73030 X-RAY EXAM OF SHOULDER: CPT | Mod: TC,PN,RT

## 2020-06-30 PROCEDURE — 99999 PR PBB SHADOW E&M-EST. PATIENT-LVL III: CPT | Mod: PBBFAC,,, | Performed by: ORTHOPAEDIC SURGERY

## 2020-06-30 PROCEDURE — 73030 X-RAY EXAM OF SHOULDER: CPT | Mod: 26,RT,, | Performed by: RADIOLOGY

## 2020-06-30 PROCEDURE — 3008F PR BODY MASS INDEX (BMI) DOCUMENTED: ICD-10-PCS | Mod: CPTII,S$GLB,, | Performed by: ORTHOPAEDIC SURGERY

## 2020-06-30 PROCEDURE — 99203 PR OFFICE/OUTPT VISIT, NEW, LEVL III, 30-44 MIN: ICD-10-PCS | Mod: S$GLB,,, | Performed by: ORTHOPAEDIC SURGERY

## 2020-06-30 PROCEDURE — 73030 XR SHOULDER COMPLETE 2 OR MORE VIEWS RIGHT: ICD-10-PCS | Mod: 26,RT,, | Performed by: RADIOLOGY

## 2020-06-30 PROCEDURE — 99203 OFFICE O/P NEW LOW 30 MIN: CPT | Mod: S$GLB,,, | Performed by: ORTHOPAEDIC SURGERY

## 2020-06-30 PROCEDURE — 3008F BODY MASS INDEX DOCD: CPT | Mod: CPTII,S$GLB,, | Performed by: ORTHOPAEDIC SURGERY

## 2020-06-30 NOTE — PROGRESS NOTES
6/30/2020      Past Medical History:   Diagnosis Date    Colon polyp     Diverticulosis     Epidermoid cyst of testis     left    H. pylori infection     Hydrocele, left     IBS (irritable bowel syndrome)     Peptic ulcer disease        Past Surgical History:   Procedure Laterality Date    ADENOIDECTOMY      COLONOSCOPY  06/2015    repeat in 5 years for surveillance    COLONOSCOPY N/A 6/4/2020    Procedure: COLONOSCOPY;  Surgeon: Sheila Adams MD;  Location: [facility] ENDO;  Service: Endoscopy;  Laterality: N/A;    ESOPHAGOGASTRODUODENOSCOPY N/A 6/4/2020    Procedure: EGD (ESOPHAGOGASTRODUODENOSCOPY);  Surgeon: Sheila Adams MD;  Location: [facility] ENDO;  Service: Endoscopy;  Laterality: N/A;    KNEE ARTHROSCOPY W/ DEBRIDEMENT  2012    right knee     TONSILLECTOMY      UPPER GASTROINTESTINAL ENDOSCOPY  05/2015    repeat in 8 weeks to reassess ulcer for healing         Current Outpatient Medications:     CYANOCOBALAMIN, VITAMIN B-12, (VITAMIN B-12 ORAL), Take 1 each by mouth once daily., Disp: , Rfl:     escitalopram oxalate (LEXAPRO) 10 MG tablet, Take 5 mg (0.5 tab) daily x 1 week and then 10 mg (1 tab) daily thereafter., Disp: 30 tablet, Rfl: 11    fluticasone (FLONASE) 50 mcg/actuation nasal spray, 1 spray (50 mcg total) by Each Nare route once daily., Disp: 1 Bottle, Rfl: 0    lubiprostone (AMITIZA) 8 MCG Cap, Take 1 capsule (8 mcg total) by mouth 2 (two) times daily with meals., Disp: 60 capsule, Rfl: 2    pantoprazole (PROTONIX) 40 MG tablet, Take 1 tablet (40 mg total) by mouth once daily., Disp: 30 tablet, Rfl: 3    sumatriptan (IMITREX) 50 MG tablet, Take 1 at onset of severe headache.  If needed may take 1 more pill (to total 2 pills) two hours later.  Do not exceed 2 pills in 24 hours., Disp: 15 tablet, Rfl: 5    Allergies as of 06/30/2020 - Reviewed 06/30/2020   Allergen Reaction Noted    Doxycycline  10/02/2017       Family History   Problem Relation Age of Onset    Diabetes  Mother     Heart disease Mother 50        CABG x3    Cancer Maternal Grandfather 64        colon cancer    Colon cancer Maternal Grandfather 65    Crohn's disease Neg Hx     Ulcerative colitis Neg Hx     Celiac disease Neg Hx        Social History     Socioeconomic History    Marital status:      Spouse name: Not on file    Number of children: Not on file    Years of education: Not on file    Highest education level: Not on file   Occupational History    Not on file   Social Needs    Financial resource strain: Not on file    Food insecurity     Worry: Not on file     Inability: Not on file    Transportation needs     Medical: Not on file     Non-medical: Not on file   Tobacco Use    Smoking status: Never Smoker    Smokeless tobacco: Never Used   Substance and Sexual Activity    Alcohol use: No    Drug use: No    Sexual activity: Not on file   Lifestyle    Physical activity     Days per week: Not on file     Minutes per session: Not on file    Stress: Not on file   Relationships    Social connections     Talks on phone: Not on file     Gets together: Not on file     Attends Synagogue service: Not on file     Active member of club or organization: Not on file     Attends meetings of clubs or organizations: Not on file     Relationship status: Not on file   Other Topics Concern    Not on file   Social History Narrative    Not on file             HPI:  Patient has a history of chronic right shoulder pain denies any history of any definite injury but has been having he has for years he does a lot of weightlifting      Review of Systems   Constitution: Negative for chills and fever.   HENT: Negative for headaches and blurry vision.   Cardiovascular: Negative for chest pain, irregular heartbeat, leg swelling and palpitations.   Respiratory: Negative for cough and shortness of breath.   Endocrine: Negative for polyuria.   Hematologic/Lymphatic: Negative for bleeding problem. Does not  bruise/bleed easily.   Skin: Negative for poor wound healing and rash.   Musculoskeletal: Negative for joint pain. Negative for arthritis, joint swelling, muscle weakness and myalgias.   Gastrointestinal: Negative for abdominal pain, heartburn, melena, nausea and vomiting.   Genitourinary: Negative for bladder incontinence and dysuria.   Neurological: Negative for numbness.   Psychiatric/Behavioral: Negative for depression. The patient is not nervous/anxious.     PE:  [unfilled]    A&Ox3, NAD  Neck-supple with good range of motion and no pain  RUE examination of the right shoulder has full range of motion he has a strong rotator cuff there is no crepitance his pain appears to be in the subacromial area and also near the coracoid process he has a strong rotator cuff he has some discomfort over the bicipital groove area is no instability  LUE no swelling pain and this or deformity  Pelvis no hip pain or back pain  Back no back pain straight leg raise negative  RLE no swelling or deformity  LLE no swelling or deformity    Xrays:  X-ray of the right shoulder was negative        Labs:    No results found for this or any previous visit (from the past 24 hour(s)).    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

## 2020-07-10 ENCOUNTER — HOSPITAL ENCOUNTER (OUTPATIENT)
Dept: RADIOLOGY | Facility: HOSPITAL | Age: 45
Discharge: HOME | End: 2020-07-10
Attending: ORTHOPAEDIC SURGERY
Payer: COMMERCIAL

## 2020-07-10 DIAGNOSIS — M25.511 RIGHT SHOULDER PAIN, UNSPECIFIED CHRONICITY: ICD-10-CM

## 2020-07-10 PROCEDURE — 73221 MRI JOINT UPR EXTREM W/O DYE: CPT | Mod: TC,PO,RT

## 2020-07-14 ENCOUNTER — OFFICE VISIT (OUTPATIENT)
Dept: ORTHOPEDICS | Facility: CLINIC | Age: 45
End: 2020-07-14
Payer: COMMERCIAL

## 2020-07-14 VITALS — TEMPERATURE: 98 F | WEIGHT: 140 LBS | BODY MASS INDEX: 21.97 KG/M2 | HEIGHT: 67 IN

## 2020-07-14 DIAGNOSIS — M19.011 ARTHRITIS OF RIGHT ACROMIOCLAVICULAR JOINT: Primary | ICD-10-CM

## 2020-07-14 PROCEDURE — 99213 PR OFFICE/OUTPT VISIT, EST, LEVL III, 20-29 MIN: ICD-10-PCS | Mod: 25,S$GLB,, | Performed by: ORTHOPAEDIC SURGERY

## 2020-07-14 PROCEDURE — 20610 LARGE JOINT ASPIRATION/INJECTION: R SUBACROMIAL BURSA: ICD-10-PCS | Mod: RT,S$GLB,, | Performed by: ORTHOPAEDIC SURGERY

## 2020-07-14 PROCEDURE — 99999 PR PBB SHADOW E&M-EST. PATIENT-LVL III: CPT | Mod: PBBFAC,,, | Performed by: ORTHOPAEDIC SURGERY

## 2020-07-14 PROCEDURE — 99999 PR PBB SHADOW E&M-EST. PATIENT-LVL III: ICD-10-PCS | Mod: PBBFAC,,, | Performed by: ORTHOPAEDIC SURGERY

## 2020-07-14 PROCEDURE — 20610 DRAIN/INJ JOINT/BURSA W/O US: CPT | Mod: RT,S$GLB,, | Performed by: ORTHOPAEDIC SURGERY

## 2020-07-14 PROCEDURE — 99213 OFFICE O/P EST LOW 20 MIN: CPT | Mod: 25,S$GLB,, | Performed by: ORTHOPAEDIC SURGERY

## 2020-07-14 PROCEDURE — 3008F PR BODY MASS INDEX (BMI) DOCUMENTED: ICD-10-PCS | Mod: CPTII,S$GLB,, | Performed by: ORTHOPAEDIC SURGERY

## 2020-07-14 PROCEDURE — 3008F BODY MASS INDEX DOCD: CPT | Mod: CPTII,S$GLB,, | Performed by: ORTHOPAEDIC SURGERY

## 2020-07-14 RX ORDER — TRIAMCINOLONE ACETONIDE 40 MG/ML
40 INJECTION, SUSPENSION INTRA-ARTICULAR; INTRAMUSCULAR
Status: DISCONTINUED | OUTPATIENT
Start: 2020-07-14 | End: 2020-07-14 | Stop reason: HOSPADM

## 2020-07-14 RX ADMIN — TRIAMCINOLONE ACETONIDE 40 MG: 40 INJECTION, SUSPENSION INTRA-ARTICULAR; INTRAMUSCULAR at 03:07

## 2020-07-14 NOTE — PROCEDURES
Large Joint Aspiration/Injection: R subacromial bursa    Date/Time: 7/14/2020 3:15 PM  Performed by: Gagan Astorga MD  Authorized by: Gagan Astorga MD     Indications:  Pain  Local anesthetic:  Lidocaine 1% without epinephrine  Approach:  Lateral  Location:  Shoulder  Site:  R subacromial bursa  Medications:  40 mg triamcinolone acetonide 40 mg/mL

## 2020-07-14 NOTE — PROGRESS NOTES
7/14/2020    Past Medical History:   Diagnosis Date    Colon polyp     Diverticulosis     Epidermoid cyst of testis     left    H. pylori infection     Hydrocele, left     IBS (irritable bowel syndrome)     Peptic ulcer disease        Past Surgical History:   Procedure Laterality Date    ADENOIDECTOMY      COLONOSCOPY  06/2015    repeat in 5 years for surveillance    COLONOSCOPY N/A 6/4/2020    Procedure: COLONOSCOPY;  Surgeon: Sheila Adams MD;  Location: [facility] ENDO;  Service: Endoscopy;  Laterality: N/A;    ESOPHAGOGASTRODUODENOSCOPY N/A 6/4/2020    Procedure: EGD (ESOPHAGOGASTRODUODENOSCOPY);  Surgeon: Sheila Adams MD;  Location: [facility] ENDO;  Service: Endoscopy;  Laterality: N/A;    KNEE ARTHROSCOPY W/ DEBRIDEMENT  2012    right knee     TONSILLECTOMY      UPPER GASTROINTESTINAL ENDOSCOPY  05/2015    repeat in 8 weeks to reassess ulcer for healing       Current Outpatient Medications   Medication Sig    pantoprazole (PROTONIX) 40 MG tablet Take 1 tablet (40 mg total) by mouth once daily.    CYANOCOBALAMIN, VITAMIN B-12, (VITAMIN B-12 ORAL) Take 1 each by mouth once daily.    escitalopram oxalate (LEXAPRO) 10 MG tablet Take 5 mg (0.5 tab) daily x 1 week and then 10 mg (1 tab) daily thereafter.    fluticasone (FLONASE) 50 mcg/actuation nasal spray 1 spray (50 mcg total) by Each Nare route once daily.    lubiprostone (AMITIZA) 8 MCG Cap Take 1 capsule (8 mcg total) by mouth 2 (two) times daily with meals.    sumatriptan (IMITREX) 50 MG tablet Take 1 at onset of severe headache.  If needed may take 1 more pill (to total 2 pills) two hours later.  Do not exceed 2 pills in 24 hours.     No current facility-administered medications for this visit.        Review of patient's allergies indicates:   Allergen Reactions    Doxycycline        Family History   Problem Relation Age of Onset    Diabetes Mother     Heart disease Mother 50        CABG x3    Cancer Maternal Grandfather 64         colon cancer    Colon cancer Maternal Grandfather 65    Crohn's disease Neg Hx     Ulcerative colitis Neg Hx     Celiac disease Neg Hx        Social History     Socioeconomic History    Marital status:      Spouse name: Not on file    Number of children: Not on file    Years of education: Not on file    Highest education level: Not on file   Occupational History    Not on file   Social Needs    Financial resource strain: Not on file    Food insecurity     Worry: Not on file     Inability: Not on file    Transportation needs     Medical: Not on file     Non-medical: Not on file   Tobacco Use    Smoking status: Never Smoker    Smokeless tobacco: Never Used   Substance and Sexual Activity    Alcohol use: No    Drug use: No    Sexual activity: Not on file   Lifestyle    Physical activity     Days per week: Not on file     Minutes per session: Not on file    Stress: Not on file   Relationships    Social connections     Talks on phone: Not on file     Gets together: Not on file     Attends Christian service: Not on file     Active member of club or organization: Not on file     Attends meetings of clubs or organizations: Not on file     Relationship status: Not on file   Other Topics Concern    Not on file   Social History Narrative    Not on file       Chief Complaint:   Chief Complaint   Patient presents with    Right Shoulder - Pain, Follow-up     Here for Review of  MRI Right Shoulder          History of present illness:    This is a 45 y.o. year old male who complains of patient returns today for follow-up of his right shoulder he continues to have shoulder pain and right shoulder    Review of Systems:    Constitution: Denies chills, fever, and sweats.  HENT: Denies headaches or blurry vision.  Cardiovascular: Denies chest pain or irregular heart beat.  Respiratory: Denies cough or shortness of breath.  Gastrointestinal: Denies abdominal pain, nausea, or vomiting.  Musculoskeletal:   "Denies muscle cramps.  Neurological: Denies dizziness or focal weakness.  Psychiatric/Behavioral: Normal mental status.  Hematologic/Lymphatic: Denies bleeding problem or easy bruising/bleeding.  Skin: Denies rash or suspicious lesions.    Examination:    Vital Signs:    Vitals:    07/14/20 1411   Temp: 98 °F (36.7 °C)   Weight: 63.5 kg (140 lb)   Height: 5' 7" (1.702 m)   PainSc:   4   PainLoc: Shoulder       Body mass index is 21.93 kg/m².    This a well-developed, well nourished patient in no acute distress.    Alert and oriented x 3 and cooperative to examination.       Physical Exam:  Right shoulder-patient has excellent range of motion has good strength in his rotator cuff he has tenderness to palpation over the acromioclavicular joint    Imaging:  An MRI of the right shoulder showed no pathology in his rotator cuff and no internal derangement of the shoulder it showed moderate arthritis of the clavicular joint with kaz articular inflammation       Assessment: Arthritis of right acromioclavicular joint        Plan:  I would discussed with him treatment options which would be we could start with an injection years acromioclavicular joint to see if this helps the inflammation if it does not then I would recommend that he have a simple lateral clavicle resection.  Patient understands treatment options and wishes to have a cortisone injection today will see him back in about 4 weeks if he is no better I would recommend distal clavicle resection.      DISCLAIMER: This note may have been dictated using voice recognition software and may contain grammatical errors.     NOTE: Consult report sent to referring provider via EPIC EMR.    "

## 2020-08-28 ENCOUNTER — OFFICE VISIT (OUTPATIENT)
Dept: ORTHOPEDICS | Facility: CLINIC | Age: 45
End: 2020-08-28
Payer: COMMERCIAL

## 2020-08-28 VITALS — WEIGHT: 140 LBS | HEIGHT: 67 IN | BODY MASS INDEX: 21.97 KG/M2

## 2020-08-28 DIAGNOSIS — M75.41 IMPINGEMENT SYNDROME OF RIGHT SHOULDER: Primary | ICD-10-CM

## 2020-08-28 DIAGNOSIS — Z01.818 PRE-OP TESTING: ICD-10-CM

## 2020-08-28 PROCEDURE — 99213 PR OFFICE/OUTPT VISIT, EST, LEVL III, 20-29 MIN: ICD-10-PCS | Mod: S$GLB,,, | Performed by: ORTHOPAEDIC SURGERY

## 2020-08-28 PROCEDURE — 99213 OFFICE O/P EST LOW 20 MIN: CPT | Mod: S$GLB,,, | Performed by: ORTHOPAEDIC SURGERY

## 2020-08-28 PROCEDURE — 3008F PR BODY MASS INDEX (BMI) DOCUMENTED: ICD-10-PCS | Mod: CPTII,S$GLB,, | Performed by: ORTHOPAEDIC SURGERY

## 2020-08-28 PROCEDURE — 99999 PR PBB SHADOW E&M-EST. PATIENT-LVL III: ICD-10-PCS | Mod: PBBFAC,,, | Performed by: ORTHOPAEDIC SURGERY

## 2020-08-28 PROCEDURE — 3008F BODY MASS INDEX DOCD: CPT | Mod: CPTII,S$GLB,, | Performed by: ORTHOPAEDIC SURGERY

## 2020-08-28 PROCEDURE — 99999 PR PBB SHADOW E&M-EST. PATIENT-LVL III: CPT | Mod: PBBFAC,,, | Performed by: ORTHOPAEDIC SURGERY

## 2020-08-28 RX ORDER — CEFAZOLIN SODIUM 2 G/50ML
2 SOLUTION INTRAVENOUS
Status: CANCELLED | OUTPATIENT
Start: 2020-08-28

## 2020-08-28 NOTE — H&P (VIEW-ONLY)
8/28/2020    Past Medical History:   Diagnosis Date    Colon polyp     Diverticulosis     Epidermoid cyst of testis     left    H. pylori infection     Hydrocele, left     IBS (irritable bowel syndrome)     Peptic ulcer disease        Past Surgical History:   Procedure Laterality Date    ADENOIDECTOMY      COLONOSCOPY  06/2015    repeat in 5 years for surveillance    COLONOSCOPY N/A 6/4/2020    Procedure: COLONOSCOPY;  Surgeon: Sheila Adams MD;  Location: [facility] ENDO;  Service: Endoscopy;  Laterality: N/A;    ESOPHAGOGASTRODUODENOSCOPY N/A 6/4/2020    Procedure: EGD (ESOPHAGOGASTRODUODENOSCOPY);  Surgeon: Sheila Adams MD;  Location: [facility] ENDO;  Service: Endoscopy;  Laterality: N/A;    KNEE ARTHROSCOPY W/ DEBRIDEMENT  2012    right knee     TONSILLECTOMY      UPPER GASTROINTESTINAL ENDOSCOPY  05/2015    repeat in 8 weeks to reassess ulcer for healing       Current Outpatient Medications   Medication Sig    pantoprazole (PROTONIX) 40 MG tablet Take 1 tablet (40 mg total) by mouth once daily.    CYANOCOBALAMIN, VITAMIN B-12, (VITAMIN B-12 ORAL) Take 1 each by mouth once daily.    escitalopram oxalate (LEXAPRO) 10 MG tablet Take 5 mg (0.5 tab) daily x 1 week and then 10 mg (1 tab) daily thereafter.    fluticasone (FLONASE) 50 mcg/actuation nasal spray 1 spray (50 mcg total) by Each Nare route once daily.    lubiprostone (AMITIZA) 8 MCG Cap Take 1 capsule (8 mcg total) by mouth 2 (two) times daily with meals.    sumatriptan (IMITREX) 50 MG tablet Take 1 at onset of severe headache.  If needed may take 1 more pill (to total 2 pills) two hours later.  Do not exceed 2 pills in 24 hours.     No current facility-administered medications for this visit.        Review of patient's allergies indicates:   Allergen Reactions    Doxycycline        Family History   Problem Relation Age of Onset    Diabetes Mother     Heart disease Mother 50        CABG x3    Cancer Maternal Grandfather 64         colon cancer    Colon cancer Maternal Grandfather 65    Crohn's disease Neg Hx     Ulcerative colitis Neg Hx     Celiac disease Neg Hx        Social History     Socioeconomic History    Marital status:      Spouse name: Not on file    Number of children: Not on file    Years of education: Not on file    Highest education level: Not on file   Occupational History    Not on file   Social Needs    Financial resource strain: Not on file    Food insecurity     Worry: Not on file     Inability: Not on file    Transportation needs     Medical: Not on file     Non-medical: Not on file   Tobacco Use    Smoking status: Never Smoker    Smokeless tobacco: Never Used   Substance and Sexual Activity    Alcohol use: No    Drug use: No    Sexual activity: Not on file   Lifestyle    Physical activity     Days per week: Not on file     Minutes per session: Not on file    Stress: Not on file   Relationships    Social connections     Talks on phone: Not on file     Gets together: Not on file     Attends Synagogue service: Not on file     Active member of club or organization: Not on file     Attends meetings of clubs or organizations: Not on file     Relationship status: Not on file   Other Topics Concern    Not on file   Social History Narrative    Not on file       Chief Complaint:   Chief Complaint   Patient presents with    Right Shoulder - Follow-up     LOV 07/14 GAVE INJECTIO TO HELP WITH THE PAIN OF ACROMIOCLAVICULAR JOINT ARTHRITIS. INJ ONLY HELPED FOR ABOUT 3 WEEKS. Recommended  that he have a simple lateral clavicle resection if no improvement.          History of present illness:    This is a 45 y.o. year old male who complains of patient follows up today for his right shoulder pain he states we gave him injection last time which lasted for about 3 or 4 weeks now the pain is returned the patient has an MRI which shows evidence of AC joint arthritis it also shows a tight subacromial space with a  "downsloping acromion patient complains mostly with overhead activity which bothers his right shoulder    Review of Systems:    Constitution: Denies chills, fever, and sweats.  HENT: Denies headaches or blurry vision.  Cardiovascular: Denies chest pain or irregular heart beat.  Respiratory: Denies cough or shortness of breath.  Gastrointestinal: Denies abdominal pain, nausea, or vomiting.  Musculoskeletal:  Denies muscle cramps.  Neurological: Denies dizziness or focal weakness.  Psychiatric/Behavioral: Normal mental status.  Hematologic/Lymphatic: Denies bleeding problem or easy bruising/bleeding.  Skin: Denies rash or suspicious lesions.    Examination:    Vital Signs:    Vitals:    08/28/20 1149   Weight: 63.5 kg (140 lb)   Height: 5' 7" (1.702 m)   PainSc:   5   PainLoc: Shoulder       Body mass index is 21.93 kg/m².    This a well-developed, well nourished patient in no acute distress.    Alert and oriented x 3 and cooperative to examination.       Physical Exam:  Right shoulder-patient's pain appears to be in the subacromial area and pain is increased with overhead activity he has a good strong rotator cuff he also has tenderness over the acromioclavicular joint    Imaging:  His MRI shows acromioclavicular arthritis inflammation around the acromioclavicular joint and a low-lying acromion with a tight subacromial space his rotator cuff looks good with no evidence of any tears       Assessment: Impingement syndrome of right shoulder        Plan:  Recommend arthroscopy of the right shoulder distal clavicle resection and acromioplasty      DISCLAIMER: This note may have been dictated using voice recognition software and may contain grammatical errors.     NOTE: Consult report sent to referring provider via EPIC EMR.    "

## 2020-08-28 NOTE — PROGRESS NOTES
8/28/2020    Past Medical History:   Diagnosis Date    Colon polyp     Diverticulosis     Epidermoid cyst of testis     left    H. pylori infection     Hydrocele, left     IBS (irritable bowel syndrome)     Peptic ulcer disease        Past Surgical History:   Procedure Laterality Date    ADENOIDECTOMY      COLONOSCOPY  06/2015    repeat in 5 years for surveillance    COLONOSCOPY N/A 6/4/2020    Procedure: COLONOSCOPY;  Surgeon: Sheila Adams MD;  Location: [facility] ENDO;  Service: Endoscopy;  Laterality: N/A;    ESOPHAGOGASTRODUODENOSCOPY N/A 6/4/2020    Procedure: EGD (ESOPHAGOGASTRODUODENOSCOPY);  Surgeon: Sheila Adams MD;  Location: [facility] ENDO;  Service: Endoscopy;  Laterality: N/A;    KNEE ARTHROSCOPY W/ DEBRIDEMENT  2012    right knee     TONSILLECTOMY      UPPER GASTROINTESTINAL ENDOSCOPY  05/2015    repeat in 8 weeks to reassess ulcer for healing       Current Outpatient Medications   Medication Sig    pantoprazole (PROTONIX) 40 MG tablet Take 1 tablet (40 mg total) by mouth once daily.    CYANOCOBALAMIN, VITAMIN B-12, (VITAMIN B-12 ORAL) Take 1 each by mouth once daily.    escitalopram oxalate (LEXAPRO) 10 MG tablet Take 5 mg (0.5 tab) daily x 1 week and then 10 mg (1 tab) daily thereafter.    fluticasone (FLONASE) 50 mcg/actuation nasal spray 1 spray (50 mcg total) by Each Nare route once daily.    lubiprostone (AMITIZA) 8 MCG Cap Take 1 capsule (8 mcg total) by mouth 2 (two) times daily with meals.    sumatriptan (IMITREX) 50 MG tablet Take 1 at onset of severe headache.  If needed may take 1 more pill (to total 2 pills) two hours later.  Do not exceed 2 pills in 24 hours.     No current facility-administered medications for this visit.        Review of patient's allergies indicates:   Allergen Reactions    Doxycycline        Family History   Problem Relation Age of Onset    Diabetes Mother     Heart disease Mother 50        CABG x3    Cancer Maternal Grandfather 64         colon cancer    Colon cancer Maternal Grandfather 65    Crohn's disease Neg Hx     Ulcerative colitis Neg Hx     Celiac disease Neg Hx        Social History     Socioeconomic History    Marital status:      Spouse name: Not on file    Number of children: Not on file    Years of education: Not on file    Highest education level: Not on file   Occupational History    Not on file   Social Needs    Financial resource strain: Not on file    Food insecurity     Worry: Not on file     Inability: Not on file    Transportation needs     Medical: Not on file     Non-medical: Not on file   Tobacco Use    Smoking status: Never Smoker    Smokeless tobacco: Never Used   Substance and Sexual Activity    Alcohol use: No    Drug use: No    Sexual activity: Not on file   Lifestyle    Physical activity     Days per week: Not on file     Minutes per session: Not on file    Stress: Not on file   Relationships    Social connections     Talks on phone: Not on file     Gets together: Not on file     Attends [employer] service: Not on file     Active member of club or organization: Not on file     Attends meetings of clubs or organizations: Not on file     Relationship status: Not on file   Other Topics Concern    Not on file   Social History Narrative    Not on file       Chief Complaint:   Chief Complaint   Patient presents with    Right Shoulder - Follow-up     LOV 07/14 GAVE INJECTIO TO HELP WITH THE PAIN OF ACROMIOCLAVICULAR JOINT ARTHRITIS. INJ ONLY HELPED FOR ABOUT 3 WEEKS. Recommended  that he have a simple lateral clavicle resection if no improvement.          History of present illness:    This is a 45 y.o. year old male who complains of patient follows up today for his right shoulder pain he states we gave him injection last time which lasted for about 3 or 4 weeks now the pain is returned the patient has an MRI which shows evidence of AC joint arthritis it also shows a tight subacromial space with a  "downsloping acromion patient complains mostly with overhead activity which bothers his right shoulder    Review of Systems:    Constitution: Denies chills, fever, and sweats.  HENT: Denies headaches or blurry vision.  Cardiovascular: Denies chest pain or irregular heart beat.  Respiratory: Denies cough or shortness of breath.  Gastrointestinal: Denies abdominal pain, nausea, or vomiting.  Musculoskeletal:  Denies muscle cramps.  Neurological: Denies dizziness or focal weakness.  Psychiatric/Behavioral: Normal mental status.  Hematologic/Lymphatic: Denies bleeding problem or easy bruising/bleeding.  Skin: Denies rash or suspicious lesions.    Examination:    Vital Signs:    Vitals:    08/28/20 1149   Weight: 63.5 kg (140 lb)   Height: 5' 7" (1.702 m)   PainSc:   5   PainLoc: Shoulder       Body mass index is 21.93 kg/m².    This a well-developed, well nourished patient in no acute distress.    Alert and oriented x 3 and cooperative to examination.       Physical Exam:  Right shoulder-patient's pain appears to be in the subacromial area and pain is increased with overhead activity he has a good strong rotator cuff he also has tenderness over the acromioclavicular joint    Imaging:  His MRI shows acromioclavicular arthritis inflammation around the acromioclavicular joint and a low-lying acromion with a tight subacromial space his rotator cuff looks good with no evidence of any tears       Assessment: Impingement syndrome of right shoulder        Plan:  Recommend arthroscopy of the right shoulder distal clavicle resection and acromioplasty      DISCLAIMER: This note may have been dictated using voice recognition software and may contain grammatical errors.     NOTE: Consult report sent to referring provider via EPIC EMR.    "

## 2020-08-31 ENCOUNTER — TELEPHONE (OUTPATIENT)
Dept: ORTHOPEDICS | Facility: CLINIC | Age: 45
End: 2020-08-31

## 2020-08-31 NOTE — TELEPHONE ENCOUNTER
Returned call. Spouse just wanted to inform us that patient has DX of basal cell carcinoma behind his ear, and is scheduled for an outpatient procedure today under general anesthesia for removal. She just wanted to ensure that this would not interfere with patient's scheduled procedure on 9/10/20 with Dr. Astorga (Right Distal Clavicle Excision & Acromioplasty). Informed patient that this should not interfere. However, will route message to Dr. Astorga as an FYI.

## 2020-08-31 NOTE — TELEPHONE ENCOUNTER
----- Message from Merritt Alcocer sent at 8/31/2020 11:37 AM CDT -----  Regarding: pre Sx question  Contact: Nick rosa

## 2020-09-01 ENCOUNTER — HOSPITAL ENCOUNTER (OUTPATIENT)
Dept: RADIOLOGY | Facility: HOSPITAL | Age: 45
Discharge: HOME | End: 2020-09-01
Attending: ORTHOPAEDIC SURGERY
Payer: COMMERCIAL

## 2020-09-01 ENCOUNTER — HOSPITAL ENCOUNTER (OUTPATIENT)
Dept: PREADMISSION TESTING | Facility: HOSPITAL | Age: 45
Discharge: HOME | End: 2020-09-01
Attending: ORTHOPAEDIC SURGERY
Payer: COMMERCIAL

## 2020-09-01 ENCOUNTER — LAB VISIT (OUTPATIENT)
Dept: LAB | Facility: HOSPITAL | Age: 45
End: 2020-09-01
Attending: ORTHOPAEDIC SURGERY
Payer: COMMERCIAL

## 2020-09-01 VITALS
TEMPERATURE: 98 F | DIASTOLIC BLOOD PRESSURE: 76 MMHG | HEIGHT: 66 IN | BODY MASS INDEX: 25.22 KG/M2 | OXYGEN SATURATION: 97 % | SYSTOLIC BLOOD PRESSURE: 119 MMHG | HEART RATE: 67 BPM | RESPIRATION RATE: 18 BRPM | WEIGHT: 156.94 LBS

## 2020-09-01 DIAGNOSIS — M75.41 IMPINGEMENT SYNDROME OF RIGHT SHOULDER: ICD-10-CM

## 2020-09-01 DIAGNOSIS — Z01.811 PRE-OP CHEST EXAM: Primary | ICD-10-CM

## 2020-09-01 DIAGNOSIS — M75.41 IMPINGEMENT SYNDROME OF RIGHT SHOULDER: Primary | ICD-10-CM

## 2020-09-01 DIAGNOSIS — Z01.811 PRE-OP CHEST EXAM: ICD-10-CM

## 2020-09-01 LAB
ALBUMIN SERPL BCP-MCNC: 4.3 G/DL (ref 3.5–5.2)
ALP SERPL-CCNC: 51 U/L (ref 55–135)
ALT SERPL W/O P-5'-P-CCNC: 43 U/L (ref 10–44)
ANION GAP SERPL CALC-SCNC: 7 MMOL/L (ref 8–16)
AST SERPL-CCNC: 32 U/L (ref 10–40)
BASOPHILS # BLD AUTO: 0.02 K/UL (ref 0–0.2)
BASOPHILS NFR BLD: 0.3 % (ref 0–1.9)
BILIRUB SERPL-MCNC: 0.8 MG/DL (ref 0.1–1)
BILIRUB UR QL STRIP: NEGATIVE
BUN SERPL-MCNC: 16 MG/DL (ref 6–20)
CALCIUM SERPL-MCNC: 8.5 MG/DL (ref 8.7–10.5)
CHLORIDE SERPL-SCNC: 102 MMOL/L (ref 95–110)
CLARITY UR: CLEAR
CO2 SERPL-SCNC: 29 MMOL/L (ref 23–29)
COLOR UR: COLORLESS
CREAT SERPL-MCNC: 1 MG/DL (ref 0.5–1.4)
DIFFERENTIAL METHOD: ABNORMAL
EOSINOPHIL # BLD AUTO: 0.1 K/UL (ref 0–0.5)
EOSINOPHIL NFR BLD: 1.6 % (ref 0–8)
ERYTHROCYTE [DISTWIDTH] IN BLOOD BY AUTOMATED COUNT: 12.3 % (ref 11.5–14.5)
EST. GFR  (AFRICAN AMERICAN): >60 ML/MIN/1.73 M^2
EST. GFR  (NON AFRICAN AMERICAN): >60 ML/MIN/1.73 M^2
GLUCOSE SERPL-MCNC: 91 MG/DL (ref 70–110)
GLUCOSE UR QL STRIP: NEGATIVE
HCT VFR BLD AUTO: 40.2 % (ref 40–54)
HGB BLD-MCNC: 13.7 G/DL (ref 14–18)
HGB UR QL STRIP: NEGATIVE
IMM GRANULOCYTES # BLD AUTO: 0.02 K/UL (ref 0–0.04)
IMM GRANULOCYTES NFR BLD AUTO: 0.3 % (ref 0–0.5)
KETONES UR QL STRIP: NEGATIVE
LEUKOCYTE ESTERASE UR QL STRIP: NEGATIVE
LYMPHOCYTES # BLD AUTO: 2.5 K/UL (ref 1–4.8)
LYMPHOCYTES NFR BLD: 37.2 % (ref 18–48)
MCH RBC QN AUTO: 30.7 PG (ref 27–31)
MCHC RBC AUTO-ENTMCNC: 34.1 G/DL (ref 32–36)
MCV RBC AUTO: 90 FL (ref 82–98)
MONOCYTES # BLD AUTO: 0.3 K/UL (ref 0.3–1)
MONOCYTES NFR BLD: 4.5 % (ref 4–15)
NEUTROPHILS # BLD AUTO: 3.8 K/UL (ref 1.8–7.7)
NEUTROPHILS NFR BLD: 56.1 % (ref 38–73)
NITRITE UR QL STRIP: NEGATIVE
NRBC BLD-RTO: 0 /100 WBC
PH UR STRIP: 7 [PH] (ref 5–8)
PLATELET # BLD AUTO: 157 K/UL (ref 150–350)
PMV BLD AUTO: 10.1 FL (ref 9.2–12.9)
POTASSIUM SERPL-SCNC: 3.7 MMOL/L (ref 3.5–5.1)
PROT SERPL-MCNC: 6.9 G/DL (ref 6–8.4)
PROT UR QL STRIP: NEGATIVE
RBC # BLD AUTO: 4.46 M/UL (ref 4.6–6.2)
SODIUM SERPL-SCNC: 138 MMOL/L (ref 136–145)
SP GR UR STRIP: 1 (ref 1–1.03)
URN SPEC COLLECT METH UR: ABNORMAL
UROBILINOGEN UR STRIP-ACNC: NEGATIVE EU/DL
WBC # BLD AUTO: 6.7 K/UL (ref 3.9–12.7)

## 2020-09-01 PROCEDURE — 80053 COMPREHEN METABOLIC PANEL: CPT

## 2020-09-01 PROCEDURE — 93010 EKG 12-LEAD: ICD-10-PCS | Mod: ,,, | Performed by: INTERNAL MEDICINE

## 2020-09-01 PROCEDURE — 81003 URINALYSIS AUTO W/O SCOPE: CPT

## 2020-09-01 PROCEDURE — 71046 X-RAY EXAM CHEST 2 VIEWS: CPT | Mod: TC

## 2020-09-01 PROCEDURE — 93010 ELECTROCARDIOGRAM REPORT: CPT | Mod: ,,, | Performed by: INTERNAL MEDICINE

## 2020-09-01 PROCEDURE — 36415 COLL VENOUS BLD VENIPUNCTURE: CPT

## 2020-09-01 PROCEDURE — 85025 COMPLETE CBC W/AUTO DIFF WBC: CPT

## 2020-09-01 PROCEDURE — 93005 ELECTROCARDIOGRAM TRACING: CPT | Performed by: INTERNAL MEDICINE

## 2020-09-01 NOTE — CARE UPDATE
Notified patient of appointment 9/8/2020 for Covid screen at 1:15 pm at [facility].

## 2020-09-01 NOTE — DISCHARGE INSTRUCTIONS
To confirm, Your doctor has instructed you that surgery is scheduled for:   Thursday, September 10, 2020    Pre-Op will call the afternoon prior to surgery between 4:00 and 6:00 PM with the final arrival time.   Wednesday, September 9, 2020     Please report to [facility] via [facility] entrance.    Do not eat or drink anything after midnight the night before your surgery - THIS INCLUDES  WATER, GUM, MINTS AND CANDY.  YOU MAY BRUSH YOUR TEETH BUT DO NOT SWALLOW     TAKE ONLY THESE MEDICATIONS WITH A SMALL SIP OF WATER THE MORNING OF YOUR PROCEDURE:  PROTONIX      PLEASE NOTE:  The surgery schedule has many variables which may affect the time of your surgery case.  Family members should be available if your surgery time changes.  Plan to be here the day of your procedure between 4-6 hours.      DO NOT TAKE THESE MEDICATIONS 5-7 DAYS PRIOR to your procedure or per your surgeon's request: ASPIRIN, ALEVE, ADVIL, IBUPROFEN,  GALA SELTZER, BC , FISH OIL , VITAMIN E, HERBALS  (May take Tylenol)                                                       IMPORTANT INSTRUCTIONS    · Shower the night before AND the morning of your procedure with a Chlorhexidine wash such as Hibiclens or Dial antibacterial soap from the neck down.   ·  Do not get it on your face or in your eyes.  You may use your own shampoo and face wash. This helps your skin to be as bacteria free as possible.    · Sleep in a bed with clean sheets.  Do not sleep with a pet in the bed.     · Please leave all jewelry, piercing's and valuables at home.   · If your doctor has scheduled you for an overnight stay, bring a small overnight bag with any personal items you need.    · Make arrangements in advance for transportation home by a responsible adult.      · You must make arrangements for transportation, TAXI'S, UBER'S OR LYFTS ARE NOT ALLOWED.        If you have any questions about these instructions, call Pre-Op Admit  Nursing at [phone] or the  Pre-Op Day Surgery Unit at [phone].

## 2020-09-08 ENCOUNTER — HOSPITAL ENCOUNTER (OUTPATIENT)
Dept: PREADMISSION TESTING | Facility: HOSPITAL | Age: 45
Discharge: HOME | End: 2020-09-08
Attending: ORTHOPAEDIC SURGERY
Payer: COMMERCIAL

## 2020-09-08 DIAGNOSIS — Z01.818 PRE-OP TESTING: ICD-10-CM

## 2020-09-08 PROCEDURE — U0003 INFECTIOUS AGENT DETECTION BY NUCLEIC ACID (DNA OR RNA); SEVERE ACUTE RESPIRATORY SYNDROME CORONAVIRUS 2 (SARS-COV-2) (CORONAVIRUS DISEASE [COVID-19]), AMPLIFIED PROBE TECHNIQUE, MAKING USE OF HIGH THROUGHPUT TECHNOLOGIES AS DESCRIBED BY CMS-2020-01-R: HCPCS

## 2020-09-09 ENCOUNTER — TELEPHONE (OUTPATIENT)
Dept: ORTHOPEDICS | Facility: CLINIC | Age: 45
End: 2020-09-09

## 2020-09-09 LAB — SARS-COV-2 RNA RESP QL NAA+PROBE: NOT DETECTED

## 2020-09-09 NOTE — TELEPHONE ENCOUNTER
Auth Code for scheduled SX on 09/10/20: [id number]   Approved CPT Codes: 40258 and 34073 DX Code: M75.41.

## 2020-09-09 NOTE — TELEPHONE ENCOUNTER
----- Message from Aleyda Gomez MA sent at 9/9/2020 11:09 AM CDT -----  Contact: wife,  mag Bledsoe procedure this AM   Given Abdirahman,,   Call back

## 2020-09-10 ENCOUNTER — ANESTHESIA (OUTPATIENT)
Dept: SURGERY | Facility: HOSPITAL | Age: 45
End: 2020-09-10
Payer: COMMERCIAL

## 2020-09-10 ENCOUNTER — ANESTHESIA EVENT (OUTPATIENT)
Dept: SURGERY | Facility: HOSPITAL | Age: 45
End: 2020-09-10
Payer: COMMERCIAL

## 2020-09-10 ENCOUNTER — HOSPITAL ENCOUNTER (OUTPATIENT)
Facility: HOSPITAL | Age: 45
Discharge: HOME | End: 2020-09-10
Attending: ORTHOPAEDIC SURGERY | Admitting: ORTHOPAEDIC SURGERY
Payer: COMMERCIAL

## 2020-09-10 VITALS
WEIGHT: 156 LBS | SYSTOLIC BLOOD PRESSURE: 117 MMHG | OXYGEN SATURATION: 94 % | DIASTOLIC BLOOD PRESSURE: 61 MMHG | BODY MASS INDEX: 25.07 KG/M2 | TEMPERATURE: 99 F | RESPIRATION RATE: 12 BRPM | HEIGHT: 66 IN | HEART RATE: 112 BPM

## 2020-09-10 DIAGNOSIS — M75.41 IMPINGEMENT SYNDROME OF RIGHT SHOULDER: ICD-10-CM

## 2020-09-10 PROCEDURE — 25000003 PHARM REV CODE 250: Performed by: NURSE ANESTHETIST, CERTIFIED REGISTERED

## 2020-09-10 PROCEDURE — 36000711: Performed by: ORTHOPAEDIC SURGERY

## 2020-09-10 PROCEDURE — 63600175 PHARM REV CODE 636 W HCPCS: Performed by: STUDENT IN AN ORGANIZED HEALTH CARE EDUCATION/TRAINING PROGRAM

## 2020-09-10 PROCEDURE — 36000710: Performed by: ORTHOPAEDIC SURGERY

## 2020-09-10 PROCEDURE — 71000039 HC RECOVERY, EACH ADD'L HOUR: Performed by: ORTHOPAEDIC SURGERY

## 2020-09-10 PROCEDURE — 23120 PR PARTIAL REMOVAL, CLAVICLE: ICD-10-PCS | Mod: RT,,, | Performed by: ORTHOPAEDIC SURGERY

## 2020-09-10 PROCEDURE — 25000003 PHARM REV CODE 250: Performed by: STUDENT IN AN ORGANIZED HEALTH CARE EDUCATION/TRAINING PROGRAM

## 2020-09-10 PROCEDURE — 63600175 PHARM REV CODE 636 W HCPCS: Performed by: ANESTHESIOLOGY

## 2020-09-10 PROCEDURE — 63600175 PHARM REV CODE 636 W HCPCS: Performed by: NURSE ANESTHETIST, CERTIFIED REGISTERED

## 2020-09-10 PROCEDURE — 63600175 PHARM REV CODE 636 W HCPCS: Performed by: ORTHOPAEDIC SURGERY

## 2020-09-10 PROCEDURE — 27000656 HC EYE GOGGLES: Performed by: STUDENT IN AN ORGANIZED HEALTH CARE EDUCATION/TRAINING PROGRAM

## 2020-09-10 PROCEDURE — 25000003 PHARM REV CODE 250: Performed by: ORTHOPAEDIC SURGERY

## 2020-09-10 PROCEDURE — 23120 CLAVICULECTOMY PARTIAL: CPT | Mod: RT,,, | Performed by: ORTHOPAEDIC SURGERY

## 2020-09-10 PROCEDURE — 37000009 HC ANESTHESIA EA ADD 15 MINS: Performed by: ORTHOPAEDIC SURGERY

## 2020-09-10 PROCEDURE — 29822 SHO ARTHRS SRG LMTD DBRDMT: CPT | Mod: 59,51,RT, | Performed by: ORTHOPAEDIC SURGERY

## 2020-09-10 PROCEDURE — 71000016 HC POSTOP RECOV ADDL HR: Performed by: ORTHOPAEDIC SURGERY

## 2020-09-10 PROCEDURE — 27201423 OPTIME MED/SURG SUP & DEVICES STERILE SUPPLY: Performed by: ORTHOPAEDIC SURGERY

## 2020-09-10 PROCEDURE — 29822 PR SHLDR ARTHROSCOP,PART DEBRIDE: ICD-10-PCS | Mod: 59,51,RT, | Performed by: ORTHOPAEDIC SURGERY

## 2020-09-10 PROCEDURE — 71000033 HC RECOVERY, INTIAL HOUR: Performed by: ORTHOPAEDIC SURGERY

## 2020-09-10 PROCEDURE — 27201107 HC STYLET, STANDARD: Performed by: STUDENT IN AN ORGANIZED HEALTH CARE EDUCATION/TRAINING PROGRAM

## 2020-09-10 PROCEDURE — 37000008 HC ANESTHESIA 1ST 15 MINUTES: Performed by: ORTHOPAEDIC SURGERY

## 2020-09-10 PROCEDURE — 71000015 HC POSTOP RECOV 1ST HR: Performed by: ORTHOPAEDIC SURGERY

## 2020-09-10 PROCEDURE — 27000671 HC TUBING MICROBORE EXT: Performed by: STUDENT IN AN ORGANIZED HEALTH CARE EDUCATION/TRAINING PROGRAM

## 2020-09-10 RX ORDER — CEFAZOLIN SODIUM 2 G/50ML
2 SOLUTION INTRAVENOUS
Status: COMPLETED | OUTPATIENT
Start: 2020-09-10 | End: 2020-09-10

## 2020-09-10 RX ORDER — PROPOFOL 10 MG/ML
VIAL (ML) INTRAVENOUS
Status: DISCONTINUED | OUTPATIENT
Start: 2020-09-10 | End: 2020-09-10

## 2020-09-10 RX ORDER — ROCURONIUM BROMIDE 10 MG/ML
INJECTION, SOLUTION INTRAVENOUS
Status: DISCONTINUED | OUTPATIENT
Start: 2020-09-10 | End: 2020-09-10

## 2020-09-10 RX ORDER — FENTANYL CITRATE 50 UG/ML
INJECTION, SOLUTION INTRAMUSCULAR; INTRAVENOUS
Status: DISCONTINUED | OUTPATIENT
Start: 2020-09-10 | End: 2020-09-10

## 2020-09-10 RX ORDER — ONDANSETRON 2 MG/ML
4 INJECTION INTRAMUSCULAR; INTRAVENOUS DAILY PRN
Status: DISCONTINUED | OUTPATIENT
Start: 2020-09-10 | End: 2020-09-10 | Stop reason: HOSPADM

## 2020-09-10 RX ORDER — EPHEDRINE SULFATE 50 MG/ML
INJECTION, SOLUTION INTRAVENOUS
Status: DISCONTINUED | OUTPATIENT
Start: 2020-09-10 | End: 2020-09-10

## 2020-09-10 RX ORDER — HYDROMORPHONE HYDROCHLORIDE 1 MG/ML
0.2 INJECTION, SOLUTION INTRAMUSCULAR; INTRAVENOUS; SUBCUTANEOUS
Status: COMPLETED | OUTPATIENT
Start: 2020-09-10 | End: 2020-09-10

## 2020-09-10 RX ORDER — SODIUM CHLORIDE 0.9 % (FLUSH) 0.9 %
10 SYRINGE (ML) INJECTION
Status: DISCONTINUED | OUTPATIENT
Start: 2020-09-10 | End: 2020-09-10 | Stop reason: HOSPADM

## 2020-09-10 RX ORDER — MEPERIDINE HYDROCHLORIDE 50 MG/ML
12.5 INJECTION INTRAMUSCULAR; INTRAVENOUS; SUBCUTANEOUS EVERY 10 MIN PRN
Status: DISCONTINUED | OUTPATIENT
Start: 2020-09-10 | End: 2020-09-10 | Stop reason: HOSPADM

## 2020-09-10 RX ORDER — GABAPENTIN 100 MG/1
100 CAPSULE ORAL ONCE
Status: COMPLETED | OUTPATIENT
Start: 2020-09-10 | End: 2020-09-10

## 2020-09-10 RX ORDER — FENTANYL CITRATE 50 UG/ML
25 INJECTION, SOLUTION INTRAMUSCULAR; INTRAVENOUS
Status: COMPLETED | OUTPATIENT
Start: 2020-09-10 | End: 2020-09-10

## 2020-09-10 RX ORDER — MIDAZOLAM HYDROCHLORIDE 5 MG/ML
INJECTION INTRAMUSCULAR; INTRAVENOUS
Status: DISCONTINUED | OUTPATIENT
Start: 2020-09-10 | End: 2020-09-10

## 2020-09-10 RX ORDER — SODIUM CHLORIDE 0.9 G/100ML
IRRIGANT IRRIGATION
Status: DISCONTINUED | OUTPATIENT
Start: 2020-09-10 | End: 2020-09-10 | Stop reason: HOSPADM

## 2020-09-10 RX ORDER — NEOSTIGMINE METHYLSULFATE 1 MG/ML
INJECTION, SOLUTION INTRAVENOUS
Status: DISCONTINUED | OUTPATIENT
Start: 2020-09-10 | End: 2020-09-10

## 2020-09-10 RX ORDER — SUCCINYLCHOLINE CHLORIDE 20 MG/ML
INJECTION INTRAMUSCULAR; INTRAVENOUS
Status: DISCONTINUED | OUTPATIENT
Start: 2020-09-10 | End: 2020-09-10

## 2020-09-10 RX ORDER — DIAZEPAM 10 MG/2ML
5 INJECTION INTRAMUSCULAR ONCE
Status: COMPLETED | OUTPATIENT
Start: 2020-09-10 | End: 2020-09-10

## 2020-09-10 RX ORDER — DEXAMETHASONE SODIUM PHOSPHATE 4 MG/ML
INJECTION, SOLUTION INTRA-ARTICULAR; INTRALESIONAL; INTRAMUSCULAR; INTRAVENOUS; SOFT TISSUE
Status: DISCONTINUED | OUTPATIENT
Start: 2020-09-10 | End: 2020-09-10

## 2020-09-10 RX ORDER — OXYCODONE HYDROCHLORIDE 5 MG/1
5 TABLET ORAL ONCE
Status: DISCONTINUED | OUTPATIENT
Start: 2020-09-10 | End: 2020-09-10 | Stop reason: HOSPADM

## 2020-09-10 RX ORDER — EPINEPHRINE 0.1 MG/ML
INJECTION INTRAVENOUS
Status: DISCONTINUED | OUTPATIENT
Start: 2020-09-10 | End: 2020-09-10 | Stop reason: HOSPADM

## 2020-09-10 RX ORDER — OXYCODONE HYDROCHLORIDE 5 MG/1
5 TABLET ORAL
Status: DISCONTINUED | OUTPATIENT
Start: 2020-09-10 | End: 2020-09-10 | Stop reason: HOSPADM

## 2020-09-10 RX ORDER — PROMETHAZINE HYDROCHLORIDE 25 MG/1
25 TABLET ORAL EVERY 8 HOURS PRN
Qty: 20 TABLET | Refills: 1 | Status: SHIPPED | OUTPATIENT
Start: 2020-09-10 | End: 2021-03-03 | Stop reason: CLARIF

## 2020-09-10 RX ORDER — ONDANSETRON 2 MG/ML
4 INJECTION INTRAMUSCULAR; INTRAVENOUS EVERY 12 HOURS PRN
Status: CANCELLED | OUTPATIENT
Start: 2020-09-10

## 2020-09-10 RX ORDER — KETOROLAC TROMETHAMINE 30 MG/ML
30 INJECTION, SOLUTION INTRAMUSCULAR; INTRAVENOUS ONCE
Status: COMPLETED | OUTPATIENT
Start: 2020-09-10 | End: 2020-09-10

## 2020-09-10 RX ORDER — SODIUM CHLORIDE, SODIUM LACTATE, POTASSIUM CHLORIDE, CALCIUM CHLORIDE 600; 310; 30; 20 MG/100ML; MG/100ML; MG/100ML; MG/100ML
INJECTION, SOLUTION INTRAVENOUS CONTINUOUS PRN
Status: DISCONTINUED | OUTPATIENT
Start: 2020-09-10 | End: 2020-09-10

## 2020-09-10 RX ORDER — HYDROCODONE BITARTRATE AND ACETAMINOPHEN 10; 325 MG/1; MG/1
1 TABLET ORAL EVERY 4 HOURS PRN
Status: CANCELLED | OUTPATIENT
Start: 2020-09-10

## 2020-09-10 RX ORDER — DIPHENHYDRAMINE HYDROCHLORIDE 50 MG/ML
12.5 INJECTION INTRAMUSCULAR; INTRAVENOUS
Status: DISCONTINUED | OUTPATIENT
Start: 2020-09-10 | End: 2020-09-10 | Stop reason: HOSPADM

## 2020-09-10 RX ORDER — HYDROCODONE BITARTRATE AND ACETAMINOPHEN 10; 325 MG/1; MG/1
1 TABLET ORAL EVERY 6 HOURS PRN
Qty: 30 TABLET | Refills: 0 | Status: SHIPPED | OUTPATIENT
Start: 2020-09-10 | End: 2021-03-03 | Stop reason: CLARIF

## 2020-09-10 RX ORDER — ONDANSETRON 2 MG/ML
INJECTION INTRAMUSCULAR; INTRAVENOUS
Status: DISCONTINUED | OUTPATIENT
Start: 2020-09-10 | End: 2020-09-10

## 2020-09-10 RX ORDER — LIDOCAINE HCL/PF 100 MG/5ML
SYRINGE (ML) INTRAVENOUS
Status: DISCONTINUED | OUTPATIENT
Start: 2020-09-10 | End: 2020-09-10

## 2020-09-10 RX ORDER — ACETAMINOPHEN 500 MG
1000 TABLET ORAL ONCE
Status: COMPLETED | OUTPATIENT
Start: 2020-09-10 | End: 2020-09-10

## 2020-09-10 RX ADMIN — FENTANYL CITRATE 100 MCG: 50 INJECTION INTRAMUSCULAR; INTRAVENOUS at 08:09

## 2020-09-10 RX ADMIN — LIDOCAINE HYDROCHLORIDE 75 MG: 20 INJECTION, SOLUTION INTRAVENOUS at 08:09

## 2020-09-10 RX ADMIN — GABAPENTIN 100 MG: 100 CAPSULE ORAL at 08:09

## 2020-09-10 RX ADMIN — HYDROMORPHONE HYDROCHLORIDE 0.2 MG: 1 INJECTION, SOLUTION INTRAMUSCULAR; INTRAVENOUS; SUBCUTANEOUS at 11:09

## 2020-09-10 RX ADMIN — FENTANYL CITRATE 25 MCG: 50 INJECTION INTRAMUSCULAR; INTRAVENOUS at 11:09

## 2020-09-10 RX ADMIN — EPHEDRINE SULFATE 10 MG: 50 INJECTION, SOLUTION INTRAVENOUS at 09:09

## 2020-09-10 RX ADMIN — EPHEDRINE SULFATE 5 MG: 50 INJECTION, SOLUTION INTRAVENOUS at 09:09

## 2020-09-10 RX ADMIN — KETOROLAC TROMETHAMINE 30 MG: 30 INJECTION, SOLUTION INTRAMUSCULAR at 11:09

## 2020-09-10 RX ADMIN — ACETAMINOPHEN 1000 MG: 500 TABLET, FILM COATED ORAL at 08:09

## 2020-09-10 RX ADMIN — HYDROMORPHONE HYDROCHLORIDE 0.2 MG: 1 INJECTION, SOLUTION INTRAMUSCULAR; INTRAVENOUS; SUBCUTANEOUS at 10:09

## 2020-09-10 RX ADMIN — ROCURONIUM BROMIDE 10 MG: 10 INJECTION, SOLUTION INTRAVENOUS at 09:09

## 2020-09-10 RX ADMIN — OXYCODONE HYDROCHLORIDE 5 MG: 5 TABLET ORAL at 10:09

## 2020-09-10 RX ADMIN — DEXAMETHASONE SODIUM PHOSPHATE 4 MG: 4 INJECTION, SOLUTION INTRAMUSCULAR; INTRAVENOUS at 09:09

## 2020-09-10 RX ADMIN — GLYCOPYRROLATE 0.4 MG: 0.2 INJECTION, SOLUTION INTRAMUSCULAR; INTRAVITREAL at 10:09

## 2020-09-10 RX ADMIN — ONDANSETRON 4 MG: 2 INJECTION INTRAMUSCULAR; INTRAVENOUS at 09:09

## 2020-09-10 RX ADMIN — SUCCINYLCHOLINE CHLORIDE 160 MG: 20 INJECTION, SOLUTION INTRAMUSCULAR; INTRAVENOUS at 08:09

## 2020-09-10 RX ADMIN — PROPOFOL 150 MG: 10 INJECTION, EMULSION INTRAVENOUS at 08:09

## 2020-09-10 RX ADMIN — CEFAZOLIN SODIUM 2 G: 2 SOLUTION INTRAVENOUS at 09:09

## 2020-09-10 RX ADMIN — NEOSTIGMINE METHYLSULFATE 3 MG: 1 INJECTION INTRAVENOUS at 10:09

## 2020-09-10 RX ADMIN — MIDAZOLAM HYDROCHLORIDE 2 MG: 5 INJECTION, SOLUTION INTRAMUSCULAR; INTRAVENOUS at 08:09

## 2020-09-10 RX ADMIN — SODIUM CHLORIDE, SODIUM LACTATE, POTASSIUM CHLORIDE, AND CALCIUM CHLORIDE: .6; .31; .03; .02 INJECTION, SOLUTION INTRAVENOUS at 09:09

## 2020-09-10 RX ADMIN — SODIUM CHLORIDE, SODIUM LACTATE, POTASSIUM CHLORIDE, AND CALCIUM CHLORIDE: .6; .31; .03; .02 INJECTION, SOLUTION INTRAVENOUS at 08:09

## 2020-09-10 RX ADMIN — DIAZEPAM 5 MG: 5 INJECTION, SOLUTION INTRAMUSCULAR; INTRAVENOUS at 12:09

## 2020-09-10 RX ADMIN — ROCURONIUM BROMIDE 35 MG: 10 INJECTION, SOLUTION INTRAVENOUS at 09:09

## 2020-09-10 RX ADMIN — ROCURONIUM BROMIDE 5 MG: 10 INJECTION, SOLUTION INTRAVENOUS at 08:09

## 2020-09-10 NOTE — DISCHARGE INSTRUCTIONS

## 2020-09-10 NOTE — ANESTHESIA PROCEDURE NOTES
Intubation  Performed by: Kmiberly Wills CRNA  Authorized by: Presley Guzman MD     Intubation:     Induction:  Intravenous    Intubated:  Postinduction    Attempts:  1    Attempted By:  CRNA    Method of Intubation:  Direct    Blade:  H[place] 2    Difficult Airway Encountered?: No      Complications:  None    Airway Device:  Oral endotracheal tube    Style/Cuff Inflation:  Cuffed    Inflation Amount (mL):  6    Secured at:  The lips    Placement Verified By:  Capnometry    Complicating Factors:  None    Findings Post-Intubation:  BS equal bilateral and atraumatic/condition of teeth unchanged

## 2020-09-10 NOTE — TRANSFER OF CARE
"Anesthesia Transfer of Care Note    Patient: Rios Buck    Procedure(s) Performed: Procedure(s) (LRB):  EXCISION, CLAVICLE, DISTAL (Right)  ACROMIOPLASTY, ARTHROSCOPIC (Right)    Patient location: PACU    Anesthesia Type: general    Transport from OR: Transported from OR on room air with adequate spontaneous ventilation    Post pain: adequate analgesia    Post assessment: no apparent anesthetic complications    Post vital signs: stable    Level of consciousness: awake and alert    Nausea/Vomiting: no nausea/vomiting    Complications: none    Transfer of care protocol was followed      Last vitals:   Visit Vitals  /84 (BP Location: Right arm, Patient Position: Lying)   Pulse 66   Temp 37 °C (98.6 °F) (Oral)   Resp 12   Ht 5' 6" (1.676 m)   Wt 70.8 kg (156 lb)   SpO2 98%   BMI 25.18 kg/m²     "

## 2020-09-10 NOTE — ANESTHESIA PREPROCEDURE EVALUATION
09/10/2020  Rios Buck is a 45 y.o., male.      Patient Active Problem List   Diagnosis    Anxiety    Constipation    Abdominal bloating    Diarrhea    Gastritis determined by endoscopy    Impingement syndrome of right shoulder       Past Surgical History:   Procedure Laterality Date    ADENOIDECTOMY      COLONOSCOPY  06/2015    repeat in 5 years for surveillance    COLONOSCOPY N/A 6/4/2020    Procedure: COLONOSCOPY;  Surgeon: Sheila Adams MD;  Location: [facility] ENDO;  Service: Endoscopy;  Laterality: N/A;    ESOPHAGOGASTRODUODENOSCOPY N/A 6/4/2020    Procedure: EGD (ESOPHAGOGASTRODUODENOSCOPY);  Surgeon: Sheila Adams MD;  Location: [facility] ENDO;  Service: Endoscopy;  Laterality: N/A;    KNEE ARTHROSCOPY W/ DEBRIDEMENT  2012    right knee     TONSILLECTOMY      UPPER GASTROINTESTINAL ENDOSCOPY  05/2015    repeat in 8 weeks to reassess ulcer for healing        Tobacco Use:  The patient  reports that he has never smoked. He has never used smokeless tobacco.     Results for orders placed or performed during the hospital encounter of 09/01/20   EKG 12-lead    Collection Time: 09/01/20  2:07 PM    Narrative    Test Reason : M75.41,    Vent. Rate : 071 BPM     Atrial Rate : 071 BPM     P-R Int : 138 ms          QRS Dur : 086 ms      QT Int : 388 ms       P-R-T Axes : 057 069 045 degrees     QTc Int : 421 ms    Normal sinus rhythm  Normal ECG  When compared with ECG of 27-JUL-2017 14:45,  Nonspecific T wave abnormality no longer evident in Inferior leads  Nonspecific T wave abnormality no longer evident in Lateral leads  Confirmed by Juan Hsu MD ([id number]) on 9/2/2020 8:51:21 AM    Referred By:  BLANCA           Confirmed By:Juan Hsu MD             Lab Results   Component Value Date    WBC 6.70 09/01/2020    HGB 13.7 (L) 09/01/2020    HCT 40.2 09/01/2020    MCV 90 09/01/2020      09/01/2020     BMP  Lab Results   Component Value Date     09/01/2020    K 3.7 09/01/2020     09/01/2020    CO2 29 09/01/2020    BUN 16 09/01/2020    CREATININE 1.0 09/01/2020    CALCIUM 8.5 (L) 09/01/2020    ANIONGAP 7 (L) 09/01/2020    GLU 91 09/01/2020    GLU 91 06/10/2020    GLU 87 05/29/2020       No results found for this or any previous visit.            Anesthesia Evaluation    I have reviewed the Patient Summary Reports.    I have reviewed the Nursing Notes. I have reviewed the NPO Status.   I have reviewed the Medications.     Review of Systems  Social:  Non-Smoker, No Alcohol Use    Hematology/Oncology:  Hematology Normal   Oncology Normal     EENT/Dental:EENT/Dental Normal   Cardiovascular:  Cardiovascular Normal Exercise tolerance: good     Pulmonary:  Pulmonary Normal    Renal/:  Renal/ Normal  Left hydrocele   Hepatic/GI:   PUD, (asymptomatic ) IBS   Musculoskeletal:   Arthritis     Neurological:  Neurology Normal        Physical Exam  General:  Well nourished    Airway/Jaw/Neck:  Airway Findings: Mouth Opening: Normal Tongue: Normal  TM Distance: Normal, at least 6 cm  Jaw/Neck Findings:  Neck ROM: Normal ROM     Eyes/Ears/Nose:  EYES/EARS/NOSE FINDINGS: Normal   Dental:  Dental Findings: In tactCanker sore left lower lip     Chest/Lungs:  Chest/Lungs Findings: Clear to auscultation, Normal Respiratory Rate     Heart/Vascular:  Heart Findings: Rate: Normal  Rhythm: Regular Rhythm  Sounds: Normal        Mental Status:  Mental Status Findings:         Anesthesia Plan  Type of Anesthesia, risks & benefits discussed:  Anesthesia Type:  general  Patient's Preference:   Intra-op Monitoring Plan: standard ASA monitors  Intra-op Monitoring Plan Comments:   Post Op Pain Control Plan: multimodal analgesia and per primary service following discharge from PACU  Post Op Pain Control Plan Comments:   Induction:   IV  Beta Blocker:  Patient is not currently on a Beta-Blocker (No further  documentation required).       Informed Consent: Patient understands risks and agrees with Anesthesia plan.  Questions answered. Anesthesia consent signed with patient.  ASA Score: 2     Day of Surgery Review of History & Physical:        Anesthesia Plan Notes: GETA  Tylenol and Gabapentin preop  Zofran, Decadron ppx n/v        Ready For Surgery From Anesthesia Perspective.

## 2020-09-10 NOTE — INTERVAL H&P NOTE
The patient has been examined and the H&P has been reviewed:    I concur with the findings and no changes have occurred since H&P was written.    Surgery risks, benefits and alternative options discussed and understood by patient/family.          Active Hospital Problems    Diagnosis  POA    Impingement syndrome of right shoulder [M75.41]  Yes      Resolved Hospital Problems   No resolved problems to display.

## 2020-09-10 NOTE — DISCHARGE SUMMARY
The patient was admitted on 09/10/2020 under general anesthesia underwent arthroscopy the right shoulder the patient underwent acromioplasty and distal clavicle resection postoperatively did well he was discharged on Norco 10 mg q.4 hours as needed for pain and Phenergan 25 mg q.6 hours as needed for nausea he is to keep his arm in a sling and follow up in our office in 5 days keep the dressing intact.  And dry.

## 2020-09-10 NOTE — PLAN OF CARE
Dr sands at bedside pt still complaining of sever pain 9/10 precedex given by md and 5mg iv valium given

## 2020-09-10 NOTE — OP NOTE
[facility]  Orthopedic Surgery   Operative Note    SUMMARY     Date of Procedure: 9/10/2020     Procedure: Procedure(s) (LRB):  EXCISION, CLAVICLE, DISTAL (Right)  ACROMIOPLASTY, ARTHROSCOPIC (Right)       Surgeon(s) and Role:     * Ggaan Astorga MD - Primary    Assistant:  Merritt Corral    Pre-Operative Diagnosis: Impingement syndrome of right shoulder [M75.41] acromioclavicular arthritis right shoulder    Post-Operative Diagnosis: Post-Op Diagnosis Codes:     * Impingement syndrome of right shoulder [M75.41]  Acromioclavicular arthritis right shoulder  Anesthesia: General      Estimated Blood Loss (EBL): * No values recorded between 9/10/2020  9:15 AM and 9/10/2020 10:22 AM *           Implants: * No implants in log *    Specimens:   Specimen (12h ago, onward)    None           Complications:  None           Description of the Procedure:  The patient was given a general anesthetic and placed in the beach chair position arms and legs and neck were all well padded and protected patient was given IV Ancef prior to incision right shoulder was then prepped Hibiclens and ChloraPrep and draped in a sterile manner a posterior portal was made and the scope introduced into the shoulder joint inspection of the shoulder joint showed normal glenohumeral joint normal labrum the biceps tendon looked normal there was no evidence of any rotator cuff tear the subacromial space was then entered a lateral small lateral incision was made and a cannula introduced into the subacromial space inspection subacromial space showed a low-lying acromion the overlying prior periosteum on the anterior tip of the acromion was removed and then a bur was used to do an acromioplasty after the acromioplasty there appeared to be a much more generous subacromial space and no evidence of any impingement of the rotator cuff rotator cuff itself looked intact with no evidence of any tears the instruments were then removed then the  incisions were closed with 3 0 Vicryl subcuticular stitch Dermabond was applied to the wounds attention was turned to the lateral clavicle a longitudinal incision was made over the clavicle subperiosteal dissection was made around the lateral tip of the clavicle and then with a saw blade a 1 cm portion of clavicle was removed hemostasis was secured with the Bovie the periosteum was closed interrupted 0 Vicryl stitch 2 0 Vicryl was placed subcu in a 3 0 subcuticular Vicryl placed in the skin Dermabond was applied along the non adhering dressing the patient's arm was then placed in a sling and swath he was stable to recovery

## 2020-09-15 ENCOUNTER — TELEPHONE (OUTPATIENT)
Dept: ORTHOPEDICS | Facility: CLINIC | Age: 45
End: 2020-09-15

## 2020-09-15 NOTE — TELEPHONE ENCOUNTER
Called and left message to confirm appointment tomorrow with Dr. Astorga. Advised to contact office back should he need to reschedule.

## 2020-09-16 ENCOUNTER — TELEPHONE (OUTPATIENT)
Dept: ORTHOPEDICS | Facility: CLINIC | Age: 45
End: 2020-09-16

## 2020-09-16 ENCOUNTER — HOSPITAL ENCOUNTER (OUTPATIENT)
Dept: RADIOLOGY | Facility: HOSPITAL | Age: 45
Discharge: HOME | End: 2020-09-16
Attending: ORTHOPAEDIC SURGERY
Payer: COMMERCIAL

## 2020-09-16 ENCOUNTER — OFFICE VISIT (OUTPATIENT)
Dept: ORTHOPEDICS | Facility: CLINIC | Age: 45
End: 2020-09-16
Payer: COMMERCIAL

## 2020-09-16 VITALS — WEIGHT: 156 LBS | HEIGHT: 66 IN | BODY MASS INDEX: 25.07 KG/M2

## 2020-09-16 DIAGNOSIS — M19.011 ARTHRITIS OF RIGHT ACROMIOCLAVICULAR JOINT: ICD-10-CM

## 2020-09-16 DIAGNOSIS — M75.41 IMPINGEMENT SYNDROME OF RIGHT SHOULDER: ICD-10-CM

## 2020-09-16 DIAGNOSIS — M75.41 IMPINGEMENT SYNDROME OF RIGHT SHOULDER: Primary | ICD-10-CM

## 2020-09-16 PROCEDURE — 73030 X-RAY EXAM OF SHOULDER: CPT | Mod: 26,RT,, | Performed by: RADIOLOGY

## 2020-09-16 PROCEDURE — 73030 X-RAY EXAM OF SHOULDER: CPT | Mod: TC,PN,RT

## 2020-09-16 PROCEDURE — 73030 XR SHOULDER COMPLETE 2 OR MORE VIEWS RIGHT: ICD-10-PCS | Mod: 26,RT,, | Performed by: RADIOLOGY

## 2020-09-16 PROCEDURE — 99024 POSTOP FOLLOW-UP VISIT: CPT | Mod: S$GLB,,, | Performed by: ORTHOPAEDIC SURGERY

## 2020-09-16 PROCEDURE — 99999 PR PBB SHADOW E&M-EST. PATIENT-LVL III: CPT | Mod: PBBFAC,,, | Performed by: ORTHOPAEDIC SURGERY

## 2020-09-16 PROCEDURE — 99024 PR POST-OP FOLLOW-UP VISIT: ICD-10-PCS | Mod: S$GLB,,, | Performed by: ORTHOPAEDIC SURGERY

## 2020-09-16 PROCEDURE — 99999 PR PBB SHADOW E&M-EST. PATIENT-LVL III: ICD-10-PCS | Mod: PBBFAC,,, | Performed by: ORTHOPAEDIC SURGERY

## 2020-09-16 NOTE — TELEPHONE ENCOUNTER
----- Message from Merritt Alcocer sent at 9/16/2020  2:37 PM CDT -----  Regarding: looking for the list of providers the Dr said you would provide  Contact: pt   call

## 2020-09-16 NOTE — PROGRESS NOTES
9/16/2020    Past Medical History:   Diagnosis Date    Colon polyp     Diverticulosis     Epidermoid cyst of testis     left    H. pylori infection     Hydrocele, left     IBS (irritable bowel syndrome)     Peptic ulcer disease     Shoulder pain, right        Past Surgical History:   Procedure Laterality Date    ADENOIDECTOMY      ARTHROSCOPIC ACROMIOPLASTY OF SHOULDER Right 9/10/2020    Procedure: ACROMIOPLASTY, ARTHROSCOPIC;  Surgeon: Gagan Astorga MD;  Location: [facility] OR;  Service: Orthopedics;  Laterality: Right;    COLONOSCOPY  06/2015    repeat in 5 years for surveillance    COLONOSCOPY N/A 6/4/2020    Procedure: COLONOSCOPY;  Surgeon: Sheila Adams MD;  Location: [facility] ENDO;  Service: Endoscopy;  Laterality: N/A;    DISTAL CLAVICLE EXCISION Right 9/10/2020    Procedure: EXCISION, CLAVICLE, DISTAL;  Surgeon: Gagan Astorga MD;  Location: [facility] OR;  Service: Orthopedics;  Laterality: Right;    ESOPHAGOGASTRODUODENOSCOPY N/A 6/4/2020    Procedure: EGD (ESOPHAGOGASTRODUODENOSCOPY);  Surgeon: Sheila Adams MD;  Location: [facility] ENDO;  Service: Endoscopy;  Laterality: N/A;    KNEE ARTHROSCOPY W/ DEBRIDEMENT  2012    right knee     TONSILLECTOMY      UPPER GASTROINTESTINAL ENDOSCOPY  05/2015    repeat in 8 weeks to reassess ulcer for healing       Current Outpatient Medications   Medication Sig    HYDROcodone-acetaminophen (NORCO)  mg per tablet Take 1 tablet by mouth every 6 (six) hours as needed for Pain.    pantoprazole (PROTONIX) 40 MG tablet Take 1 tablet (40 mg total) by mouth once daily.    promethazine (PHENERGAN) 25 MG tablet Take 1 tablet (25 mg total) by mouth every 8 (eight) hours as needed for Nausea.    fluticasone (FLONASE) 50 mcg/actuation nasal spray 1 spray (50 mcg total) by Each Nare route once daily.     No current facility-administered medications for this visit.        Review of patient's allergies indicates:   Allergen Reactions    Doxycycline         Family History   Problem Relation Age of Onset    Diabetes Mother     Heart disease Mother 50        CABG x3    Cancer Maternal Grandfather 64        colon cancer    Colon cancer Maternal Grandfather 65    Crohn's disease Neg Hx     Ulcerative colitis Neg Hx     Celiac disease Neg Hx        Social History     Socioeconomic History    Marital status:      Spouse name: Not on file    Number of children: Not on file    Years of education: Not on file    Highest education level: Not on file   Occupational History    Not on file   Social Needs    Financial resource strain: Not on file    Food insecurity     Worry: Not on file     Inability: Not on file    Transportation needs     Medical: Not on file     Non-medical: Not on file   Tobacco Use    Smoking status: Never Smoker    Smokeless tobacco: Never Used   Substance and Sexual Activity    Alcohol use: No    Drug use: No    Sexual activity: Not on file   Lifestyle    Physical activity     Days per week: Not on file     Minutes per session: Not on file    Stress: Not on file   Relationships    Social connections     Talks on phone: Not on file     Gets together: Not on file     Attends Orthodox service: Not on file     Active member of club or organization: Not on file     Attends meetings of clubs or organizations: Not on file     Relationship status: Not on file   Other Topics Concern    Not on file   Social History Narrative    Not on file       Chief Complaint:   Chief Complaint   Patient presents with    Right Shoulder - Pain, Post-op Evaluation     DOS: 09/10/2020 --  6 days S/P  DISTAL CLAVICLE Excision,  ACROMIOPLASTY  ARTHROSCOPIC Right Shoulder. Patient in sling and doing well. PL 3/10         History of present illness:    This is a 45 y.o. year old male who complains of patient is now 5 days status post distal clavicle resection and acromioplasty states that his shoulder is feeling better his pain level at times is  "3/10    Review of Systems:    Constitution: Denies chills, fever, and sweats.  HENT: Denies headaches or blurry vision.  Cardiovascular: Denies chest pain or irregular heart beat.  Respiratory: Denies cough or shortness of breath.  Gastrointestinal: Denies abdominal pain, nausea, or vomiting.  Musculoskeletal:  Denies muscle cramps.  Neurological: Denies dizziness or focal weakness.  Psychiatric/Behavioral: Normal mental status.  Hematologic/Lymphatic: Denies bleeding problem or easy bruising/bleeding.  Skin: Denies rash or suspicious lesions.    Examination:    Vital Signs:    Vitals:    09/16/20 1257   Weight: 70.8 kg (156 lb)   Height: 5' 6" (1.676 m)   PainSc:   3   PainLoc: Shoulder       Body mass index is 25.18 kg/m².    This a well-developed, well nourished patient in no acute distress.    Alert and oriented x 3 and cooperative to examination.       Physical Exam:  Right shoulder-patient is incision is healing well  Imaging:  X-ray of the right shoulder shows distal clavicle resection and acromioplasty       Assessment: Impingement syndrome of right shoulder    Arthritis of right acromioclavicular joint        Plan:  Patient can start active assisted range of motion of the right shoulder will send the therapy from for some passive range of motion followed by active assisted range of motion      DISCLAIMER: This note may have been dictated using voice recognition software and may contain grammatical errors.     NOTE: Consult report sent to referring provider via [employer] EMR.    "

## 2020-09-16 NOTE — TELEPHONE ENCOUNTER
Returned call. Patient called inquiring on status of PT.  PT contacted patient to schedule a few minutes prior to return phone call from our office. Concern addressed prior to return call. =)

## 2020-09-21 ENCOUNTER — CLINICAL SUPPORT (OUTPATIENT)
Dept: REHABILITATION | Facility: HOSPITAL | Age: 45
End: 2020-09-21
Payer: COMMERCIAL

## 2020-09-21 DIAGNOSIS — M19.011 ARTHRITIS OF RIGHT ACROMIOCLAVICULAR JOINT: ICD-10-CM

## 2020-09-21 DIAGNOSIS — M75.41 IMPINGEMENT SYNDROME OF RIGHT SHOULDER: ICD-10-CM

## 2020-09-21 NOTE — PLAN OF CARE
[facility]  Physical Therapy Initial Evaluation    Name: Rios Tiwari [facility] Number: [id number]    Therapy Diagnosis:   Encounter Diagnoses   Name Primary?    Impingement syndrome of right shoulder     Arthritis of right acromioclavicular joint      Physician: Gagan Astorga MD    Physician Orders: PT Eval and Treat  Medical Diagnosis from Referral:   M75.41 (ICD-10-CM) - Impingement syndrome of right shoulder   M19.011 (ICD-10-CM) - Arthritis of right acromioclavicular joint       Evaluation Date: 9/21/2020  Authorization Period Expiration: 12/31/2020  Plan of Care Expiration: 12/31/2020  Visit # / Visits authorized: 1/ 20    Time In: 1315  Time Out: 1400  Total Billable Time: 45 minutes    DOS: 09/10/2020  S/p: EXCISION, CLAVICLE, DISTAL (Right)  ACROMIOPLASTY, ARTHROSCOPIC (Right)     Post-Op Precautions: 9/17/2020 note:   Patient can start active assisted range of motion of the right shoulder will send the therapy from for some passive range of motion followed by active assisted range of motion      Precautions: Standard    Subjective     Date of onset: 09/10/2020  History of current condition - Rios reports: chronic R shoulder pain causing pain and dysfunction with ADLs and workouts. The pt underwent surgery on 9/10 to improve status. The pt reports since then he has soreness in shoulder, inability to lay on shoulder, pain with AROM and raising it out to the side. No radiating pain or N+T. Reports mild lower neck/UT soreness.      Imaging na     Prior Therapy: none   Exercise Routine/Sport Participation: weight lifting with barbell and free weights 4x a week  Social History: Lives with his family.   Occupation: Contractor- more in management   Prior Level of Function: Restricted with pressing and pulling   Current Level of Function: Pain with the above.     Pain:  Current 2/10, worst 4/10, best 0/10   Location: right superior shoulder at AC joint   Description: Aching  and sore  Aggravating Factors: Laying, Night Time, Flexing and Lifting  Easing Factors: nothing and rest    Pts goals: improve pain and return to workouts/ full function       Medical History:   Past Medical History:   Diagnosis Date    Colon polyp     Diverticulosis     Epidermoid cyst of testis     left    H. pylori infection     Hydrocele, left     IBS (irritable bowel syndrome)     Peptic ulcer disease     Shoulder pain, right        Surgical History:   Rios Buck  has a past surgical history that includes Tonsillectomy; Upper gastrointestinal endoscopy (05/2015); Colonoscopy (06/2015); Adenoidectomy; Knee arthroscopy w/ debridement (2012); Esophagogastroduodenoscopy (N/A, 6/4/2020); Colonoscopy (N/A, 6/4/2020); Distal clavicle excision (Right, 9/10/2020); and Arthroscopic acromioplasty of shoulder (Right, 9/10/2020).    Medications:   Rios has a current medication list which includes the following prescription(s): fluticasone propionate, hydrocodone-acetaminophen, pantoprazole, and promethazine.    Allergies:   Review of patient's allergies indicates:   Allergen Reactions    Doxycycline         Objective     Posture: mild downward rotation and winging of B scap       Shoulder Elevation: 150 deg with increased scap elevation       Shoulder Active Range of Motion:   Shoulder Right Left   Flexion   150 180   ER at 0   70 80   Behind the back Reach   NT NT      Shoulder Passive Range of Motion:   Shoulder Right Left   Flexion   180 *pain  180   ER at 0   80 80   ER at 90   90 * pain 90   IR   80 *pain 90     Cervical Range of Motion:    % Observation Pain   Flexion 100 n n     Extension 75 Hinge lower  n     Right Rotation 75 n n      Left Rotation 75 n n       Seated Thoracolumbar Range of Motion:    % Observation Pain   Flexion 100 na n   Extension NT na n   Right Rotation 75 na n   Left Rotation 75 na n   Right Sidebend 100 na n   Left Sidebend 100 na n       Strength:   Right Left   Scaption  4/5 5/5   Shoulder ER at side 4/5 5/5   Shoulder ER at 90-90 4/5 5/5   Shoulder IR at side  4/5 5/5   Deltoid 4/5 5/5   Mid Trap  4/5 5/5         Special Tests: none warranted 2/2 to post op status   Joint Mobility: normal Post and inf GHJ     Palpation: ttp at AC joint R    Flexibility:   Post Cuff: R + ; L -   Lat: R + ; L -   Pec Minor: R + ; L -        CMS Impairment/Limitation/Restriction for FOTO Shoulder Survey    Therapist reviewed FOTO scores for Rios Buck on 9/21/2020.   FOTO documents entered into [employer] - see Media section.    Limitation Score: see media %  Category: Mobility       Treatment     Treatment Time In: 1330  Treatment Time Out: 1345  Total Treatment time separate from Evaluation: 15 minutes    Rios received therapeutic exercises to develop strength and ROM for 15 minutes including:  AAROM dowel flexion supine 15x   Supine shoulder flexion 15x   Sidelying ABD 15x  Sidelying ER 15x   Wall slide to Y 15x   Slot Machine 15x   ABCs on wall 2x     To add next visit:   UBE completed for 3/3 min forward & backward to increase ROM, endurance and decrease pain to improve tolerance to ADLs and age related activities.    Scaption   Weighted ER   Push-ups on wall   Plank mod on table   Flexion supine AROM 1#       Home Exercises and Patient Education Provided     Education provided:   - Cont to avoid heavy loaded weight lifting until progressed   - Prognosis, activity modification, goals for therapy, role of therapy for care, exercises/HEP    Written Home Exercises Provided: yes.  Exercises were reviewed and Rios was able to demonstrate them prior to the end of the session.   Pt received a written copy of exercises to perform at home. Rios demonstrated good  understanding of the education provided.     See EMR under patient instructions for exercises given.     Assessment     Rios is a 45 y.o. male referred to outpatient Physical Therapy 11 days s/p AC excision of R shoulder. The pt presents with  good PROM but impairments in AROM, R shoulder strength, altered scap upward rotator motor control decreasing tolerance to ADLs and IADLs. Pt will benefit from skilled outpatient Physical Therapy to address the deficits stated above and in the chart below, provide pt/family education, and to maximize pt's level of independence. Pt prognosis is Excellent.     Plan of care discussed with patient: Yes  Pt's spiritual, cultural and educational needs considered and patient is agreeable to the plan of care and goals as stated below:       Anticipated Barriers for therapy: None       Medical Necessity is demonstrated by the following  History  Co-morbidities and personal factors that may impact the plan of care Co-morbidities:   none    Personal Factors:   no deficits     low   Examination  Body Structures and Functions, activity limitations and participation restrictions that may impact the plan of care Body Regions:   upper extremities  trunk    Body Systems:    gross symmetry  ROM  strength  transfers  transitions  motor control  motor learning    Participation Restrictions:   none    Activity limitations:   Learning and applying knowledge  No deficit    General Tasks and Commands  No deficit    Communication  No deficit    Mobility  lifting and carrying objects  fine hand use (grasping/picking up)    Self care  Sleeping     Domestic Life  No deficit    Interactions/Relationships  No deficit    Life Areas  Recreational activities to improve health and wellness     Community and Social Life  No deficit          moderate   Clinical Presentation stable and uncomplicated low   Decision Making/ Complexity Score: low     Goals:  Short Term Goals: 6 weeks  1. Pt will be compliant with HEP 50% of prescribed amount.   2. The pt to demo improvement in R shoulder AROM to by 80% of the L  3.  The pt to demo tolerance to being out of the sling for 24 hours with pain <2/10     Long Term Goals: 24 weeks   Pt will be compliant with HEP  100% of prescribed amount.   The pt to  Demo at least 4+/5 of RTC muscle testing to demo improvement in tolerance to activity  The pt to tolerate lifting 10# overhead to improve tolerance to ADLs and work related activities   The pt will report full participation in ADLs and IADLs without restrictions related to R Shoulder.    Plan   Plan of care Certification: 9/21/2020 to 12/31/2020.    Outpatient Physical Therapy 2 times weekly for 13 weeks to include the following interventions: Manual Therapy, Moist Heat/ Ice, Neuromuscular Re-ed, Patient Education, Therapeutic Activites and Therapeutic Exercise.     Moni Mohan, PT , DPT, SCS, FAAMOMPT

## 2020-09-25 ENCOUNTER — OFFICE VISIT (OUTPATIENT)
Dept: ORTHOPEDICS | Facility: CLINIC | Age: 45
End: 2020-09-25
Payer: COMMERCIAL

## 2020-09-25 VITALS — WEIGHT: 156 LBS | HEIGHT: 66 IN | BODY MASS INDEX: 25.07 KG/M2 | RESPIRATION RATE: 16 BRPM

## 2020-09-25 DIAGNOSIS — M25.511 RIGHT SHOULDER PAIN, UNSPECIFIED CHRONICITY: ICD-10-CM

## 2020-09-25 DIAGNOSIS — M75.41 IMPINGEMENT SYNDROME OF RIGHT SHOULDER: Primary | ICD-10-CM

## 2020-09-25 PROCEDURE — 99024 POSTOP FOLLOW-UP VISIT: CPT | Mod: S$GLB,,, | Performed by: ORTHOPAEDIC SURGERY

## 2020-09-25 PROCEDURE — 99999 PR PBB SHADOW E&M-EST. PATIENT-LVL III: CPT | Mod: PBBFAC,,, | Performed by: ORTHOPAEDIC SURGERY

## 2020-09-25 PROCEDURE — 99999 PR PBB SHADOW E&M-EST. PATIENT-LVL III: ICD-10-PCS | Mod: PBBFAC,,, | Performed by: ORTHOPAEDIC SURGERY

## 2020-09-25 PROCEDURE — 99024 PR POST-OP FOLLOW-UP VISIT: ICD-10-PCS | Mod: S$GLB,,, | Performed by: ORTHOPAEDIC SURGERY

## 2020-09-25 NOTE — PROGRESS NOTES
9/25/2020    Past Medical History:   Diagnosis Date    Colon polyp     Diverticulosis     Epidermoid cyst of testis     left    H. pylori infection     Hydrocele, left     IBS (irritable bowel syndrome)     Peptic ulcer disease     Shoulder pain, right        Past Surgical History:   Procedure Laterality Date    ADENOIDECTOMY      ARTHROSCOPIC ACROMIOPLASTY OF SHOULDER Right 9/10/2020    Procedure: ACROMIOPLASTY, ARTHROSCOPIC;  Surgeon: Gagan Astorga MD;  Location: [facility] OR;  Service: Orthopedics;  Laterality: Right;    COLONOSCOPY  06/2015    repeat in 5 years for surveillance    COLONOSCOPY N/A 6/4/2020    Procedure: COLONOSCOPY;  Surgeon: Sheila Adams MD;  Location: [facility] ENDO;  Service: Endoscopy;  Laterality: N/A;    DISTAL CLAVICLE EXCISION Right 9/10/2020    Procedure: EXCISION, CLAVICLE, DISTAL;  Surgeon: Gagan Astorga MD;  Location: [facility] OR;  Service: Orthopedics;  Laterality: Right;    ESOPHAGOGASTRODUODENOSCOPY N/A 6/4/2020    Procedure: EGD (ESOPHAGOGASTRODUODENOSCOPY);  Surgeon: Sheila Adams MD;  Location: [facility] ENDO;  Service: Endoscopy;  Laterality: N/A;    KNEE ARTHROSCOPY W/ DEBRIDEMENT  2012    right knee     TONSILLECTOMY      UPPER GASTROINTESTINAL ENDOSCOPY  05/2015    repeat in 8 weeks to reassess ulcer for healing       Current Outpatient Medications   Medication Sig    fluticasone (FLONASE) 50 mcg/actuation nasal spray 1 spray (50 mcg total) by Each Nare route once daily.    HYDROcodone-acetaminophen (NORCO)  mg per tablet Take 1 tablet by mouth every 6 (six) hours as needed for Pain.    pantoprazole (PROTONIX) 40 MG tablet Take 1 tablet (40 mg total) by mouth once daily.    promethazine (PHENERGAN) 25 MG tablet Take 1 tablet (25 mg total) by mouth every 8 (eight) hours as needed for Nausea.     No current facility-administered medications for this visit.        Review of patient's allergies indicates:   Allergen Reactions    Doxycycline         Family History   Problem Relation Age of Onset    Diabetes Mother     Heart disease Mother 50        CABG x3    Cancer Maternal Grandfather 64        colon cancer    Colon cancer Maternal Grandfather 65    Crohn's disease Neg Hx     Ulcerative colitis Neg Hx     Celiac disease Neg Hx        Social History     Socioeconomic History    Marital status:      Spouse name: Not on file    Number of children: Not on file    Years of education: Not on file    Highest education level: Not on file   Occupational History    Not on file   Social Needs    Financial resource strain: Not on file    Food insecurity     Worry: Not on file     Inability: Not on file    Transportation needs     Medical: Not on file     Non-medical: Not on file   Tobacco Use    Smoking status: Never Smoker    Smokeless tobacco: Never Used   Substance and Sexual Activity    Alcohol use: No    Drug use: No    Sexual activity: Not on file   Lifestyle    Physical activity     Days per week: Not on file     Minutes per session: Not on file    Stress: Not on file   Relationships    Social connections     Talks on phone: Not on file     Gets together: Not on file     Attends Faith service: Not on file     Active member of club or organization: Not on file     Attends meetings of clubs or organizations: Not on file     Relationship status: Not on file   Other Topics Concern    Not on file   Social History Narrative    Not on file       Chief Complaint:   Chief Complaint   Patient presents with    Right Shoulder - Post-op Evaluation     DOS: 09/10/2020 -- 2 weeks 1 day s/p DISTAL CLAVICLE Excision,  ACROMIOPLASTY  ARTHROSCOPIC Right Shoulder         History of present illness:    This is a 45 y.o. year old male who complains of patient is now 2 weeks status post arthroscopy to the right shoulder with acromioplasty and distal clavicle resection    Review of Systems:    Constitution: Denies chills, fever, and  "sweats.  HENT: Denies headaches or blurry vision.  Cardiovascular: Denies chest pain or irregular heart beat.  Respiratory: Denies cough or shortness of breath.  Gastrointestinal: Denies abdominal pain, nausea, or vomiting.  Musculoskeletal:  Denies muscle cramps.  Neurological: Denies dizziness or focal weakness.  Psychiatric/Behavioral: Normal mental status.  Hematologic/Lymphatic: Denies bleeding problem or easy bruising/bleeding.  Skin: Denies rash or suspicious lesions.    Examination:    Vital Signs:    Vitals:    09/25/20 1220   Weight: 70.8 kg (156 lb)   Height: 5' 6" (1.676 m)       Body mass index is 25.18 kg/m².    This a well-developed, well nourished patient in no acute distress.    Alert and oriented x 3 and cooperative to examination.       Physical Exam:  Right shoulder-patient has good range of motion actively and passively of the right shoulder his incision is healing well he still has some discomfort over the lateral deltoid area    Imaging:  No x-rays today but previous x-rays postoperatively show resection of the distal clavicle laterally       Assessment: Impingement syndrome of right shoulder    Right shoulder pain, unspecified chronicity        Plan:  Patient can increase his range of motion at physical therapy I would refrain from any heavy lifting for least the next 2-3 weeks and then slowly increase his activity as tolerated will see him back in 6 weeks      DISCLAIMER: This note may have been dictated using voice recognition software and may contain grammatical errors.     NOTE: Consult report sent to referring provider via [employer] EMR.    "

## 2020-10-02 ENCOUNTER — CLINICAL SUPPORT (OUTPATIENT)
Dept: REHABILITATION | Facility: HOSPITAL | Age: 45
End: 2020-10-02
Payer: COMMERCIAL

## 2020-10-02 DIAGNOSIS — R29.898 WEAKNESS OF SHOULDER: Primary | ICD-10-CM

## 2020-10-02 PROCEDURE — 97112 NEUROMUSCULAR REEDUCATION: CPT

## 2020-10-02 PROCEDURE — 97110 THERAPEUTIC EXERCISES: CPT

## 2020-10-02 PROCEDURE — 97530 THERAPEUTIC ACTIVITIES: CPT

## 2020-10-02 NOTE — PROGRESS NOTES
Physical Therapy Daily Treatment Note     Name: Rios Tiwari [facility] Number: [id number]    Therapy Diagnosis: No diagnosis found.  Physician: Gagan Astorga MD    Visit Date: 10/2/2020  Physician Orders: PT Eval and Treat  Medical Diagnosis from Referral:   M75.41 (ICD-10-CM) - Impingement syndrome of right shoulder   M19.011 (ICD-10-CM) - Arthritis of right acromioclavicular joint       Evaluation Date: 9/21/2020  Authorization Period Expiration: 12/31/2020  Plan of Care Expiration: 12/31/2020  Visit # / Visits authorized: 2/ 20     Time In: 1400  Time Out: 1500  Total Billable Time: 45 minutes     DOS: 09/10/2020  S/p: EXCISION, CLAVICLE, DISTAL (Right)  ACROMIOPLASTY, ARTHROSCOPIC (Right)     Post-Op Precautions: 9/17/2020 note:   Patient can start active assisted range of motion of the right shoulder will send the therapy from for some passive range of motion followed by active assisted range of motion        Precautions: Standard      Subjective     Pt reports: His shoulder is doing OK after the IE, has good motion .    He was compliant with home exercise program.  Response to previous treatment: improvement in ROM   Functional change: no sig change     Pain: 0/10  Location: na     Objective     Daily Measurements:     Shoulder Active Range of Motion:   Shoulder Right Left   Flexion   170 180   ER at 0   50 60   Behind the back Reach   NT NT   Scapula Upward Rotation 40 50      Shoulder Passive Range of Motion:   Shoulder Right Left   Flexion   180 180   ER at 0   60 60   ER at 90   90 90   IR   90 90             Daily Treatment     Rios received therapeutic exercises to develop strength, endurance and ROM for 15 minutes including:  UBE completed for 5/5 min forward & backward to increase ROM, endurance and decrease pain to improve tolerance to ADLs and age related activities.   Scaption 2# 5x5x5 R/L/B 3x        Rios received the following manual therapy techniques: Joint mobilizations were  applied to the: R shoulder for 02 minutes, including:  ROM Check     Rios participated in dynamic functional therapeutic activities to improve functional performance for 13  minutes, including:  Bicep curls 7# 2x20   Plank on ground 4x20s    Rios participated in neuromuscular re-education activities to improve: Posture and motor control for 15 minutes. The following activities were included:  Sidelying ER 3# 4x10  Wall slide to Y 3x15   ABCs on wall 5x weighted yellow ball     Home Exercises and Patient Education Provided     Education provided:   - focus on low weight, high reps     Written Home Exercises Provided: Patient instructed to cont prior HEP.  Exercises were reviewed and Rios was able to demonstrate them prior to the end of the session.  Rios demonstrated good  understanding of the education provided.     See EMR under patient instructions for exercises given.     Assessment     The pt with excellent tolerance to progression of exercises to add WB and load to the movements. The pt advised to complete movements at home. Will progress to modified push-ups, bench/floor press, rev flys next tx session sx pending.     Rios is progressing well towards his goals.     Pt will continue to benefit from skilled outpatient physical therapy to address the deficits listed in the problem list box on initial evaluation, provide pt/family education and to maximize pt's level of independence in the home and community environment. Pt prognosis is Good.     Pt's spiritual, cultural and educational needs considered and pt agreeable to plan of care and goals.    Anticipated barriers to physical therapy: None    Goals:  1. Short Term Goals: 6 weeks  2. 1. Pt will be compliant with HEP 50% of prescribed amount.   3. 2. The pt to demo improvement in R shoulder AROM to by 80% of the L  4. 3.  The pt to demo tolerance to being out of the sling for 24 hours with pain <2/10     Long Term Goals: 24 weeks   1. Pt will be compliant with  % of prescribed amount.   2. The pt to  Demo at least 4+/5 of RTC muscle testing to demo improvement in tolerance to activity  3. The pt to tolerate lifting 10# overhead to improve tolerance to ADLs and work related activities   4. The pt will report full participation in ADLs and IADLs without restrictions related to R Shoulder.    Plan     Cont to prog as traci.     Moni Mohan, PT , DPT, SCS, FAAOMPT

## 2020-10-19 ENCOUNTER — PATIENT MESSAGE (OUTPATIENT)
Dept: REHABILITATION | Facility: HOSPITAL | Age: 45
End: 2020-10-19

## 2020-10-21 NOTE — ANESTHESIA PREPROCEDURE EVALUATION
05/23/2017  Rios Buck is a 41 y.o., male.    Anesthesia Evaluation    I have reviewed the Patient Summary Reports.    I have reviewed the Nursing Notes.   I have reviewed the Medications.     Review of Systems  Anesthesia Hx:  No problems with previous Anesthesia    Social:  Non-Smoker    Hepatic/GI:   PUD,        Physical Exam  General:  Well nourished    Airway/Jaw/Neck:  Airway Findings: Mouth Opening: Normal Tongue: Normal  General Airway Assessment: Adult, Good  Mallampati: I  TM Distance: 4 - 6 cm       Chest/Lungs:  Chest/Lungs Findings: Clear to auscultation, Normal Respiratory Rate     Heart/Vascular:  Heart Findings: Rate: Normal  Rhythm: Regular Rhythm  Sounds: Normal        Mental Status:  Mental Status Findings:  Alert and Oriented, Cooperative         Anesthesia Plan  Type of Anesthesia, risks & benefits discussed:  Anesthesia Type:  general  Patient's Preference:   Intra-op Monitoring Plan:   Intra-op Monitoring Plan Comments:   Post Op Pain Control Plan:   Post Op Pain Control Plan Comments:   Induction:   IV  Beta Blocker:  Patient is not currently on a Beta-Blocker (No further documentation required).       Informed Consent: Patient understands risks and agrees with Anesthesia plan.  Questions answered. Anesthesia consent signed with patient.  ASA Score: 1     Day of Surgery Review of History & Physical: I have interviewed and examined the patient. I have reviewed the patient's H&P dated:  There are no significant changes.          Ready For Surgery From Anesthesia Perspective.       
yes

## 2021-03-02 DIAGNOSIS — M23.329: Primary | ICD-10-CM

## 2021-03-03 ENCOUNTER — ANESTHESIA EVENT (OUTPATIENT)
Dept: SURGERY | Facility: OTHER | Age: 46
End: 2021-03-03
Payer: COMMERCIAL

## 2021-03-03 ENCOUNTER — HOSPITAL ENCOUNTER (OUTPATIENT)
Dept: PREADMISSION TESTING | Facility: OTHER | Age: 46
Discharge: HOME | End: 2021-03-03
Attending: ANESTHESIOLOGY
Payer: COMMERCIAL

## 2021-03-03 VITALS
RESPIRATION RATE: 16 BRPM | DIASTOLIC BLOOD PRESSURE: 64 MMHG | WEIGHT: 150 LBS | TEMPERATURE: 97 F | OXYGEN SATURATION: 98 % | HEART RATE: 68 BPM | SYSTOLIC BLOOD PRESSURE: 116 MMHG | BODY MASS INDEX: 24.11 KG/M2 | HEIGHT: 66 IN

## 2021-03-03 DIAGNOSIS — Z01.818 PRE-OP TESTING: ICD-10-CM

## 2021-03-03 LAB — SARS-COV-2 RDRP RESP QL NAA+PROBE: NEGATIVE

## 2021-03-03 PROCEDURE — U0002 COVID-19 LAB TEST NON-CDC: HCPCS

## 2021-03-03 RX ORDER — FAMOTIDINE 20 MG/1
20 TABLET, FILM COATED ORAL
Status: CANCELLED | OUTPATIENT
Start: 2021-03-03 | End: 2021-03-03

## 2021-03-03 RX ORDER — IBUPROFEN 200 MG
400 TABLET ORAL EVERY 6 HOURS PRN
COMMUNITY
End: 2021-03-23 | Stop reason: CLARIF

## 2021-03-03 RX ORDER — ACETAMINOPHEN 500 MG
1000 TABLET ORAL EVERY 6 HOURS PRN
COMMUNITY

## 2021-03-03 RX ORDER — ACETAMINOPHEN 500 MG
1000 TABLET ORAL
Status: CANCELLED | OUTPATIENT
Start: 2021-03-03 | End: 2021-03-03

## 2021-03-03 RX ORDER — LIDOCAINE HYDROCHLORIDE 10 MG/ML
0.5 INJECTION, SOLUTION EPIDURAL; INFILTRATION; INTRACAUDAL; PERINEURAL ONCE
Status: CANCELLED | OUTPATIENT
Start: 2021-03-03 | End: 2021-03-03

## 2021-03-03 RX ORDER — SODIUM CHLORIDE, SODIUM LACTATE, POTASSIUM CHLORIDE, CALCIUM CHLORIDE 600; 310; 30; 20 MG/100ML; MG/100ML; MG/100ML; MG/100ML
INJECTION, SOLUTION INTRAVENOUS CONTINUOUS
Status: CANCELLED | OUTPATIENT
Start: 2021-03-03

## 2021-03-05 ENCOUNTER — HOSPITAL ENCOUNTER (OUTPATIENT)
Facility: OTHER | Age: 46
Discharge: HOME | End: 2021-03-05
Attending: ORTHOPAEDIC SURGERY | Admitting: ORTHOPAEDIC SURGERY
Payer: COMMERCIAL

## 2021-03-05 ENCOUNTER — ANESTHESIA (OUTPATIENT)
Dept: SURGERY | Facility: OTHER | Age: 46
End: 2021-03-05
Payer: COMMERCIAL

## 2021-03-05 VITALS
HEART RATE: 85 BPM | WEIGHT: 150 LBS | HEIGHT: 66 IN | RESPIRATION RATE: 18 BRPM | BODY MASS INDEX: 24.11 KG/M2 | OXYGEN SATURATION: 97 % | TEMPERATURE: 99 F | SYSTOLIC BLOOD PRESSURE: 110 MMHG | DIASTOLIC BLOOD PRESSURE: 65 MMHG

## 2021-03-05 DIAGNOSIS — Z01.818 PRE-OP TESTING: ICD-10-CM

## 2021-03-05 DIAGNOSIS — M23.322 DERANGEMENT OF POSTERIOR HORN OF MEDIAL MENISCUS OF LEFT KNEE: Primary | ICD-10-CM

## 2021-03-05 PROBLEM — M23.329 DERANGEMENT OF POSTERIOR HORN OF MEDIAL MENISCUS: Status: ACTIVE | Noted: 2021-03-05

## 2021-03-05 PROCEDURE — 71000015 HC POSTOP RECOV 1ST HR: Performed by: ORTHOPAEDIC SURGERY

## 2021-03-05 PROCEDURE — 63600175 PHARM REV CODE 636 W HCPCS: Performed by: SPECIALIST

## 2021-03-05 PROCEDURE — 63600175 PHARM REV CODE 636 W HCPCS: Performed by: ANESTHESIOLOGY

## 2021-03-05 PROCEDURE — 63600175 PHARM REV CODE 636 W HCPCS: Performed by: ORTHOPAEDIC SURGERY

## 2021-03-05 PROCEDURE — 71000033 HC RECOVERY, INTIAL HOUR: Performed by: ORTHOPAEDIC SURGERY

## 2021-03-05 PROCEDURE — 37000009 HC ANESTHESIA EA ADD 15 MINS: Performed by: ORTHOPAEDIC SURGERY

## 2021-03-05 PROCEDURE — 25000003 PHARM REV CODE 250: Performed by: ORTHOPAEDIC SURGERY

## 2021-03-05 PROCEDURE — 25000003 PHARM REV CODE 250: Performed by: SPECIALIST

## 2021-03-05 PROCEDURE — 25000003 PHARM REV CODE 250: Performed by: NURSE ANESTHETIST, CERTIFIED REGISTERED

## 2021-03-05 PROCEDURE — 36000711: Performed by: ORTHOPAEDIC SURGERY

## 2021-03-05 PROCEDURE — 37000008 HC ANESTHESIA 1ST 15 MINUTES: Performed by: ORTHOPAEDIC SURGERY

## 2021-03-05 PROCEDURE — 71000039 HC RECOVERY, EACH ADD'L HOUR: Performed by: ORTHOPAEDIC SURGERY

## 2021-03-05 PROCEDURE — 63600175 PHARM REV CODE 636 W HCPCS: Performed by: NURSE ANESTHETIST, CERTIFIED REGISTERED

## 2021-03-05 PROCEDURE — 71000016 HC POSTOP RECOV ADDL HR: Performed by: ORTHOPAEDIC SURGERY

## 2021-03-05 PROCEDURE — 36000710: Performed by: ORTHOPAEDIC SURGERY

## 2021-03-05 PROCEDURE — 27201423 OPTIME MED/SURG SUP & DEVICES STERILE SUPPLY: Performed by: ORTHOPAEDIC SURGERY

## 2021-03-05 PROCEDURE — 25000003 PHARM REV CODE 250: Performed by: ANESTHESIOLOGY

## 2021-03-05 RX ORDER — PROPOFOL 10 MG/ML
VIAL (ML) INTRAVENOUS
Status: DISCONTINUED | OUTPATIENT
Start: 2021-03-05 | End: 2021-03-05

## 2021-03-05 RX ORDER — NAPROXEN SODIUM 220 MG/1
81 TABLET, FILM COATED ORAL 2 TIMES DAILY
Qty: 60 TABLET | Refills: 0 | Status: SHIPPED | OUTPATIENT
Start: 2021-03-05 | End: 2023-09-12

## 2021-03-05 RX ORDER — ONDANSETRON 2 MG/ML
INJECTION INTRAMUSCULAR; INTRAVENOUS
Status: DISCONTINUED | OUTPATIENT
Start: 2021-03-05 | End: 2021-03-05

## 2021-03-05 RX ORDER — MUPIROCIN 20 MG/G
OINTMENT TOPICAL
Status: DISCONTINUED | OUTPATIENT
Start: 2021-03-05 | End: 2021-03-05 | Stop reason: HOSPADM

## 2021-03-05 RX ORDER — ONDANSETRON 4 MG/1
8 TABLET, ORALLY DISINTEGRATING ORAL EVERY 8 HOURS PRN
Qty: 15 TABLET | Refills: 0 | Status: SHIPPED | OUTPATIENT
Start: 2021-03-05 | End: 2021-03-23 | Stop reason: CLARIF

## 2021-03-05 RX ORDER — ACETAMINOPHEN 500 MG
1000 TABLET ORAL
Status: COMPLETED | OUTPATIENT
Start: 2021-03-05 | End: 2021-03-05

## 2021-03-05 RX ORDER — EPINEPHRINE 1 MG/ML
INJECTION, SOLUTION INTRACARDIAC; INTRAMUSCULAR; INTRAVENOUS; SUBCUTANEOUS
Status: DISCONTINUED | OUTPATIENT
Start: 2021-03-05 | End: 2021-03-05 | Stop reason: HOSPADM

## 2021-03-05 RX ORDER — DIPHENHYDRAMINE HYDROCHLORIDE 50 MG/ML
25 INJECTION INTRAMUSCULAR; INTRAVENOUS EVERY 6 HOURS PRN
Status: DISCONTINUED | OUTPATIENT
Start: 2021-03-05 | End: 2021-03-05 | Stop reason: HOSPADM

## 2021-03-05 RX ORDER — OXYCODONE HYDROCHLORIDE 5 MG/1
5 TABLET ORAL
Status: DISCONTINUED | OUTPATIENT
Start: 2021-03-05 | End: 2021-03-05 | Stop reason: HOSPADM

## 2021-03-05 RX ORDER — ONDANSETRON 2 MG/ML
4 INJECTION INTRAMUSCULAR; INTRAVENOUS DAILY PRN
Status: DISCONTINUED | OUTPATIENT
Start: 2021-03-05 | End: 2021-03-05 | Stop reason: HOSPADM

## 2021-03-05 RX ORDER — TRANEXAMIC ACID 100 MG/ML
1000 INJECTION, SOLUTION INTRAVENOUS
Status: DISCONTINUED | OUTPATIENT
Start: 2021-03-05 | End: 2021-03-05 | Stop reason: HOSPADM

## 2021-03-05 RX ORDER — SODIUM CHLORIDE 0.9 % (FLUSH) 0.9 %
3 SYRINGE (ML) INJECTION
Status: DISCONTINUED | OUTPATIENT
Start: 2021-03-05 | End: 2021-03-05 | Stop reason: HOSPADM

## 2021-03-05 RX ORDER — BUPIVACAINE HYDROCHLORIDE 5 MG/ML
INJECTION, SOLUTION EPIDURAL; INTRACAUDAL
Status: DISCONTINUED | OUTPATIENT
Start: 2021-03-05 | End: 2021-03-05 | Stop reason: HOSPADM

## 2021-03-05 RX ORDER — LIDOCAINE HYDROCHLORIDE 10 MG/ML
0.5 INJECTION, SOLUTION EPIDURAL; INFILTRATION; INTRACAUDAL; PERINEURAL ONCE
Status: DISCONTINUED | OUTPATIENT
Start: 2021-03-05 | End: 2021-03-05 | Stop reason: HOSPADM

## 2021-03-05 RX ORDER — MEPERIDINE HYDROCHLORIDE 25 MG/ML
12.5 INJECTION INTRAMUSCULAR; INTRAVENOUS; SUBCUTANEOUS ONCE AS NEEDED
Status: DISCONTINUED | OUTPATIENT
Start: 2021-03-05 | End: 2021-03-05 | Stop reason: HOSPADM

## 2021-03-05 RX ORDER — HYDROMORPHONE HYDROCHLORIDE 2 MG/ML
0.4 INJECTION, SOLUTION INTRAMUSCULAR; INTRAVENOUS; SUBCUTANEOUS EVERY 5 MIN PRN
Status: COMPLETED | OUTPATIENT
Start: 2021-03-05 | End: 2021-03-05

## 2021-03-05 RX ORDER — FENTANYL CITRATE 50 UG/ML
INJECTION, SOLUTION INTRAMUSCULAR; INTRAVENOUS
Status: DISCONTINUED | OUTPATIENT
Start: 2021-03-05 | End: 2021-03-05

## 2021-03-05 RX ORDER — HYDROCODONE BITARTRATE AND ACETAMINOPHEN 5; 325 MG/1; MG/1
1 TABLET ORAL EVERY 6 HOURS PRN
Qty: 30 TABLET | Refills: 0 | Status: SHIPPED | OUTPATIENT
Start: 2021-03-05 | End: 2021-03-23 | Stop reason: CLARIF

## 2021-03-05 RX ORDER — HYDROCODONE BITARTRATE AND ACETAMINOPHEN 5; 325 MG/1; MG/1
1 TABLET ORAL EVERY 4 HOURS PRN
Status: CANCELLED | OUTPATIENT
Start: 2021-03-05

## 2021-03-05 RX ORDER — CEFAZOLIN SODIUM 1 G/3ML
2 INJECTION, POWDER, FOR SOLUTION INTRAMUSCULAR; INTRAVENOUS
Status: COMPLETED | OUTPATIENT
Start: 2021-03-05 | End: 2021-03-05

## 2021-03-05 RX ORDER — CYCLOBENZAPRINE HCL 5 MG
10 TABLET ORAL ONCE
Status: COMPLETED | OUTPATIENT
Start: 2021-03-05 | End: 2021-03-05

## 2021-03-05 RX ORDER — MIDAZOLAM HYDROCHLORIDE 1 MG/ML
INJECTION INTRAMUSCULAR; INTRAVENOUS
Status: DISCONTINUED | OUTPATIENT
Start: 2021-03-05 | End: 2021-03-05

## 2021-03-05 RX ORDER — FAMOTIDINE 20 MG/1
20 TABLET, FILM COATED ORAL
Status: COMPLETED | OUTPATIENT
Start: 2021-03-05 | End: 2021-03-05

## 2021-03-05 RX ORDER — DEXAMETHASONE SODIUM PHOSPHATE 4 MG/ML
INJECTION, SOLUTION INTRA-ARTICULAR; INTRALESIONAL; INTRAMUSCULAR; INTRAVENOUS; SOFT TISSUE
Status: DISCONTINUED | OUTPATIENT
Start: 2021-03-05 | End: 2021-03-05

## 2021-03-05 RX ORDER — LIDOCAINE HCL/PF 100 MG/5ML
SYRINGE (ML) INTRAVENOUS
Status: DISCONTINUED | OUTPATIENT
Start: 2021-03-05 | End: 2021-03-05

## 2021-03-05 RX ORDER — LIDOCAINE HYDROCHLORIDE 10 MG/ML
INJECTION, SOLUTION EPIDURAL; INFILTRATION; INTRACAUDAL; PERINEURAL
Status: DISCONTINUED | OUTPATIENT
Start: 2021-03-05 | End: 2021-03-05 | Stop reason: HOSPADM

## 2021-03-05 RX ORDER — SODIUM CHLORIDE, SODIUM LACTATE, POTASSIUM CHLORIDE, CALCIUM CHLORIDE 600; 310; 30; 20 MG/100ML; MG/100ML; MG/100ML; MG/100ML
INJECTION, SOLUTION INTRAVENOUS CONTINUOUS
Status: DISCONTINUED | OUTPATIENT
Start: 2021-03-05 | End: 2021-03-05 | Stop reason: HOSPADM

## 2021-03-05 RX ADMIN — CYCLOBENZAPRINE HYDROCHLORIDE 10 MG: 5 TABLET, FILM COATED ORAL at 11:03

## 2021-03-05 RX ADMIN — ACETAMINOPHEN 1000 MG: 500 TABLET ORAL at 07:03

## 2021-03-05 RX ADMIN — DEXAMETHASONE SODIUM PHOSPHATE 8 MG: 4 INJECTION, SOLUTION INTRAMUSCULAR; INTRAVENOUS at 09:03

## 2021-03-05 RX ADMIN — LIDOCAINE HYDROCHLORIDE 100 MG: 20 INJECTION, SOLUTION INTRAVENOUS at 09:03

## 2021-03-05 RX ADMIN — ONDANSETRON HYDROCHLORIDE 4 MG: 2 INJECTION INTRAMUSCULAR; INTRAVENOUS at 09:03

## 2021-03-05 RX ADMIN — FAMOTIDINE 20 MG: 20 TABLET, FILM COATED ORAL at 07:03

## 2021-03-05 RX ADMIN — MIDAZOLAM HYDROCHLORIDE 2 MG: 1 INJECTION, SOLUTION INTRAMUSCULAR; INTRAVENOUS at 09:03

## 2021-03-05 RX ADMIN — SODIUM CHLORIDE, SODIUM LACTATE, POTASSIUM CHLORIDE, AND CALCIUM CHLORIDE: 600; 310; 30; 20 INJECTION, SOLUTION INTRAVENOUS at 08:03

## 2021-03-05 RX ADMIN — HYDROMORPHONE HYDROCHLORIDE 0.4 MG: 2 INJECTION INTRAMUSCULAR; INTRAVENOUS; SUBCUTANEOUS at 11:03

## 2021-03-05 RX ADMIN — IBUPROFEN 800 MG: 800 INJECTION INTRAVENOUS at 11:03

## 2021-03-05 RX ADMIN — HYDROMORPHONE HYDROCHLORIDE 0.4 MG: 2 INJECTION INTRAMUSCULAR; INTRAVENOUS; SUBCUTANEOUS at 10:03

## 2021-03-05 RX ADMIN — PROPOFOL 200 MG: 10 INJECTION, EMULSION INTRAVENOUS at 09:03

## 2021-03-05 RX ADMIN — CEFAZOLIN 2 G: 330 INJECTION, POWDER, FOR SOLUTION INTRAMUSCULAR; INTRAVENOUS at 09:03

## 2021-03-05 RX ADMIN — FENTANYL CITRATE 150 MCG: 50 INJECTION, SOLUTION INTRAMUSCULAR; INTRAVENOUS at 09:03

## 2021-03-05 RX ADMIN — GLYCOPYRROLATE 0.2 MG: 0.2 INJECTION, SOLUTION INTRAMUSCULAR; INTRAVITREAL at 09:03

## 2021-03-05 RX ADMIN — MUPIROCIN: 20 OINTMENT TOPICAL at 07:03

## 2021-03-23 ENCOUNTER — HOSPITAL ENCOUNTER (OUTPATIENT)
Dept: PREADMISSION TESTING | Facility: OTHER | Age: 46
Discharge: HOME | End: 2021-03-23
Attending: ORTHOPAEDIC SURGERY
Payer: COMMERCIAL

## 2021-03-23 ENCOUNTER — ANESTHESIA EVENT (OUTPATIENT)
Dept: SURGERY | Facility: OTHER | Age: 46
End: 2021-03-23
Payer: COMMERCIAL

## 2021-03-23 VITALS
OXYGEN SATURATION: 97 % | DIASTOLIC BLOOD PRESSURE: 72 MMHG | HEART RATE: 65 BPM | SYSTOLIC BLOOD PRESSURE: 105 MMHG | HEIGHT: 66 IN | TEMPERATURE: 97 F | WEIGHT: 150 LBS | BODY MASS INDEX: 24.11 KG/M2

## 2021-03-23 DIAGNOSIS — S43.431A SUPERIOR GLENOID LABRUM LESION OF RIGHT SHOULDER, INITIAL ENCOUNTER: Primary | ICD-10-CM

## 2021-03-23 DIAGNOSIS — Z01.818 PRE-OP TESTING: ICD-10-CM

## 2021-03-23 PROCEDURE — U0005 INFEC AGEN DETEC AMPLI PROBE: HCPCS | Performed by: ANESTHESIOLOGY

## 2021-03-23 PROCEDURE — U0003 INFECTIOUS AGENT DETECTION BY NUCLEIC ACID (DNA OR RNA); SEVERE ACUTE RESPIRATORY SYNDROME CORONAVIRUS 2 (SARS-COV-2) (CORONAVIRUS DISEASE [COVID-19]), AMPLIFIED PROBE TECHNIQUE, MAKING USE OF HIGH THROUGHPUT TECHNOLOGIES AS DESCRIBED BY CMS-2020-01-R: HCPCS | Performed by: ANESTHESIOLOGY

## 2021-03-23 RX ORDER — SODIUM CHLORIDE, SODIUM LACTATE, POTASSIUM CHLORIDE, CALCIUM CHLORIDE 600; 310; 30; 20 MG/100ML; MG/100ML; MG/100ML; MG/100ML
INJECTION, SOLUTION INTRAVENOUS CONTINUOUS
Status: CANCELLED | OUTPATIENT
Start: 2021-03-23

## 2021-03-24 LAB — SARS-COV-2 RNA RESP QL NAA+PROBE: NOT DETECTED

## 2021-03-26 ENCOUNTER — ANESTHESIA (OUTPATIENT)
Dept: SURGERY | Facility: OTHER | Age: 46
End: 2021-03-26
Payer: COMMERCIAL

## 2021-03-26 ENCOUNTER — HOSPITAL ENCOUNTER (OUTPATIENT)
Facility: OTHER | Age: 46
Discharge: HOME | End: 2021-03-26
Attending: ORTHOPAEDIC SURGERY | Admitting: ORTHOPAEDIC SURGERY
Payer: COMMERCIAL

## 2021-03-26 VITALS
TEMPERATURE: 98 F | DIASTOLIC BLOOD PRESSURE: 63 MMHG | SYSTOLIC BLOOD PRESSURE: 119 MMHG | WEIGHT: 150 LBS | RESPIRATION RATE: 16 BRPM | OXYGEN SATURATION: 99 % | HEART RATE: 65 BPM | BODY MASS INDEX: 24.21 KG/M2

## 2021-03-26 DIAGNOSIS — S43.439A SLAP (SUPERIOR GLENOID LABRUM LESION): ICD-10-CM

## 2021-03-26 DIAGNOSIS — Z01.818 PRE-OP TESTING: ICD-10-CM

## 2021-03-26 PROCEDURE — 71000015 HC POSTOP RECOV 1ST HR: Performed by: ORTHOPAEDIC SURGERY

## 2021-03-26 PROCEDURE — 25000003 PHARM REV CODE 250: Performed by: NURSE ANESTHETIST, CERTIFIED REGISTERED

## 2021-03-26 PROCEDURE — C1713 ANCHOR/SCREW BN/BN,TIS/BN: HCPCS | Performed by: ORTHOPAEDIC SURGERY

## 2021-03-26 PROCEDURE — 63600175 PHARM REV CODE 636 W HCPCS: Performed by: ANESTHESIOLOGY

## 2021-03-26 PROCEDURE — 36000711: Performed by: ORTHOPAEDIC SURGERY

## 2021-03-26 PROCEDURE — 27201423 OPTIME MED/SURG SUP & DEVICES STERILE SUPPLY: Performed by: ORTHOPAEDIC SURGERY

## 2021-03-26 PROCEDURE — 63600175 PHARM REV CODE 636 W HCPCS: Performed by: ORTHOPAEDIC SURGERY

## 2021-03-26 PROCEDURE — 71000016 HC POSTOP RECOV ADDL HR: Performed by: ORTHOPAEDIC SURGERY

## 2021-03-26 PROCEDURE — 25000003 PHARM REV CODE 250: Performed by: ORTHOPAEDIC SURGERY

## 2021-03-26 PROCEDURE — 37000009 HC ANESTHESIA EA ADD 15 MINS: Performed by: ORTHOPAEDIC SURGERY

## 2021-03-26 PROCEDURE — 63600175 PHARM REV CODE 636 W HCPCS: Performed by: NURSE ANESTHETIST, CERTIFIED REGISTERED

## 2021-03-26 PROCEDURE — 37000008 HC ANESTHESIA 1ST 15 MINUTES: Performed by: ORTHOPAEDIC SURGERY

## 2021-03-26 PROCEDURE — 76942 ECHO GUIDE FOR BIOPSY: CPT | Performed by: ANESTHESIOLOGY

## 2021-03-26 PROCEDURE — 64415 NJX AA&/STRD BRCH PLXS IMG: CPT | Performed by: ANESTHESIOLOGY

## 2021-03-26 PROCEDURE — 71000033 HC RECOVERY, INTIAL HOUR: Performed by: ORTHOPAEDIC SURGERY

## 2021-03-26 PROCEDURE — 36000710: Performed by: ORTHOPAEDIC SURGERY

## 2021-03-26 DEVICE — KIT FIBERTAK ANCHR DRILL 1.9MM: Type: IMPLANTABLE DEVICE | Site: SHOULDER | Status: FUNCTIONAL

## 2021-03-26 RX ORDER — ONDANSETRON 2 MG/ML
4 INJECTION INTRAMUSCULAR; INTRAVENOUS EVERY 12 HOURS PRN
Status: DISCONTINUED | OUTPATIENT
Start: 2021-03-26 | End: 2021-03-26 | Stop reason: HOSPADM

## 2021-03-26 RX ORDER — MEPERIDINE HYDROCHLORIDE 25 MG/ML
12.5 INJECTION INTRAMUSCULAR; INTRAVENOUS; SUBCUTANEOUS ONCE AS NEEDED
Status: COMPLETED | OUTPATIENT
Start: 2021-03-26 | End: 2021-03-26

## 2021-03-26 RX ORDER — PROPOFOL 10 MG/ML
VIAL (ML) INTRAVENOUS
Status: DISCONTINUED | OUTPATIENT
Start: 2021-03-26 | End: 2021-03-26

## 2021-03-26 RX ORDER — ONDANSETRON 4 MG/1
8 TABLET, ORALLY DISINTEGRATING ORAL EVERY 8 HOURS PRN
Qty: 25 TABLET | Refills: 0 | Status: SHIPPED | OUTPATIENT
Start: 2021-03-26 | End: 2023-09-12

## 2021-03-26 RX ORDER — DEXAMETHASONE SODIUM PHOSPHATE 4 MG/ML
INJECTION, SOLUTION INTRA-ARTICULAR; INTRALESIONAL; INTRAMUSCULAR; INTRAVENOUS; SOFT TISSUE
Status: DISCONTINUED | OUTPATIENT
Start: 2021-03-26 | End: 2021-03-26

## 2021-03-26 RX ORDER — SODIUM CHLORIDE, SODIUM LACTATE, POTASSIUM CHLORIDE, CALCIUM CHLORIDE 600; 310; 30; 20 MG/100ML; MG/100ML; MG/100ML; MG/100ML
INJECTION, SOLUTION INTRAVENOUS CONTINUOUS
Status: DISCONTINUED | OUTPATIENT
Start: 2021-03-26 | End: 2021-03-26 | Stop reason: HOSPADM

## 2021-03-26 RX ORDER — ROPIVACAINE HYDROCHLORIDE 5 MG/ML
INJECTION, SOLUTION EPIDURAL; INFILTRATION; PERINEURAL
Status: COMPLETED | OUTPATIENT
Start: 2021-03-26 | End: 2021-03-26

## 2021-03-26 RX ORDER — ONDANSETRON 2 MG/ML
INJECTION INTRAMUSCULAR; INTRAVENOUS
Status: DISCONTINUED | OUTPATIENT
Start: 2021-03-26 | End: 2021-03-26

## 2021-03-26 RX ORDER — HYDROCODONE BITARTRATE AND ACETAMINOPHEN 5; 325 MG/1; MG/1
1 TABLET ORAL EVERY 4 HOURS PRN
Qty: 35 TABLET | Refills: 0 | Status: SHIPPED | OUTPATIENT
Start: 2021-03-26 | End: 2023-09-12

## 2021-03-26 RX ORDER — HYDROCODONE BITARTRATE AND ACETAMINOPHEN 5; 325 MG/1; MG/1
1 TABLET ORAL EVERY 4 HOURS PRN
Status: DISCONTINUED | OUTPATIENT
Start: 2021-03-26 | End: 2021-03-26 | Stop reason: HOSPADM

## 2021-03-26 RX ORDER — NEOSTIGMINE METHYLSULFATE 1 MG/ML
INJECTION, SOLUTION INTRAVENOUS
Status: DISCONTINUED | OUTPATIENT
Start: 2021-03-26 | End: 2021-03-26

## 2021-03-26 RX ORDER — OXYCODONE HYDROCHLORIDE 5 MG/1
5 TABLET ORAL
Status: DISCONTINUED | OUTPATIENT
Start: 2021-03-26 | End: 2021-03-26 | Stop reason: HOSPADM

## 2021-03-26 RX ORDER — FENTANYL CITRATE 50 UG/ML
100 INJECTION, SOLUTION INTRAMUSCULAR; INTRAVENOUS EVERY 5 MIN PRN
Status: COMPLETED | OUTPATIENT
Start: 2021-03-26 | End: 2021-03-26

## 2021-03-26 RX ORDER — MIDAZOLAM HYDROCHLORIDE 1 MG/ML
2 INJECTION INTRAMUSCULAR; INTRAVENOUS
Status: COMPLETED | OUTPATIENT
Start: 2021-03-26 | End: 2021-03-26

## 2021-03-26 RX ORDER — ROCURONIUM BROMIDE 10 MG/ML
INJECTION, SOLUTION INTRAVENOUS
Status: DISCONTINUED | OUTPATIENT
Start: 2021-03-26 | End: 2021-03-26

## 2021-03-26 RX ORDER — LIDOCAINE HYDROCHLORIDE 20 MG/ML
INJECTION INTRAVENOUS
Status: DISCONTINUED | OUTPATIENT
Start: 2021-03-26 | End: 2021-03-26

## 2021-03-26 RX ORDER — SODIUM CHLORIDE 0.9 % (FLUSH) 0.9 %
3 SYRINGE (ML) INJECTION
Status: DISCONTINUED | OUTPATIENT
Start: 2021-03-26 | End: 2021-03-26 | Stop reason: HOSPADM

## 2021-03-26 RX ORDER — MUPIROCIN 20 MG/G
OINTMENT TOPICAL
Status: DISPENSED | OUTPATIENT
Start: 2021-03-26

## 2021-03-26 RX ORDER — EPINEPHRINE 1 MG/ML
INJECTION, SOLUTION INTRACARDIAC; INTRAMUSCULAR; INTRAVENOUS; SUBCUTANEOUS
Status: DISCONTINUED | OUTPATIENT
Start: 2021-03-26 | End: 2021-03-26 | Stop reason: HOSPADM

## 2021-03-26 RX ORDER — TRAMADOL HYDROCHLORIDE 50 MG/1
50 TABLET ORAL EVERY 4 HOURS PRN
Status: DISCONTINUED | OUTPATIENT
Start: 2021-03-26 | End: 2021-03-26 | Stop reason: HOSPADM

## 2021-03-26 RX ORDER — ONDANSETRON 2 MG/ML
4 INJECTION INTRAMUSCULAR; INTRAVENOUS DAILY PRN
Status: DISCONTINUED | OUTPATIENT
Start: 2021-03-26 | End: 2021-03-26 | Stop reason: HOSPADM

## 2021-03-26 RX ORDER — HYDROMORPHONE HYDROCHLORIDE 1 MG/ML
1 INJECTION, SOLUTION INTRAMUSCULAR; INTRAVENOUS; SUBCUTANEOUS EVERY 4 HOURS PRN
Status: DISCONTINUED | OUTPATIENT
Start: 2021-03-26 | End: 2021-03-26 | Stop reason: HOSPADM

## 2021-03-26 RX ORDER — CEFAZOLIN SODIUM 1 G/3ML
2 INJECTION, POWDER, FOR SOLUTION INTRAMUSCULAR; INTRAVENOUS
Status: COMPLETED | OUTPATIENT
Start: 2021-03-26 | End: 2021-03-26

## 2021-03-26 RX ORDER — HYDROMORPHONE HYDROCHLORIDE 2 MG/ML
0.4 INJECTION, SOLUTION INTRAMUSCULAR; INTRAVENOUS; SUBCUTANEOUS EVERY 5 MIN PRN
Status: DISCONTINUED | OUTPATIENT
Start: 2021-03-26 | End: 2021-03-26 | Stop reason: HOSPADM

## 2021-03-26 RX ORDER — ONDANSETRON 8 MG/1
8 TABLET, ORALLY DISINTEGRATING ORAL EVERY 8 HOURS PRN
Status: DISCONTINUED | OUTPATIENT
Start: 2021-03-26 | End: 2021-03-26 | Stop reason: HOSPADM

## 2021-03-26 RX ADMIN — LIDOCAINE HYDROCHLORIDE 50 MG: 20 INJECTION, SOLUTION INTRAVENOUS at 08:03

## 2021-03-26 RX ADMIN — MUPIROCIN: 20 OINTMENT TOPICAL at 06:03

## 2021-03-26 RX ADMIN — DEXAMETHASONE SODIUM PHOSPHATE 8 MG: 4 INJECTION, SOLUTION INTRAMUSCULAR; INTRAVENOUS at 09:03

## 2021-03-26 RX ADMIN — GLYCOPYRROLATE 0.8 MG: 0.2 INJECTION, SOLUTION INTRAMUSCULAR; INTRAVITREAL at 09:03

## 2021-03-26 RX ADMIN — PHENYLEPHRINE HYDROCHLORIDE 0.4 MCG/KG/MIN: 10 INJECTION INTRAVENOUS at 08:03

## 2021-03-26 RX ADMIN — CEFAZOLIN 2 G: 330 INJECTION, POWDER, FOR SOLUTION INTRAMUSCULAR; INTRAVENOUS at 08:03

## 2021-03-26 RX ADMIN — SODIUM CHLORIDE, SODIUM LACTATE, POTASSIUM CHLORIDE, AND CALCIUM CHLORIDE: 600; 310; 30; 20 INJECTION, SOLUTION INTRAVENOUS at 07:03

## 2021-03-26 RX ADMIN — ONDANSETRON HYDROCHLORIDE 4 MG: 2 INJECTION INTRAMUSCULAR; INTRAVENOUS at 09:03

## 2021-03-26 RX ADMIN — ROPIVACAINE HYDROCHLORIDE 30 ML: 5 INJECTION, SOLUTION EPIDURAL; INFILTRATION; PERINEURAL at 07:03

## 2021-03-26 RX ADMIN — PROPOFOL 160 MG: 10 INJECTION, EMULSION INTRAVENOUS at 08:03

## 2021-03-26 RX ADMIN — CARBOXYMETHYLCELLULOSE SODIUM 2 DROP: 2.5 SOLUTION/ DROPS OPHTHALMIC at 08:03

## 2021-03-26 RX ADMIN — SODIUM CHLORIDE, SODIUM LACTATE, POTASSIUM CHLORIDE, AND CALCIUM CHLORIDE: 600; 310; 30; 20 INJECTION, SOLUTION INTRAVENOUS at 10:03

## 2021-03-26 RX ADMIN — MIDAZOLAM HYDROCHLORIDE 2 MG: 1 INJECTION, SOLUTION INTRAMUSCULAR; INTRAVENOUS at 07:03

## 2021-03-26 RX ADMIN — MEPERIDINE HYDROCHLORIDE 12.5 MG: 25 INJECTION INTRAMUSCULAR; INTRAVENOUS; SUBCUTANEOUS at 10:03

## 2021-03-26 RX ADMIN — FENTANYL CITRATE 100 MCG: 50 INJECTION, SOLUTION INTRAMUSCULAR; INTRAVENOUS at 07:03

## 2021-03-26 RX ADMIN — NEOSTIGMINE METHYLSULFATE 5 MG: 1 INJECTION INTRAVENOUS at 09:03

## 2021-03-26 RX ADMIN — ROCURONIUM BROMIDE 50 MG: 10 INJECTION, SOLUTION INTRAVENOUS at 08:03

## 2021-03-26 RX ADMIN — GLYCOPYRROLATE 0.2 MG: 0.2 INJECTION, SOLUTION INTRAMUSCULAR; INTRAVITREAL at 09:03

## 2021-04-21 ENCOUNTER — TELEPHONE (OUTPATIENT)
Dept: FAMILY MEDICINE | Facility: CLINIC | Age: 46
End: 2021-04-21

## 2021-04-21 ENCOUNTER — HOSPITAL ENCOUNTER (OUTPATIENT)
Facility: HOSPITAL | Age: 46
Discharge: HOME | End: 2021-04-22
Attending: EMERGENCY MEDICINE | Admitting: INTERNAL MEDICINE
Payer: COMMERCIAL

## 2021-04-21 DIAGNOSIS — T78.2XXA ANAPHYLAXIS, INITIAL ENCOUNTER: Primary | ICD-10-CM

## 2021-04-21 PROBLEM — R79.89 ELEVATED LFTS: Status: ACTIVE | Noted: 2021-04-21

## 2021-04-21 LAB
ALBUMIN SERPL BCP-MCNC: 4.2 G/DL (ref 3.5–5.2)
ALP SERPL-CCNC: 62 U/L (ref 55–135)
ALT SERPL W/O P-5'-P-CCNC: 81 U/L (ref 10–44)
ANION GAP SERPL CALC-SCNC: 10 MMOL/L (ref 8–16)
AST SERPL-CCNC: 59 U/L (ref 10–40)
BASOPHILS # BLD AUTO: 0.03 K/UL (ref 0–0.2)
BASOPHILS NFR BLD: 0.3 % (ref 0–1.9)
BILIRUB SERPL-MCNC: 0.3 MG/DL (ref 0.1–1)
BUN SERPL-MCNC: 22 MG/DL (ref 6–20)
CALCIUM SERPL-MCNC: 9.2 MG/DL (ref 8.7–10.5)
CHLORIDE SERPL-SCNC: 106 MMOL/L (ref 95–110)
CO2 SERPL-SCNC: 24 MMOL/L (ref 23–29)
CREAT SERPL-MCNC: 1 MG/DL (ref 0.5–1.4)
DIFFERENTIAL METHOD: ABNORMAL
EOSINOPHIL # BLD AUTO: 0.1 K/UL (ref 0–0.5)
EOSINOPHIL NFR BLD: 1 % (ref 0–8)
ERYTHROCYTE [DISTWIDTH] IN BLOOD BY AUTOMATED COUNT: 12.4 % (ref 11.5–14.5)
EST. GFR  (AFRICAN AMERICAN): >60 ML/MIN/1.73 M^2
EST. GFR  (NON AFRICAN AMERICAN): >60 ML/MIN/1.73 M^2
GLUCOSE SERPL-MCNC: 102 MG/DL (ref 70–110)
HCT VFR BLD AUTO: 41.3 % (ref 40–54)
HGB BLD-MCNC: 14.2 G/DL (ref 14–18)
IMM GRANULOCYTES # BLD AUTO: 0.05 K/UL (ref 0–0.04)
IMM GRANULOCYTES NFR BLD AUTO: 0.4 % (ref 0–0.5)
LYMPHOCYTES # BLD AUTO: 4.2 K/UL (ref 1–4.8)
LYMPHOCYTES NFR BLD: 35.7 % (ref 18–48)
MCH RBC QN AUTO: 31 PG (ref 27–31)
MCHC RBC AUTO-ENTMCNC: 34.4 G/DL (ref 32–36)
MCV RBC AUTO: 90 FL (ref 82–98)
MONOCYTES # BLD AUTO: 0.5 K/UL (ref 0.3–1)
MONOCYTES NFR BLD: 4.3 % (ref 4–15)
NEUTROPHILS # BLD AUTO: 6.8 K/UL (ref 1.8–7.7)
NEUTROPHILS NFR BLD: 58.3 % (ref 38–73)
NRBC BLD-RTO: 0 /100 WBC
PLATELET # BLD AUTO: 240 K/UL (ref 150–450)
PMV BLD AUTO: 9.9 FL (ref 9.2–12.9)
POTASSIUM SERPL-SCNC: 4.5 MMOL/L (ref 3.5–5.1)
PROT SERPL-MCNC: 7 G/DL (ref 6–8.4)
RBC # BLD AUTO: 4.58 M/UL (ref 4.6–6.2)
SARS-COV-2 RDRP RESP QL NAA+PROBE: NEGATIVE
SODIUM SERPL-SCNC: 140 MMOL/L (ref 136–145)
WBC # BLD AUTO: 11.71 K/UL (ref 3.9–12.7)

## 2021-04-21 PROCEDURE — U0002 COVID-19 LAB TEST NON-CDC: HCPCS | Performed by: EMERGENCY MEDICINE

## 2021-04-21 PROCEDURE — 99291 CRITICAL CARE FIRST HOUR: CPT | Mod: 25

## 2021-04-21 PROCEDURE — 96372 THER/PROPH/DIAG INJ SC/IM: CPT | Mod: 59

## 2021-04-21 PROCEDURE — 36415 COLL VENOUS BLD VENIPUNCTURE: CPT | Performed by: INTERNAL MEDICINE

## 2021-04-21 PROCEDURE — 25000003 PHARM REV CODE 250: Performed by: NURSE PRACTITIONER

## 2021-04-21 PROCEDURE — 25000003 PHARM REV CODE 250: Performed by: EMERGENCY MEDICINE

## 2021-04-21 PROCEDURE — 63600175 PHARM REV CODE 636 W HCPCS: Performed by: EMERGENCY MEDICINE

## 2021-04-21 PROCEDURE — 96361 HYDRATE IV INFUSION ADD-ON: CPT

## 2021-04-21 PROCEDURE — G0378 HOSPITAL OBSERVATION PER HR: HCPCS

## 2021-04-21 PROCEDURE — 96374 THER/PROPH/DIAG INJ IV PUSH: CPT

## 2021-04-21 PROCEDURE — 63600175 PHARM REV CODE 636 W HCPCS: Performed by: INTERNAL MEDICINE

## 2021-04-21 PROCEDURE — 94640 AIRWAY INHALATION TREATMENT: CPT

## 2021-04-21 PROCEDURE — 25000242 PHARM REV CODE 250 ALT 637 W/ HCPCS: Performed by: EMERGENCY MEDICINE

## 2021-04-21 PROCEDURE — 80053 COMPREHEN METABOLIC PANEL: CPT | Performed by: EMERGENCY MEDICINE

## 2021-04-21 PROCEDURE — 80074 ACUTE HEPATITIS PANEL: CPT | Performed by: INTERNAL MEDICINE

## 2021-04-21 PROCEDURE — 96375 TX/PRO/DX INJ NEW DRUG ADDON: CPT

## 2021-04-21 PROCEDURE — 36415 COLL VENOUS BLD VENIPUNCTURE: CPT | Performed by: EMERGENCY MEDICINE

## 2021-04-21 PROCEDURE — 85025 COMPLETE CBC W/AUTO DIFF WBC: CPT | Performed by: EMERGENCY MEDICINE

## 2021-04-21 PROCEDURE — 63600175 PHARM REV CODE 636 W HCPCS: Performed by: NURSE PRACTITIONER

## 2021-04-21 PROCEDURE — 25000003 PHARM REV CODE 250: Performed by: INTERNAL MEDICINE

## 2021-04-21 PROCEDURE — 96376 TX/PRO/DX INJ SAME DRUG ADON: CPT

## 2021-04-21 RX ORDER — FAMOTIDINE 10 MG/ML
20 INJECTION INTRAVENOUS 2 TIMES DAILY
Status: DISCONTINUED | OUTPATIENT
Start: 2021-04-21 | End: 2021-04-22 | Stop reason: HOSPADM

## 2021-04-21 RX ORDER — FAMOTIDINE 10 MG/ML
20 INJECTION INTRAVENOUS
Status: COMPLETED | OUTPATIENT
Start: 2021-04-21 | End: 2021-04-21

## 2021-04-21 RX ORDER — LANOLIN ALCOHOL/MO/W.PET/CERES
800 CREAM (GRAM) TOPICAL
Status: DISCONTINUED | OUTPATIENT
Start: 2021-04-21 | End: 2021-04-22 | Stop reason: HOSPADM

## 2021-04-21 RX ORDER — ACETAMINOPHEN 325 MG/1
650 TABLET ORAL EVERY 4 HOURS PRN
Status: DISCONTINUED | OUTPATIENT
Start: 2021-04-21 | End: 2021-04-21

## 2021-04-21 RX ORDER — METHYLPREDNISOLONE SOD SUCC 125 MG
125 VIAL (EA) INJECTION
Status: COMPLETED | OUTPATIENT
Start: 2021-04-21 | End: 2021-04-21

## 2021-04-21 RX ORDER — SODIUM CHLORIDE 9 MG/ML
INJECTION, SOLUTION INTRAVENOUS CONTINUOUS
Status: DISCONTINUED | OUTPATIENT
Start: 2021-04-21 | End: 2021-04-22 | Stop reason: HOSPADM

## 2021-04-21 RX ORDER — ALPRAZOLAM 0.25 MG/1
0.5 TABLET ORAL 2 TIMES DAILY PRN
Status: DISCONTINUED | OUTPATIENT
Start: 2021-04-21 | End: 2021-04-22 | Stop reason: HOSPADM

## 2021-04-21 RX ORDER — DIPHENHYDRAMINE HYDROCHLORIDE 50 MG/ML
12.5 INJECTION INTRAMUSCULAR; INTRAVENOUS EVERY 6 HOURS PRN
Status: DISCONTINUED | OUTPATIENT
Start: 2021-04-21 | End: 2021-04-21

## 2021-04-21 RX ORDER — MORPHINE SULFATE 2 MG/ML
2 INJECTION, SOLUTION INTRAMUSCULAR; INTRAVENOUS EVERY 4 HOURS PRN
Status: DISCONTINUED | OUTPATIENT
Start: 2021-04-21 | End: 2021-04-22 | Stop reason: HOSPADM

## 2021-04-21 RX ORDER — SODIUM,POTASSIUM PHOSPHATES 280-250MG
2 POWDER IN PACKET (EA) ORAL
Status: DISCONTINUED | OUTPATIENT
Start: 2021-04-21 | End: 2021-04-22 | Stop reason: HOSPADM

## 2021-04-21 RX ORDER — EPINEPHRINE 0.3 MG/.3ML
0.3 INJECTION SUBCUTANEOUS
Status: COMPLETED | OUTPATIENT
Start: 2021-04-21 | End: 2021-04-21

## 2021-04-21 RX ORDER — ONDANSETRON 2 MG/ML
4 INJECTION INTRAMUSCULAR; INTRAVENOUS EVERY 6 HOURS PRN
Status: DISCONTINUED | OUTPATIENT
Start: 2021-04-21 | End: 2021-04-22 | Stop reason: HOSPADM

## 2021-04-21 RX ORDER — EPINEPHRINE 0.3 MG/.3ML
0.3 INJECTION SUBCUTANEOUS ONCE
Status: COMPLETED | OUTPATIENT
Start: 2021-04-21 | End: 2021-04-21

## 2021-04-21 RX ORDER — SODIUM CHLORIDE 0.9 % (FLUSH) 0.9 %
10 SYRINGE (ML) INJECTION
Status: DISCONTINUED | OUTPATIENT
Start: 2021-04-21 | End: 2021-04-22 | Stop reason: HOSPADM

## 2021-04-21 RX ORDER — LORAZEPAM 2 MG/ML
0.5 INJECTION INTRAMUSCULAR ONCE AS NEEDED
Status: COMPLETED | OUTPATIENT
Start: 2021-04-21 | End: 2021-04-21

## 2021-04-21 RX ORDER — DIPHENHYDRAMINE HYDROCHLORIDE 50 MG/ML
50 INJECTION INTRAMUSCULAR; INTRAVENOUS
Status: COMPLETED | OUTPATIENT
Start: 2021-04-21 | End: 2021-04-21

## 2021-04-21 RX ORDER — DIPHENHYDRAMINE HYDROCHLORIDE 50 MG/ML
25 INJECTION INTRAMUSCULAR; INTRAVENOUS EVERY 6 HOURS
Status: DISCONTINUED | OUTPATIENT
Start: 2021-04-21 | End: 2021-04-22 | Stop reason: HOSPADM

## 2021-04-21 RX ORDER — IPRATROPIUM BROMIDE AND ALBUTEROL SULFATE 2.5; .5 MG/3ML; MG/3ML
3 SOLUTION RESPIRATORY (INHALATION)
Status: COMPLETED | OUTPATIENT
Start: 2021-04-21 | End: 2021-04-21

## 2021-04-21 RX ADMIN — FAMOTIDINE 20 MG: 10 INJECTION INTRAVENOUS at 09:04

## 2021-04-21 RX ADMIN — DIPHENHYDRAMINE HYDROCHLORIDE 25 MG: 50 INJECTION INTRAMUSCULAR; INTRAVENOUS at 04:04

## 2021-04-21 RX ADMIN — EPINEPHRINE 0.3 MG: 0.3 INJECTION INTRAMUSCULAR at 03:04

## 2021-04-21 RX ADMIN — MORPHINE SULFATE 2 MG: 2 INJECTION, SOLUTION INTRAMUSCULAR; INTRAVENOUS at 04:04

## 2021-04-21 RX ADMIN — METHYLPREDNISOLONE SODIUM SUCCINATE 60 MG: 40 INJECTION, POWDER, FOR SOLUTION INTRAMUSCULAR; INTRAVENOUS at 02:04

## 2021-04-21 RX ADMIN — METHYLPREDNISOLONE SODIUM SUCCINATE 60 MG: 40 INJECTION, POWDER, FOR SOLUTION INTRAMUSCULAR; INTRAVENOUS at 09:04

## 2021-04-21 RX ADMIN — SODIUM CHLORIDE 1000 ML: 0.9 INJECTION, SOLUTION INTRAVENOUS at 03:04

## 2021-04-21 RX ADMIN — FAMOTIDINE 20 MG: 10 INJECTION INTRAVENOUS at 02:04

## 2021-04-21 RX ADMIN — LORAZEPAM 0.5 MG: 2 INJECTION INTRAMUSCULAR; INTRAVENOUS at 05:04

## 2021-04-21 RX ADMIN — ALPRAZOLAM 0.5 MG: 0.25 TABLET ORAL at 02:04

## 2021-04-21 RX ADMIN — DIPHENHYDRAMINE HYDROCHLORIDE 50 MG: 50 INJECTION INTRAMUSCULAR; INTRAVENOUS at 02:04

## 2021-04-21 RX ADMIN — IPRATROPIUM BROMIDE AND ALBUTEROL SULFATE 3 ML: .5; 2.5 SOLUTION RESPIRATORY (INHALATION) at 04:04

## 2021-04-21 RX ADMIN — METHYLPREDNISOLONE SODIUM SUCCINATE 125 MG: 125 INJECTION, POWDER, FOR SOLUTION INTRAMUSCULAR; INTRAVENOUS at 02:04

## 2021-04-21 RX ADMIN — SODIUM CHLORIDE: 0.9 INJECTION, SOLUTION INTRAVENOUS at 09:04

## 2021-04-21 RX ADMIN — MORPHINE SULFATE 2 MG: 2 INJECTION, SOLUTION INTRAMUSCULAR; INTRAVENOUS at 11:04

## 2021-04-21 RX ADMIN — EPINEPHRINE 0.3 MG: 0.3 INJECTION INTRAMUSCULAR at 02:04

## 2021-04-21 RX ADMIN — SODIUM CHLORIDE: 0.9 INJECTION, SOLUTION INTRAVENOUS at 05:04

## 2021-04-21 RX ADMIN — ALPRAZOLAM 0.5 MG: 0.25 TABLET ORAL at 04:04

## 2021-04-22 VITALS
HEART RATE: 92 BPM | OXYGEN SATURATION: 97 % | DIASTOLIC BLOOD PRESSURE: 63 MMHG | BODY MASS INDEX: 24.11 KG/M2 | TEMPERATURE: 98 F | HEIGHT: 66 IN | WEIGHT: 150 LBS | SYSTOLIC BLOOD PRESSURE: 104 MMHG | RESPIRATION RATE: 25 BRPM

## 2021-04-22 LAB
ALBUMIN SERPL BCP-MCNC: 3.5 G/DL (ref 3.5–5.2)
ALP SERPL-CCNC: 59 U/L (ref 55–135)
ALT SERPL W/O P-5'-P-CCNC: 51 U/L (ref 10–44)
ANION GAP SERPL CALC-SCNC: 8 MMOL/L (ref 8–16)
AST SERPL-CCNC: 20 U/L (ref 10–40)
BASOPHILS # BLD AUTO: 0.01 K/UL (ref 0–0.2)
BASOPHILS NFR BLD: 0.1 % (ref 0–1.9)
BILIRUB SERPL-MCNC: 0.2 MG/DL (ref 0.1–1)
BUN SERPL-MCNC: 14 MG/DL (ref 6–20)
CALCIUM SERPL-MCNC: 8.7 MG/DL (ref 8.7–10.5)
CHLORIDE SERPL-SCNC: 109 MMOL/L (ref 95–110)
CO2 SERPL-SCNC: 24 MMOL/L (ref 23–29)
CREAT SERPL-MCNC: 0.8 MG/DL (ref 0.5–1.4)
DIFFERENTIAL METHOD: ABNORMAL
EOSINOPHIL # BLD AUTO: 0 K/UL (ref 0–0.5)
EOSINOPHIL NFR BLD: 0 % (ref 0–8)
ERYTHROCYTE [DISTWIDTH] IN BLOOD BY AUTOMATED COUNT: 12.1 % (ref 11.5–14.5)
EST. GFR  (AFRICAN AMERICAN): >60 ML/MIN/1.73 M^2
EST. GFR  (NON AFRICAN AMERICAN): >60 ML/MIN/1.73 M^2
GLUCOSE SERPL-MCNC: 145 MG/DL (ref 70–110)
HAV IGM SERPL QL IA: NEGATIVE
HBV CORE IGM SERPL QL IA: NEGATIVE
HBV SURFACE AG SERPL QL IA: NEGATIVE
HCT VFR BLD AUTO: 36.6 % (ref 40–54)
HCV AB SERPL QL IA: NEGATIVE
HGB BLD-MCNC: 12.5 G/DL (ref 14–18)
IMM GRANULOCYTES # BLD AUTO: 0.13 K/UL (ref 0–0.04)
IMM GRANULOCYTES NFR BLD AUTO: 0.9 % (ref 0–0.5)
LYMPHOCYTES # BLD AUTO: 1.6 K/UL (ref 1–4.8)
LYMPHOCYTES NFR BLD: 11.2 % (ref 18–48)
MCH RBC QN AUTO: 31.1 PG (ref 27–31)
MCHC RBC AUTO-ENTMCNC: 34.2 G/DL (ref 32–36)
MCV RBC AUTO: 91 FL (ref 82–98)
MONOCYTES # BLD AUTO: 0.3 K/UL (ref 0.3–1)
MONOCYTES NFR BLD: 1.9 % (ref 4–15)
NEUTROPHILS # BLD AUTO: 12.3 K/UL (ref 1.8–7.7)
NEUTROPHILS NFR BLD: 85.9 % (ref 38–73)
NRBC BLD-RTO: 0 /100 WBC
PLATELET # BLD AUTO: 191 K/UL (ref 150–450)
PMV BLD AUTO: 9.8 FL (ref 9.2–12.9)
POTASSIUM SERPL-SCNC: 4.3 MMOL/L (ref 3.5–5.1)
PROT SERPL-MCNC: 6 G/DL (ref 6–8.4)
RBC # BLD AUTO: 4.02 M/UL (ref 4.6–6.2)
SODIUM SERPL-SCNC: 141 MMOL/L (ref 136–145)
WBC # BLD AUTO: 14.26 K/UL (ref 3.9–12.7)

## 2021-04-22 PROCEDURE — 94761 N-INVAS EAR/PLS OXIMETRY MLT: CPT

## 2021-04-22 PROCEDURE — 63600175 PHARM REV CODE 636 W HCPCS: Performed by: INTERNAL MEDICINE

## 2021-04-22 PROCEDURE — 25000003 PHARM REV CODE 250: Performed by: NURSE PRACTITIONER

## 2021-04-22 PROCEDURE — 80053 COMPREHEN METABOLIC PANEL: CPT | Performed by: NURSE PRACTITIONER

## 2021-04-22 PROCEDURE — 25000003 PHARM REV CODE 250: Performed by: INTERNAL MEDICINE

## 2021-04-22 PROCEDURE — 96376 TX/PRO/DX INJ SAME DRUG ADON: CPT

## 2021-04-22 PROCEDURE — 85025 COMPLETE CBC W/AUTO DIFF WBC: CPT | Performed by: NURSE PRACTITIONER

## 2021-04-22 PROCEDURE — G0378 HOSPITAL OBSERVATION PER HR: HCPCS

## 2021-04-22 PROCEDURE — 36415 COLL VENOUS BLD VENIPUNCTURE: CPT | Performed by: NURSE PRACTITIONER

## 2021-04-22 PROCEDURE — 96361 HYDRATE IV INFUSION ADD-ON: CPT

## 2021-04-22 RX ORDER — DIPHENHYDRAMINE HCL 25 MG
25 CAPSULE ORAL EVERY 6 HOURS PRN
Qty: 30 CAPSULE | Refills: 0 | Status: SHIPPED | OUTPATIENT
Start: 2021-04-22 | End: 2023-09-12

## 2021-04-22 RX ORDER — METHYLPREDNISOLONE 4 MG/1
TABLET ORAL
Qty: 21 TABLET | Refills: 0 | Status: SHIPPED | OUTPATIENT
Start: 2021-04-22 | End: 2022-12-20 | Stop reason: ALTCHOICE

## 2021-04-22 RX ORDER — FAMOTIDINE 20 MG/1
20 TABLET, FILM COATED ORAL 2 TIMES DAILY
Qty: 30 TABLET | Refills: 0 | Status: SHIPPED | OUTPATIENT
Start: 2021-04-22 | End: 2023-09-12

## 2021-04-22 RX ADMIN — METHYLPREDNISOLONE SODIUM SUCCINATE 60 MG: 40 INJECTION, POWDER, FOR SOLUTION INTRAMUSCULAR; INTRAVENOUS at 05:04

## 2021-04-22 RX ADMIN — SODIUM CHLORIDE: 0.9 INJECTION, SOLUTION INTRAVENOUS at 04:04

## 2021-04-22 RX ADMIN — FAMOTIDINE 20 MG: 10 INJECTION INTRAVENOUS at 08:04

## 2021-04-22 RX ADMIN — DIPHENHYDRAMINE HYDROCHLORIDE 25 MG: 50 INJECTION INTRAMUSCULAR; INTRAVENOUS at 05:04

## 2021-04-22 RX ADMIN — DIPHENHYDRAMINE HYDROCHLORIDE 25 MG: 50 INJECTION INTRAMUSCULAR; INTRAVENOUS at 12:04

## 2021-04-22 RX ADMIN — DIPHENHYDRAMINE HYDROCHLORIDE 25 MG: 50 INJECTION INTRAMUSCULAR; INTRAVENOUS at 11:04

## 2022-12-20 ENCOUNTER — HOSPITAL ENCOUNTER (OUTPATIENT)
Dept: RADIOLOGY | Facility: HOSPITAL | Age: 47
Discharge: HOME | End: 2022-12-20
Attending: PHYSICIAN ASSISTANT
Payer: COMMERCIAL

## 2022-12-20 ENCOUNTER — OFFICE VISIT (OUTPATIENT)
Dept: FAMILY MEDICINE | Facility: CLINIC | Age: 47
End: 2022-12-20
Payer: COMMERCIAL

## 2022-12-20 ENCOUNTER — TELEPHONE (OUTPATIENT)
Dept: FAMILY MEDICINE | Facility: CLINIC | Age: 47
End: 2022-12-20
Payer: COMMERCIAL

## 2022-12-20 VITALS
WEIGHT: 155.88 LBS | BODY MASS INDEX: 25.05 KG/M2 | SYSTOLIC BLOOD PRESSURE: 126 MMHG | RESPIRATION RATE: 19 BRPM | OXYGEN SATURATION: 99 % | TEMPERATURE: 100 F | HEIGHT: 66 IN | DIASTOLIC BLOOD PRESSURE: 70 MMHG | HEART RATE: 90 BPM

## 2022-12-20 DIAGNOSIS — B96.89 ACUTE BACTERIAL BRONCHITIS: ICD-10-CM

## 2022-12-20 DIAGNOSIS — R50.9 FEVER, UNSPECIFIED FEVER CAUSE: ICD-10-CM

## 2022-12-20 DIAGNOSIS — J20.8 ACUTE BACTERIAL BRONCHITIS: ICD-10-CM

## 2022-12-20 DIAGNOSIS — R05.9 COUGH, UNSPECIFIED TYPE: Primary | ICD-10-CM

## 2022-12-20 DIAGNOSIS — R05.9 COUGH, UNSPECIFIED TYPE: ICD-10-CM

## 2022-12-20 PROCEDURE — 99213 OFFICE O/P EST LOW 20 MIN: CPT | Mod: S$GLB,,, | Performed by: PHYSICIAN ASSISTANT

## 2022-12-20 PROCEDURE — 71046 XR CHEST PA AND LATERAL: ICD-10-PCS | Mod: 26,,, | Performed by: RADIOLOGY

## 2022-12-20 PROCEDURE — 3074F PR MOST RECENT SYSTOLIC BLOOD PRESSURE < 130 MM HG: ICD-10-PCS | Mod: CPTII,S$GLB,, | Performed by: PHYSICIAN ASSISTANT

## 2022-12-20 PROCEDURE — 1160F RVW MEDS BY RX/DR IN RCRD: CPT | Mod: CPTII,S$GLB,, | Performed by: PHYSICIAN ASSISTANT

## 2022-12-20 PROCEDURE — 3008F BODY MASS INDEX DOCD: CPT | Mod: CPTII,S$GLB,, | Performed by: PHYSICIAN ASSISTANT

## 2022-12-20 PROCEDURE — 3078F PR MOST RECENT DIASTOLIC BLOOD PRESSURE < 80 MM HG: ICD-10-PCS | Mod: CPTII,S$GLB,, | Performed by: PHYSICIAN ASSISTANT

## 2022-12-20 PROCEDURE — 1159F MED LIST DOCD IN RCRD: CPT | Mod: CPTII,S$GLB,, | Performed by: PHYSICIAN ASSISTANT

## 2022-12-20 PROCEDURE — 71046 X-RAY EXAM CHEST 2 VIEWS: CPT | Mod: 26,,, | Performed by: RADIOLOGY

## 2022-12-20 PROCEDURE — 1159F PR MEDICATION LIST DOCUMENTED IN MEDICAL RECORD: ICD-10-PCS | Mod: CPTII,S$GLB,, | Performed by: PHYSICIAN ASSISTANT

## 2022-12-20 PROCEDURE — 99213 PR OFFICE/OUTPT VISIT, EST, LEVL III, 20-29 MIN: ICD-10-PCS | Mod: S$GLB,,, | Performed by: PHYSICIAN ASSISTANT

## 2022-12-20 PROCEDURE — 1160F PR REVIEW ALL MEDS BY PRESCRIBER/CLIN PHARMACIST DOCUMENTED: ICD-10-PCS | Mod: CPTII,S$GLB,, | Performed by: PHYSICIAN ASSISTANT

## 2022-12-20 PROCEDURE — 3078F DIAST BP <80 MM HG: CPT | Mod: CPTII,S$GLB,, | Performed by: PHYSICIAN ASSISTANT

## 2022-12-20 PROCEDURE — 3074F SYST BP LT 130 MM HG: CPT | Mod: CPTII,S$GLB,, | Performed by: PHYSICIAN ASSISTANT

## 2022-12-20 PROCEDURE — 99999 PR PBB SHADOW E&M-EST. PATIENT-LVL IV: ICD-10-PCS | Mod: PBBFAC,,, | Performed by: PHYSICIAN ASSISTANT

## 2022-12-20 PROCEDURE — 99999 PR PBB SHADOW E&M-EST. PATIENT-LVL IV: CPT | Mod: PBBFAC,,, | Performed by: PHYSICIAN ASSISTANT

## 2022-12-20 PROCEDURE — 3008F PR BODY MASS INDEX (BMI) DOCUMENTED: ICD-10-PCS | Mod: CPTII,S$GLB,, | Performed by: PHYSICIAN ASSISTANT

## 2022-12-20 PROCEDURE — 71046 X-RAY EXAM CHEST 2 VIEWS: CPT | Mod: TC,FY

## 2022-12-20 RX ORDER — AMOXICILLIN AND CLAVULANATE POTASSIUM 875; 125 MG/1; MG/1
1 TABLET, FILM COATED ORAL 2 TIMES DAILY
Qty: 20 TABLET | Refills: 0 | Status: SHIPPED | OUTPATIENT
Start: 2022-12-20 | End: 2022-12-30

## 2022-12-20 RX ORDER — PROMETHAZINE HYDROCHLORIDE AND DEXTROMETHORPHAN HYDROBROMIDE 6.25; 15 MG/5ML; MG/5ML
5 SYRUP ORAL EVERY 8 HOURS PRN
Qty: 180 ML | Refills: 0 | Status: SHIPPED | OUTPATIENT
Start: 2022-12-20 | End: 2022-12-30

## 2022-12-20 NOTE — PROGRESS NOTES
Subjective:       Patient ID: Rios Buck is a 47 y.o. male.    Chief Complaint: Cough, Sore Throat, Fever, and Headache    Cough  This is a new problem. The current episode started in the past 7 days. The problem has been rapidly worsening. The problem occurs constantly. The cough is Non-productive. Associated symptoms include a fever, nasal congestion, postnasal drip, a sore throat and shortness of breath. Pertinent negatives include no chest pain, chills, ear congestion, headaches, myalgias, rhinorrhea or wheezing. Treatments tried: seen at - negative flu and covid, prescribed steroids and cough medication. There is no history of asthma, emphysema or environmental allergies.   Review of patient's allergies indicates:   Allergen Reactions    Bactrim [sulfamethoxazole-trimethoprim] Hives and Swelling    Doxycycline Other (See Comments)     constipation         Current Outpatient Medications:     acetaminophen (TYLENOL) 500 MG tablet, Take 1,000 mg by mouth every 6 (six) hours as needed for Pain., Disp: , Rfl:     diphenhydrAMINE (BENADRYL) 25 mg capsule, Take 1 capsule (25 mg total) by mouth every 6 (six) hours as needed for Itching (Hives, lip or tongue swelling)., Disp: 30 capsule, Rfl: 0    HYDROcodone-acetaminophen (NORCO) 5-325 mg per tablet, Take 1 tablet by mouth every 4 (four) hours as needed for Pain., Disp: 35 tablet, Rfl: 0    ondansetron (ZOFRAN-ODT) 4 MG TbDL, Take 2 tablets (8 mg total) by mouth every 8 (eight) hours as needed (nausea)., Disp: 25 tablet, Rfl: 0    aspirin 81 MG Chew, Take 1 tablet (81 mg total) by mouth 2 (two) times a day., Disp: 60 tablet, Rfl: 0    famotidine (PEPCID) 20 MG tablet, Take 1 tablet (20 mg total) by mouth 2 (two) times daily., Disp: 30 tablet, Rfl: 0  No current facility-administered medications for this visit.    Facility-Administered Medications Ordered in Other Visits:     mupirocin 2 % ointment, , Nasal, On Call Procedure, Los Lopez MD, Given at  03/26/21 0644    Lab Results   Component Value Date    WBC 14.26 (H) 04/22/2021    HGB 12.5 (L) 04/22/2021    HCT 36.6 (L) 04/22/2021     04/22/2021    CHOL 280 (H) 07/27/2017    TRIG 386 (H) 07/27/2017    HDL 56 07/27/2017    ALT 51 (H) 04/22/2021    AST 20 04/22/2021     04/22/2021    K 4.3 04/22/2021     04/22/2021    CREATININE 0.8 04/22/2021    BUN 14 04/22/2021    CO2 24 04/22/2021    TSH 0.431 01/31/2016    HGBA1C 5.4 04/16/2015       Review of Systems   Constitutional:  Positive for activity change, appetite change and fever. Negative for chills.   HENT:  Positive for postnasal drip and sore throat. Negative for rhinorrhea and sinus pressure.    Eyes:  Negative for visual disturbance.   Respiratory:  Positive for cough and shortness of breath. Negative for wheezing.    Cardiovascular:  Negative for chest pain.   Gastrointestinal:  Negative for abdominal distention and abdominal pain.   Genitourinary:  Negative for difficulty urinating and dysuria.   Musculoskeletal:  Negative for arthralgias and myalgias.   Allergic/Immunologic: Negative for environmental allergies.   Neurological:  Negative for headaches.   Hematological:  Negative for adenopathy.   Psychiatric/Behavioral:  The patient is not nervous/anxious.      Objective:      Physical Exam  Constitutional:       Appearance: Normal appearance.   HENT:      Head: Normocephalic and atraumatic.      Mouth/Throat:      Comments: Posterior oropharynx mildly erythematous  Eyes:      Conjunctiva/sclera: Conjunctivae normal.   Cardiovascular:      Rate and Rhythm: Normal rate and regular rhythm.   Pulmonary:      Effort: Pulmonary effort is normal. No respiratory distress.      Breath sounds: Normal breath sounds. No wheezing.      Comments: Patient coughing forcefully in exam room  Abdominal:      General: There is no distension.      Palpations: There is no mass.      Tenderness: There is no abdominal tenderness.   Musculoskeletal:       Right lower leg: No edema.      Left lower leg: No edema.   Skin:     Findings: No erythema.   Neurological:      Mental Status: He is alert and oriented to person, place, and time.   Psychiatric:         Behavior: Behavior normal.       Assessment:       1. Cough, unspecified type    2. Fever, unspecified fever cause    3. Acute bacterial bronchitis          Plan:       Rios was seen today for cough, sore throat, fever and headache.    Diagnoses and all orders for this visit:    Cough, unspecified type  -     X-Ray Chest PA And Lateral; Future  -     promethazine-dextromethorphan (PROMETHAZINE-DM) 6.25-15 mg/5 mL Syrp; Take 5 mLs by mouth every 8 (eight) hours as needed (cough). May cause drowsiness    Fever, unspecified fever cause  -     X-Ray Chest PA And Lateral; Future    Acute bacterial bronchitis  -     amoxicillin-clavulanate 875-125mg (AUGMENTIN) 875-125 mg per tablet; Take 1 tablet by mouth 2 (two) times daily. for 10 days      Take antibiotics with food.  Increase fluid intake.  Call the clinic if symptoms worsen, new symptoms develop or if you are not any better after completion of your antibiotics.

## 2022-12-20 NOTE — TELEPHONE ENCOUNTER
----- Message from Swapnil Michael sent at 12/19/2022 12:23 PM CST -----  Contact: pt at [phone]  Type:  Same Day Appointment Request    Caller is requesting a same day appointment.  Caller declined first available appointment listed below.      Name of Caller:  pt  When is the first available appointment?  N/A  Symptoms:  bad cough, sore throat  Best Call Back Number:  [phone]    Additional Information:   PLEASE CALL BACK AND ADVISE.

## 2023-09-12 ENCOUNTER — OFFICE VISIT (OUTPATIENT)
Dept: GASTROENTEROLOGY | Facility: CLINIC | Age: 48
End: 2023-09-12
Payer: COMMERCIAL

## 2023-09-12 VITALS
WEIGHT: 156.5 LBS | BODY MASS INDEX: 25.15 KG/M2 | SYSTOLIC BLOOD PRESSURE: 100 MMHG | DIASTOLIC BLOOD PRESSURE: 62 MMHG | HEART RATE: 77 BPM | HEIGHT: 66 IN

## 2023-09-12 DIAGNOSIS — R14.0 ABDOMINAL BLOATING: ICD-10-CM

## 2023-09-12 DIAGNOSIS — Z87.11 HISTORY OF PEPTIC ULCER: ICD-10-CM

## 2023-09-12 DIAGNOSIS — R19.4 CHANGE IN BOWEL HABITS: ICD-10-CM

## 2023-09-12 DIAGNOSIS — R19.5 DARK STOOLS: ICD-10-CM

## 2023-09-12 DIAGNOSIS — Z86.19 HISTORY OF HELICOBACTER PYLORI INFECTION: ICD-10-CM

## 2023-09-12 DIAGNOSIS — R19.8 BORBORYGMUS: ICD-10-CM

## 2023-09-12 DIAGNOSIS — R19.7 DIARRHEA, UNSPECIFIED TYPE: Primary | ICD-10-CM

## 2023-09-12 DIAGNOSIS — R10.9 ABDOMINAL CRAMPING: ICD-10-CM

## 2023-09-12 DIAGNOSIS — Z87.19 HISTORY OF GASTROESOPHAGEAL REFLUX (GERD): ICD-10-CM

## 2023-09-12 DIAGNOSIS — R14.3 EXCESSIVE FLATUS: ICD-10-CM

## 2023-09-12 DIAGNOSIS — Z87.19 HISTORY OF IBS: ICD-10-CM

## 2023-09-12 PROCEDURE — 3078F PR MOST RECENT DIASTOLIC BLOOD PRESSURE < 80 MM HG: ICD-10-PCS | Mod: CPTII,S$GLB,,

## 2023-09-12 PROCEDURE — 1159F PR MEDICATION LIST DOCUMENTED IN MEDICAL RECORD: ICD-10-PCS | Mod: CPTII,S$GLB,,

## 2023-09-12 PROCEDURE — 99999 PR PBB SHADOW E&M-EST. PATIENT-LVL IV: CPT | Mod: PBBFAC,,,

## 2023-09-12 PROCEDURE — 1160F RVW MEDS BY RX/DR IN RCRD: CPT | Mod: CPTII,S$GLB,,

## 2023-09-12 PROCEDURE — 1159F MED LIST DOCD IN RCRD: CPT | Mod: CPTII,S$GLB,,

## 2023-09-12 PROCEDURE — 99204 OFFICE O/P NEW MOD 45 MIN: CPT | Mod: S$GLB,,,

## 2023-09-12 PROCEDURE — 3008F PR BODY MASS INDEX (BMI) DOCUMENTED: ICD-10-PCS | Mod: CPTII,S$GLB,,

## 2023-09-12 PROCEDURE — 3074F PR MOST RECENT SYSTOLIC BLOOD PRESSURE < 130 MM HG: ICD-10-PCS | Mod: CPTII,S$GLB,,

## 2023-09-12 PROCEDURE — 3008F BODY MASS INDEX DOCD: CPT | Mod: CPTII,S$GLB,,

## 2023-09-12 PROCEDURE — 3078F DIAST BP <80 MM HG: CPT | Mod: CPTII,S$GLB,,

## 2023-09-12 PROCEDURE — 99204 PR OFFICE/OUTPT VISIT, NEW, LEVL IV, 45-59 MIN: ICD-10-PCS | Mod: S$GLB,,,

## 2023-09-12 PROCEDURE — 1160F PR REVIEW ALL MEDS BY PRESCRIBER/CLIN PHARMACIST DOCUMENTED: ICD-10-PCS | Mod: CPTII,S$GLB,,

## 2023-09-12 PROCEDURE — 3074F SYST BP LT 130 MM HG: CPT | Mod: CPTII,S$GLB,,

## 2023-09-12 PROCEDURE — 99999 PR PBB SHADOW E&M-EST. PATIENT-LVL IV: ICD-10-PCS | Mod: PBBFAC,,,

## 2023-09-12 RX ORDER — DICYCLOMINE HYDROCHLORIDE 10 MG/1
10 CAPSULE ORAL
Qty: 120 CAPSULE | Refills: 0 | Status: SHIPPED | OUTPATIENT
Start: 2023-09-12 | End: 2023-10-16

## 2023-09-12 RX ORDER — CHOLESTYRAMINE 4 G/9G
4 POWDER, FOR SUSPENSION ORAL DAILY
Qty: 90 PACKET | Refills: 0 | Status: CANCELLED | OUTPATIENT
Start: 2023-09-12 | End: 2023-12-11

## 2023-09-12 RX ORDER — CETIRIZINE HYDROCHLORIDE 10 MG/1
10 TABLET ORAL DAILY
COMMUNITY

## 2023-09-12 NOTE — PROGRESS NOTES
Subjective:       Patient ID: Rios Buck is a 48 y.o. male Body mass index is 25.26 kg/m².    Chief Complaint: Diarrhea    This patient is new to me.  Established patient of Dr. Adams, Dr. Valentine, and Kelsea Monsivais, GUILLERMO.     Diarrhea   This is a new (started 08/25/2023) problem. The current episode started 1 to 4 weeks ago. Episode frequency: Reports having up to 5 BMs a day; reports having a BM 15 minutes after eating. The problem has been unchanged. The stool consistency is described as Mucous (Rated stool mostly 7, but sometimes 6 on Ross scale). The patient states that diarrhea awakens him from sleep. Associated symptoms include abdominal pain (Denies currently; reports lower abdominal cramping that occurs prior to BMs daily; pain lasts until having a BM and then resolves after BMs; also worsens after eating), bloating and increased flatus. Pertinent negatives include no arthralgias, chills, coughing, fever, headaches, myalgias, sweats, URI, vomiting or weight loss. Associated symptoms comments: Patient also reports borborygmus.  He also had episode last week noticing dark watery stool. Nothing aggravates the symptoms. There are no known risk factors (Currently taking NSAIDs p.r.n. once daily for right elbow pain). He has tried anti-motility drug (Currently taking Imodium p.r.n.; past treatments: Bentyl years ago) for the symptoms. The treatment provided no relief. His past medical history is significant for irritable bowel syndrome. There is no history of bowel resection, inflammatory bowel disease, malabsorption, a recent abdominal surgery or short gut syndrome. History of GERD and PUD, but denies heartburn, reflux, indigestion, belching     Review of Systems   Constitutional:  Negative for activity change, appetite change, chills, diaphoresis, fatigue, fever, unexpected weight change and weight loss.   HENT:  Negative for sore throat and trouble swallowing.    Respiratory:  Negative for cough,  choking and shortness of breath.    Cardiovascular:  Negative for chest pain.   Gastrointestinal:  Positive for abdominal pain (Denies currently; reports lower abdominal cramping that occurs prior to BMs daily; pain lasts until having a BM and then resolves after BMs; also worsens after eating), bloating, diarrhea and flatus. Negative for abdominal distention, anal bleeding, blood in stool, constipation, nausea, rectal pain and vomiting.   Musculoskeletal:  Negative for arthralgias and myalgias.   Neurological:  Negative for headaches.       No LMP for male patient.  Past Medical History:   Diagnosis Date    Colon polyp     Diverticulosis     Epidermoid cyst of testis     left    H. pylori infection     Hydrocele, left     IBS (irritable bowel syndrome)     Peptic ulcer disease     Shoulder pain, right      Past Surgical History:   Procedure Laterality Date    ADENOIDECTOMY      ARTHROSCOPIC ACROMIOPLASTY OF SHOULDER Right 09/10/2020    Procedure: ACROMIOPLASTY, ARTHROSCOPIC;  Surgeon: Gagan Astorga MD;  Location: [facility] OR;  Service: Orthopedics;  Laterality: Right;    ARTHROSCOPIC DEBRIDEMENT OF SHOULDER Right 03/26/2021    Procedure: DEBRIDEMENT, SHOULDER, ARTHROSCOPIC;  Surgeon: Los Lopez MD;  Location: [facility];  Service: Orthopedics;  Laterality: Right;    COLONOSCOPY  06/2015    repeat in 5 years for surveillance    COLONOSCOPY N/A 06/04/2020    Procedure: COLONOSCOPY;  Surgeon: Sheila Adams MD;  Location: [facility];  Service: Endoscopy;  Laterality: N/A;    DISTAL CLAVICLE EXCISION Right 09/10/2020    Procedure: EXCISION, CLAVICLE, DISTAL;  Surgeon: Gagan Astorga MD;  Location: [facility] OR;  Service: Orthopedics;  Laterality: Right;    ESOPHAGOGASTRODUODENOSCOPY N/A 06/04/2020    Procedure: EGD (ESOPHAGOGASTRODUODENOSCOPY);  Surgeon: Sheila Adams MD;  Location: [facility] ENDO;  Service: Endoscopy;  Laterality: N/A;    FIXATION OF TENDON Right 03/26/2021    Procedure: FIXATION, TENDON -  RIGHT SHOULDER;  Surgeon: Los Lopez MD;  Location: [facility];  Service: Orthopedics;  Laterality: Right;    KNEE ARTHROSCOPY W/ DEBRIDEMENT  2012    right knee     KNEE ARTHROSCOPY W/ MENISCECTOMY Left 03/05/2021    Procedure: ARTHROSCOPY, KNEE, WITH MENISCECTOMY;  Surgeon: Los Lopez MD;  Location: [facility];  Service: Orthopedics;  Laterality: Left;    TONSILLECTOMY      UPPER GASTROINTESTINAL ENDOSCOPY  05/2015    repeat in 8 weeks to reassess ulcer for healing     Family History   Problem Relation Age of Onset    Diabetes Mother     Heart disease Mother 50        CABG x3    Cancer Maternal Grandfather 64        colon cancer    Colon cancer Maternal Grandfather 65    Crohn's disease Neg Hx     Ulcerative colitis Neg Hx     Celiac disease Neg Hx      Social History     Tobacco Use    Smoking status: Never    Smokeless tobacco: Never   Substance Use Topics    Alcohol use: No    Drug use: No     Wt Readings from Last 10 Encounters:   09/12/23 71 kg (156 lb 8.4 oz)   12/20/22 70.7 kg (155 lb 13.8 oz)   04/21/21 68 kg (150 lb)   03/23/21 68 kg (150 lb)   03/23/21 68 kg (150 lb)   03/03/21 68 kg (150 lb)   03/03/21 68 kg (150 lb)   09/25/20 70.8 kg (156 lb)   09/16/20 70.8 kg (156 lb)   09/10/20 70.8 kg (156 lb)     Lab Results   Component Value Date    WBC 14.26 (H) 04/22/2021    HGB 12.5 (L) 04/22/2021    HCT 36.6 (L) 04/22/2021    MCV 91 04/22/2021     04/22/2021     CMP  Sodium   Date Value Ref Range Status   04/22/2021 141 136 - 145 mmol/L Final     Potassium   Date Value Ref Range Status   04/22/2021 4.3 3.5 - 5.1 mmol/L Final     Chloride   Date Value Ref Range Status   04/22/2021 109 95 - 110 mmol/L Final     CO2   Date Value Ref Range Status   04/22/2021 24 23 - 29 mmol/L Final     Glucose   Date Value Ref Range Status   04/22/2021 145 (H) 70 - 110 mg/dL Final     BUN   Date Value Ref Range Status   04/22/2021 14 6 - 20 mg/dL Final     Creatinine   Date Value Ref Range Status   04/22/2021  0.8 0.5 - 1.4 mg/dL Final     Calcium   Date Value Ref Range Status   04/22/2021 8.7 8.7 - 10.5 mg/dL Final     Total Protein   Date Value Ref Range Status   04/22/2021 6.0 6.0 - 8.4 g/dL Final     Albumin   Date Value Ref Range Status   04/22/2021 3.5 3.5 - 5.2 g/dL Final     Total Bilirubin   Date Value Ref Range Status   04/22/2021 0.2 0.1 - 1.0 mg/dL Final     Comment:     For infants and newborns, interpretation of results should be based  on gestational age, weight and in agreement with clinical  observations.    Premature Infant recommended reference ranges:  Up to 24 hours.............<8.0 mg/dL  Up to 48 hours............<12.0 mg/dL  3-5 days..................<15.0 mg/dL  6-29 days.................<15.0 mg/dL       Alkaline Phosphatase   Date Value Ref Range Status   04/22/2021 59 55 - 135 U/L Final     AST   Date Value Ref Range Status   04/22/2021 20 10 - 40 U/L Final     ALT   Date Value Ref Range Status   04/22/2021 51 (H) 10 - 44 U/L Final     Anion Gap   Date Value Ref Range Status   04/22/2021 8 8 - 16 mmol/L Final     eGFR if    Date Value Ref Range Status   04/22/2021 >60 >60 mL/min/1.73 m^2 Final     eGFR if non    Date Value Ref Range Status   04/22/2021 >60 >60 mL/min/1.73 m^2 Final     Comment:     Calculation used to obtain the estimated glomerular filtration  rate (eGFR) is the CKD-EPI equation.        Lab Results   Component Value Date    AMYLASE 22 01/31/2016     Lab Results   Component Value Date    LIPASE 25 01/31/2016     Lab Results   Component Value Date    TSH 0.431 01/31/2016     Reviewed prior medical records including radiology report of chest x-ray 12/20/2022, abdominal ultrasound 06/10/2020, CT abdomen and pelvis 05/01/2017 07/14/2016 & endoscopy history (see surgical history).    Objective:      Physical Exam  Vitals and nursing note reviewed.   Constitutional:       General: He is not in acute distress.     Appearance: Normal appearance. He is  not ill-appearing.   HENT:      Mouth/Throat:      Lips: Pink. No lesions.   Cardiovascular:      Rate and Rhythm: Normal rate and regular rhythm.      Pulses: Normal pulses.      Heart sounds: Normal heart sounds.   Pulmonary:      Effort: Pulmonary effort is normal. No respiratory distress.      Breath sounds: Normal breath sounds.   Abdominal:      General: Abdomen is flat. Bowel sounds are normal. There is no distension or abdominal bruit. There are no signs of injury.      Palpations: Abdomen is soft. There is no shifting dullness, fluid wave, hepatomegaly, splenomegaly or mass.      Tenderness: There is abdominal tenderness (mild) in the suprapubic area. There is no guarding or rebound. Negative signs include Ty's sign, Rovsing's sign and McBurney's sign.   Skin:     General: Skin is warm and dry.      Coloration: Skin is not jaundiced or pale.   Neurological:      Mental Status: He is alert and oriented to person, place, and time.   Psychiatric:         Attention and Perception: Attention normal.         Mood and Affect: Mood normal.         Speech: Speech normal.         Behavior: Behavior normal.         Assessment:       1. Diarrhea, unspecified type    2. Change in bowel habits    3. Abdominal cramping    4. History of IBS    5. Excessive flatus    6. Abdominal bloating    7. Borborygmus    8. Dark stools    9. History of Helicobacter pylori infection    10. History of peptic ulcer    11. History of gastroesophageal reflux (GERD)        Plan:       Diarrhea, unspecified type  - schedule Colonoscopy, discussed procedure with the patient, including risks and benefits, patient verbalized understanding  - recommend OTC probiotic, such as Florastor or Culturelle, taken as directed on packaging  - avoid lactose, alcohol, & caffeine  - avoid known triggers  - Recommended increase fiber in diet, especially soluble fiber since this can help bulk up the stool consistency and may help to slow down how fast the  stool goes through the colon and can prevent diarrhea  -     H. pylori antigen, stool; Future; Expected date: 09/12/2023  -     Occult blood x 1, stool; Future; Expected date: 09/12/2023  -     WBC, Stool; Future; Expected date: 09/12/2023  -     Rotavirus antigen, stool; Future; Expected date: 09/12/2023  -     Adenovirus Molecular Detection, PCR, Non-Blood Stool; Future; Expected date: 09/12/2023  -     Giardia / Cryptosporidum, EIA; Future; Expected date: 09/12/2023  -     Stool Exam-Ova,Cysts,Parasites; Future; Expected date: 09/12/2023  -     Clostridium difficile EIA; Future; Expected date: 09/12/2023  -     Stool culture; Future; Expected date: 09/12/2023  -START: dicyclomine (BENTYL) 10 MG capsule; Take 1 capsule (10 mg total) by mouth 4 (four) times daily before meals and nightly.  Dispense: 120 capsule; Refill: 0  -     CBC Without Differential; Future; Expected date: 09/12/2023  -     Comprehensive Metabolic Panel; Future; Expected date: 09/12/2023  -     TSH; Future; Expected date: 09/12/2023  -     Case Request Endoscopy: COLONOSCOPY  -     TISSUE TRANSGLUTAMINASE (TTG), IGA; Future; Expected date: 09/12/2023  -     IGA; Future; Expected date: 09/12/2023  - consider cholestyramine     Change in bowel habits  - schedule Colonoscopy, discussed procedure with the patient, including risks and benefits, patient verbalized understanding  -     Case Request Endoscopy: COLONOSCOPY    Abdominal cramping  - schedule Colonoscopy, discussed procedure with the patient, including risks and benefits, patient verbalized understanding  -START: dicyclomine (BENTYL) 10 MG capsule; Take 1 capsule (10 mg total) by mouth 4 (four) times daily before meals and nightly.  Dispense: 120 capsule; Refill: 0  -     Case Request Endoscopy: COLONOSCOPY    History of IBS  - schedule Colonoscopy, discussed procedure with the patient, including risks and benefits, patient verbalized understanding  -START: dicyclomine (BENTYL) 10 MG  capsule; Take 1 capsule (10 mg total) by mouth 4 (four) times daily before meals and nightly.  Dispense: 120 capsule; Refill: 0  -     Case Request Endoscopy: COLONOSCOPY    Excessive flatus & Abdominal bloating  - schedule Colonoscopy, discussed procedure with the patient, including risks and benefits, patient verbalized understanding  -     H. pylori antigen, stool; Future; Expected date: 09/12/2023  -     Case Request Endoscopy: COLONOSCOPY  -     TISSUE TRANSGLUTAMINASE (TTG), IGA; Future; Expected date: 09/12/2023  -     IGA; Future; Expected date: 09/12/2023    Borborygmus  - schedule Colonoscopy, discussed procedure with the patient, including risks and benefits, patient verbalized understanding  -     Case Request Endoscopy: COLONOSCOPY    Dark stools  -     Occult blood x 1, stool; Future; Expected date: 09/12/2023  -Avoid/limit use of NSAID's, since they can cause GI upset, bleeding, and/or ulcers. If needed, take with food.  - if positive schedule EGD    History of Helicobacter pylori infection  -     H. pylori antigen, stool; Future; Expected date: 09/12/2023    History of peptic ulcer  -     H. pylori antigen, stool; Future; Expected date: 09/12/2023  -Avoid/limit use of NSAID's, since they can cause GI upset, bleeding, and/or ulcers. If needed, take with food.    History of gastroesophageal reflux (GERD)  -Avoid large meals, avoid eating within 2-3 hours of bedtime (avoid late night eating & lying down soon after eating), elevate head of bed if nocturnal symptoms are present, smoking cessation (if current smoker), & weight loss (if overweight).   -Avoid known foods which trigger reflux symptoms & to minimize/avoid high-fat foods, chocolate, caffeine, citrus, alcohol, & tomato products.  -Avoid/limit use of NSAID's, since they can cause GI upset, bleeding, and/or ulcers. If needed, take with food.    Follow up in about 4 weeks (around 10/10/2023), or if symptoms worsen or fail to improve.      If no  improvement in symptoms or symptoms worsen, call/follow-up at clinic or go to ER.        45 minutes of total time spent on the encounter, which includes face to face time and non-face to face time preparing to see the patient (eg, review of tests), Obtaining and/or reviewing separately obtained history, Documenting clinical information in the electronic or other health record, Independently interpreting results (not separately reported) and communicating results to the patient/family/caregiver, or Care coordination (not separately reported).     A dictation software program was used for this note. Please expect some simple typographical  errors in this note.

## 2023-09-13 ENCOUNTER — TELEPHONE (OUTPATIENT)
Dept: GASTROENTEROLOGY | Facility: CLINIC | Age: 48
End: 2023-09-13
Payer: COMMERCIAL

## 2023-09-13 ENCOUNTER — PATIENT MESSAGE (OUTPATIENT)
Dept: ENDOSCOPY | Facility: HOSPITAL | Age: 48
End: 2023-09-13

## 2023-09-13 ENCOUNTER — LAB VISIT (OUTPATIENT)
Dept: LAB | Facility: HOSPITAL | Age: 48
End: 2023-09-13
Payer: COMMERCIAL

## 2023-09-13 DIAGNOSIS — R19.7 DIARRHEA, UNSPECIFIED TYPE: ICD-10-CM

## 2023-09-13 DIAGNOSIS — R14.0 ABDOMINAL BLOATING: ICD-10-CM

## 2023-09-13 LAB
ALBUMIN SERPL BCP-MCNC: 4.2 G/DL (ref 3.5–5.2)
ALP SERPL-CCNC: 49 U/L (ref 55–135)
ALT SERPL W/O P-5'-P-CCNC: 37 U/L (ref 10–44)
ANION GAP SERPL CALC-SCNC: 14 MMOL/L (ref 8–16)
AST SERPL-CCNC: 22 U/L (ref 10–40)
BILIRUB SERPL-MCNC: 0.9 MG/DL (ref 0.1–1)
BUN SERPL-MCNC: 14 MG/DL (ref 6–20)
CALCIUM SERPL-MCNC: 9 MG/DL (ref 8.7–10.5)
CHLORIDE SERPL-SCNC: 104 MMOL/L (ref 95–110)
CO2 SERPL-SCNC: 24 MMOL/L (ref 23–29)
CREAT SERPL-MCNC: 1 MG/DL (ref 0.5–1.4)
ERYTHROCYTE [DISTWIDTH] IN BLOOD BY AUTOMATED COUNT: 12.3 % (ref 11.5–14.5)
EST. GFR  (NO RACE VARIABLE): >60 ML/MIN/1.73 M^2
GLUCOSE SERPL-MCNC: 82 MG/DL (ref 70–110)
HCT VFR BLD AUTO: 42.5 % (ref 40–54)
HGB BLD-MCNC: 13.9 G/DL (ref 14–18)
MCH RBC QN AUTO: 28.8 PG (ref 27–31)
MCHC RBC AUTO-ENTMCNC: 32.7 G/DL (ref 32–36)
MCV RBC AUTO: 88 FL (ref 82–98)
PLATELET # BLD AUTO: 179 K/UL (ref 150–450)
PMV BLD AUTO: 10 FL (ref 9.2–12.9)
POTASSIUM SERPL-SCNC: 3.7 MMOL/L (ref 3.5–5.1)
PROT SERPL-MCNC: 7 G/DL (ref 6–8.4)
RBC # BLD AUTO: 4.82 M/UL (ref 4.6–6.2)
SODIUM SERPL-SCNC: 142 MMOL/L (ref 136–145)
TSH SERPL DL<=0.005 MIU/L-ACNC: 1.19 UIU/ML (ref 0.4–4)
WBC # BLD AUTO: 6.47 K/UL (ref 3.9–12.7)

## 2023-09-13 PROCEDURE — 80053 COMPREHEN METABOLIC PANEL: CPT

## 2023-09-13 PROCEDURE — 86364 TISS TRNSGLTMNASE EA IG CLAS: CPT

## 2023-09-13 PROCEDURE — 82784 ASSAY IGA/IGD/IGG/IGM EACH: CPT

## 2023-09-13 PROCEDURE — 85027 COMPLETE CBC AUTOMATED: CPT

## 2023-09-13 PROCEDURE — 84443 ASSAY THYROID STIM HORMONE: CPT

## 2023-09-13 PROCEDURE — 36415 COLL VENOUS BLD VENIPUNCTURE: CPT

## 2023-09-13 NOTE — TELEPHONE ENCOUNTER
----- Message from Kim Eubanks sent at 9/13/2023  8:55 AM CDT -----  Contact: Joel  Type:  Needs Medical Advice    Who Called: Joel with the lab    Would the patient rather a call back or a response via [facility]? Call     Best Call Back Number: [phone]    Additional Information: Joel with the lab would like to speak with the nurse.     Please call to advise

## 2023-09-13 NOTE — TELEPHONE ENCOUNTER
Returned call to Joel in Lab. He stated pt dropped off a formed stool so he cancelled the C. Diff order. No further issues noted.

## 2023-09-14 LAB — IGA SERPL-MCNC: 112 MG/DL (ref 61–356)

## 2023-09-15 LAB — TTG IGA SER-ACNC: 0.2 U/ML

## 2023-10-04 ENCOUNTER — OFFICE VISIT (OUTPATIENT)
Dept: ORTHOPEDICS | Facility: CLINIC | Age: 48
End: 2023-10-04
Payer: COMMERCIAL

## 2023-10-04 ENCOUNTER — HOSPITAL ENCOUNTER (OUTPATIENT)
Dept: RADIOLOGY | Facility: HOSPITAL | Age: 48
Discharge: HOME | End: 2023-10-04
Attending: ORTHOPAEDIC SURGERY
Payer: COMMERCIAL

## 2023-10-04 VITALS — WEIGHT: 156 LBS | RESPIRATION RATE: 16 BRPM | HEIGHT: 66 IN | BODY MASS INDEX: 25.07 KG/M2

## 2023-10-04 DIAGNOSIS — M25.521 RIGHT ELBOW PAIN: Primary | ICD-10-CM

## 2023-10-04 DIAGNOSIS — M25.521 RIGHT ELBOW PAIN: ICD-10-CM

## 2023-10-04 DIAGNOSIS — M77.11 RIGHT LATERAL EPICONDYLITIS: Primary | ICD-10-CM

## 2023-10-04 PROCEDURE — 99204 PR OFFICE/OUTPT VISIT, NEW, LEVL IV, 45-59 MIN: ICD-10-PCS | Mod: 25,S$GLB,, | Performed by: ORTHOPAEDIC SURGERY

## 2023-10-04 PROCEDURE — 20551 TENDON ORIGIN: R ELBOW: ICD-10-PCS | Mod: RT,S$GLB,, | Performed by: ORTHOPAEDIC SURGERY

## 2023-10-04 PROCEDURE — 1160F RVW MEDS BY RX/DR IN RCRD: CPT | Mod: CPTII,S$GLB,, | Performed by: ORTHOPAEDIC SURGERY

## 2023-10-04 PROCEDURE — 3008F BODY MASS INDEX DOCD: CPT | Mod: CPTII,S$GLB,, | Performed by: ORTHOPAEDIC SURGERY

## 2023-10-04 PROCEDURE — 1160F PR REVIEW ALL MEDS BY PRESCRIBER/CLIN PHARMACIST DOCUMENTED: ICD-10-PCS | Mod: CPTII,S$GLB,, | Performed by: ORTHOPAEDIC SURGERY

## 2023-10-04 PROCEDURE — 99999 PR PBB SHADOW E&M-EST. PATIENT-LVL III: CPT | Mod: PBBFAC,,, | Performed by: ORTHOPAEDIC SURGERY

## 2023-10-04 PROCEDURE — 20551 NJX 1 TENDON ORIGIN/INSJ: CPT | Mod: RT,S$GLB,, | Performed by: ORTHOPAEDIC SURGERY

## 2023-10-04 PROCEDURE — 73080 XR ELBOW COMPLETE 3 VIEW RIGHT: ICD-10-PCS | Mod: 26,RT,, | Performed by: RADIOLOGY

## 2023-10-04 PROCEDURE — 73080 X-RAY EXAM OF ELBOW: CPT | Mod: 26,RT,, | Performed by: RADIOLOGY

## 2023-10-04 PROCEDURE — 99999 PR PBB SHADOW E&M-EST. PATIENT-LVL III: ICD-10-PCS | Mod: PBBFAC,,, | Performed by: ORTHOPAEDIC SURGERY

## 2023-10-04 PROCEDURE — 1159F PR MEDICATION LIST DOCUMENTED IN MEDICAL RECORD: ICD-10-PCS | Mod: CPTII,S$GLB,, | Performed by: ORTHOPAEDIC SURGERY

## 2023-10-04 PROCEDURE — 73080 X-RAY EXAM OF ELBOW: CPT | Mod: TC,PO,RT

## 2023-10-04 PROCEDURE — 1159F MED LIST DOCD IN RCRD: CPT | Mod: CPTII,S$GLB,, | Performed by: ORTHOPAEDIC SURGERY

## 2023-10-04 PROCEDURE — 3008F PR BODY MASS INDEX (BMI) DOCUMENTED: ICD-10-PCS | Mod: CPTII,S$GLB,, | Performed by: ORTHOPAEDIC SURGERY

## 2023-10-04 PROCEDURE — 99204 OFFICE O/P NEW MOD 45 MIN: CPT | Mod: 25,S$GLB,, | Performed by: ORTHOPAEDIC SURGERY

## 2023-10-04 RX ORDER — MELOXICAM 15 MG/1
15 TABLET ORAL DAILY
Qty: 30 TABLET | Refills: 3 | Status: SHIPPED | OUTPATIENT
Start: 2023-10-04 | End: 2023-11-29

## 2023-10-04 RX ORDER — TRIAMCINOLONE ACETONIDE 40 MG/ML
40 INJECTION, SUSPENSION INTRA-ARTICULAR; INTRAMUSCULAR
Status: DISCONTINUED | OUTPATIENT
Start: 2023-10-04 | End: 2023-10-04 | Stop reason: HOSPADM

## 2023-10-04 RX ADMIN — TRIAMCINOLONE ACETONIDE 40 MG: 40 INJECTION, SUSPENSION INTRA-ARTICULAR; INTRAMUSCULAR at 03:10

## 2023-10-04 NOTE — PROCEDURES
Tendon Origin: R elbow    Date/Time: 10/4/2023 3:00 PM    Performed by: Andrew Vyas MD  Authorized by: Andrew yVas MD    Consent Done?:  Yes (Verbal)  Timeout: prior to procedure the correct patient, procedure, and site was verified    Indications:  Pain  Site marked: the procedure site was marked    Timeout: prior to procedure the correct patient, procedure, and site was verified    Location:  Elbow  Site:  R elbow  Prep: patient was prepped and draped in usual sterile fashion    Ultrasonic Guidance for Needle Placement?: No    Needle size:  22 G  Approach:  Anterolateral  Medications:  40 mg triamcinolone acetonide 40 mg/mL  Patient tolerance:  Patient tolerated the procedure well with no immediate complications

## 2023-10-04 NOTE — PROGRESS NOTES
Subjective:      Patient ID: Rios Buck is a 48 y.o. male.    Chief Complaint: Pain of the Right Elbow    HPI  48-year-old right-hand dominant male  with a 3 to four-month history of right elbow pain.  He states that he does lots of free weights in his having pain with some of his weightlifting.  Denies any specific trauma.  He is had no specific treatment or other than relative rest  ROS      Objective:    Ortho Exam       Constitutional:   Patient is alert  and oriented in no acute distress  HEENT:  normocephalic atraumatic; PERRL EOMI  Neck:  Supple without adenopathy  Cardiovascular:  Normal rate and rhythm  Pulmonary:  Normal respiratory effort normal chest wall expansion  Abdominal:  Nonprotuberant nondistended  Musculoskeletal:  Patient has adequate cervical shoulder and elbow range of motion.  There is a bit of difficulty  With full elbow extension.  Moderate tenderness over the lateral epicondyle.  There is also some mild discomfort with palpation down into the proximal portion of the forearm.  Positive pain with EDC extension against resistance.  Intact flexor and extensor tendon function.  Intact skin, sensation, and brisk capillary refill of all digits.  Negative Tinel's at the elbow.  There is no increased warmth or erythema.  No elbow instability  Neurological:  No focal defect; cranial nerves 2-12 grossly intact  Psychiatric/behavioral:  Mood and behavior normal      X-Ray Elbow Complete 3 views Right  Narrative: EXAMINATION:  XR ELBOW COMPLETE 3 VIEW RIGHT    CLINICAL HISTORY:  . Pain in right elbow    TECHNIQUE:  AP, lateral, and oblique views of the right elbow were performed.    COMPARISON:  None    FINDINGS:  A fracture of the humerus, radius or ulna is not seen.  Dislocation is not noted.  A joint effusion is not seen.  Impression: Negative x-rays of the right elbow.    Electronically signed by: Cain Urias MD  Date:    10/04/2023  Time:    14:25       My  Radiographs Findings:    I have personally reviewed radiographs and concur with above findings    Assessment:       Encounter Diagnosis   Name Primary?    Right lateral epicondylitis Yes         Plan:       I have discussed medical condition and treatment options him at length.  After a verbal consent and sterile prep I injected him with the point of maximum tenderness without complication with a combination of cc of Kenalog several cc of lidocaine.  We discussed a trial of generalized activity restrictions rehab exercises which we have provided for him.  Mobic GI cardiac and renal precautions were discussed in a tennis elbow strap.  Follow up in 4-6 weeks if symptoms fail to improve I will see him sooner if any questions or problems.  We have offered formal therapy but that was declined.        Past Medical History:   Diagnosis Date    Colon polyp     Diverticulosis     Epidermoid cyst of testis     left    H. pylori infection     Hydrocele, left     IBS (irritable bowel syndrome)     Peptic ulcer disease     Shoulder pain, right      Past Surgical History:   Procedure Laterality Date    ADENOIDECTOMY      ARTHROSCOPIC ACROMIOPLASTY OF SHOULDER Right 09/10/2020    Procedure: ACROMIOPLASTY, ARTHROSCOPIC;  Surgeon: Gagan Astorga MD;  Location: [facility] OR;  Service: Orthopedics;  Laterality: Right;    ARTHROSCOPIC DEBRIDEMENT OF SHOULDER Right 03/26/2021    Procedure: DEBRIDEMENT, SHOULDER, ARTHROSCOPIC;  Surgeon: Los Lopez MD;  Location: [facility] OR;  Service: Orthopedics;  Laterality: Right;    COLONOSCOPY  06/2015    repeat in 5 years for surveillance    COLONOSCOPY N/A 06/04/2020    Procedure: COLONOSCOPY;  Surgeon: Sheila Adams MD;  Location: [facility];  Service: Endoscopy;  Laterality: N/A;    DISTAL CLAVICLE EXCISION Right 09/10/2020    Procedure: EXCISION, CLAVICLE, DISTAL;  Surgeon: Gagan Astorga MD;  Location: [facility] OR;  Service: Orthopedics;  Laterality: Right;     ESOPHAGOGASTRODUODENOSCOPY N/A 06/04/2020    Procedure: EGD (ESOPHAGOGASTRODUODENOSCOPY);  Surgeon: Sheila Adams MD;  Location: [facility];  Service: Endoscopy;  Laterality: N/A;    FIXATION OF TENDON Right 03/26/2021    Procedure: FIXATION, TENDON - RIGHT SHOULDER;  Surgeon: Los Lopez MD;  Location: [facility];  Service: Orthopedics;  Laterality: Right;    KNEE ARTHROSCOPY W/ DEBRIDEMENT  2012    right knee     KNEE ARTHROSCOPY W/ MENISCECTOMY Left 03/05/2021    Procedure: ARTHROSCOPY, KNEE, WITH MENISCECTOMY;  Surgeon: Los Lopez MD;  Location: [facility];  Service: Orthopedics;  Laterality: Left;    TONSILLECTOMY      UPPER GASTROINTESTINAL ENDOSCOPY  05/2015    repeat in 8 weeks to reassess ulcer for healing         Current Outpatient Medications:     acetaminophen (TYLENOL) 500 MG tablet, Take 1,000 mg by mouth every 6 (six) hours as needed for Pain., Disp: , Rfl:     cetirizine (ZYRTEC) 10 MG tablet, Take 10 mg by mouth once daily., Disp: , Rfl:     dicyclomine (BENTYL) 10 MG capsule, Take 1 capsule (10 mg total) by mouth 4 (four) times daily before meals and nightly., Disp: 120 capsule, Rfl: 0    Lactobacillus rhamnosus GG (CULTURELLE) 10 billion cell capsule, Take 1 capsule by mouth once daily., Disp: , Rfl:     meloxicam (MOBIC) 15 MG tablet, Take 1 tablet (15 mg total) by mouth once daily., Disp: 30 tablet, Rfl: 3  No current facility-administered medications for this visit.    Facility-Administered Medications Ordered in Other Visits:     mupirocin 2 % ointment, , Nasal, On Call Procedure, Los Lopez MD, Given at 03/26/21 0644    Review of patient's allergies indicates:   Allergen Reactions    Bactrim [sulfamethoxazole-trimethoprim] Hives and Swelling    Doxycycline Other (See Comments)     constipation       Family History   Problem Relation Age of Onset    Diabetes Mother     Heart disease Mother 50        CABG x3    Cancer Maternal Grandfather 64        colon cancer    Colon  cancer Maternal Grandfather 65    Crohn's disease Neg Hx     Ulcerative colitis Neg Hx     Celiac disease Neg Hx      Social History     Occupational History    Not on file   Tobacco Use    Smoking status: Never    Smokeless tobacco: Never   Substance and Sexual Activity    Alcohol use: No    Drug use: No    Sexual activity: Not on file

## 2023-10-13 ENCOUNTER — ANESTHESIA (OUTPATIENT)
Dept: ENDOSCOPY | Facility: HOSPITAL | Age: 48
End: 2023-10-13
Payer: COMMERCIAL

## 2023-10-13 ENCOUNTER — HOSPITAL ENCOUNTER (OUTPATIENT)
Facility: HOSPITAL | Age: 48
Discharge: HOME | End: 2023-10-13
Attending: INTERNAL MEDICINE | Admitting: INTERNAL MEDICINE
Payer: COMMERCIAL

## 2023-10-13 ENCOUNTER — ANESTHESIA EVENT (OUTPATIENT)
Dept: ENDOSCOPY | Facility: HOSPITAL | Age: 48
End: 2023-10-13
Payer: COMMERCIAL

## 2023-10-13 VITALS
DIASTOLIC BLOOD PRESSURE: 66 MMHG | RESPIRATION RATE: 16 BRPM | HEART RATE: 56 BPM | WEIGHT: 145 LBS | TEMPERATURE: 98 F | OXYGEN SATURATION: 100 % | BODY MASS INDEX: 23.3 KG/M2 | HEIGHT: 66 IN | SYSTOLIC BLOOD PRESSURE: 102 MMHG

## 2023-10-13 DIAGNOSIS — R19.4 CHANGE IN BOWEL HABITS: ICD-10-CM

## 2023-10-13 PROCEDURE — 63600175 PHARM REV CODE 636 W HCPCS: Performed by: NURSE ANESTHETIST, CERTIFIED REGISTERED

## 2023-10-13 PROCEDURE — 27201012 HC FORCEPS, HOT/COLD, DISP: Performed by: INTERNAL MEDICINE

## 2023-10-13 PROCEDURE — 45380 COLONOSCOPY AND BIOPSY: CPT | Performed by: INTERNAL MEDICINE

## 2023-10-13 PROCEDURE — 45380 PR COLONOSCOPY,BIOPSY: ICD-10-PCS | Mod: ,,, | Performed by: INTERNAL MEDICINE

## 2023-10-13 PROCEDURE — D9220A PRA ANESTHESIA: ICD-10-PCS | Mod: ANES,,, | Performed by: ANESTHESIOLOGY

## 2023-10-13 PROCEDURE — 37000009 HC ANESTHESIA EA ADD 15 MINS: Performed by: INTERNAL MEDICINE

## 2023-10-13 PROCEDURE — D9220A PRA ANESTHESIA: Mod: ANES,,, | Performed by: ANESTHESIOLOGY

## 2023-10-13 PROCEDURE — D9220A PRA ANESTHESIA: Mod: CRNA,,, | Performed by: NURSE ANESTHETIST, CERTIFIED REGISTERED

## 2023-10-13 PROCEDURE — 37000008 HC ANESTHESIA 1ST 15 MINUTES: Performed by: INTERNAL MEDICINE

## 2023-10-13 PROCEDURE — 25000003 PHARM REV CODE 250: Performed by: INTERNAL MEDICINE

## 2023-10-13 PROCEDURE — D9220A PRA ANESTHESIA: ICD-10-PCS | Mod: CRNA,,, | Performed by: NURSE ANESTHETIST, CERTIFIED REGISTERED

## 2023-10-13 PROCEDURE — 25000003 PHARM REV CODE 250: Performed by: NURSE ANESTHETIST, CERTIFIED REGISTERED

## 2023-10-13 PROCEDURE — 45380 COLONOSCOPY AND BIOPSY: CPT | Mod: ,,, | Performed by: INTERNAL MEDICINE

## 2023-10-13 RX ORDER — SODIUM CHLORIDE 9 MG/ML
INJECTION, SOLUTION INTRAVENOUS CONTINUOUS
Status: DISCONTINUED | OUTPATIENT
Start: 2023-10-13 | End: 2023-10-13 | Stop reason: HOSPADM

## 2023-10-13 RX ORDER — LIDOCAINE HYDROCHLORIDE 20 MG/ML
INJECTION INTRAVENOUS
Status: DISCONTINUED | OUTPATIENT
Start: 2023-10-13 | End: 2023-10-13

## 2023-10-13 RX ORDER — PROPOFOL 10 MG/ML
VIAL (ML) INTRAVENOUS
Status: DISCONTINUED | OUTPATIENT
Start: 2023-10-13 | End: 2023-10-13

## 2023-10-13 RX ADMIN — PROPOFOL 30 MG: 10 INJECTION, EMULSION INTRAVENOUS at 01:10

## 2023-10-13 RX ADMIN — LIDOCAINE HYDROCHLORIDE 75 MG: 20 INJECTION, SOLUTION INTRAVENOUS at 01:10

## 2023-10-13 RX ADMIN — PROPOFOL 100 MG: 10 INJECTION, EMULSION INTRAVENOUS at 01:10

## 2023-10-13 RX ADMIN — SODIUM CHLORIDE: 9 INJECTION, SOLUTION INTRAVENOUS at 11:10

## 2023-10-13 NOTE — PROVATION PATIENT INSTRUCTIONS
Discharge Summary/Instructions after an Endoscopic Procedure  Patient Name: Rios Buck  Patient MRN: [id number]  Patient YOB: 1975 Friday, October 13, 2023  Sheila Adams MD  Dear patient,  As a result of recent federal legislation (The Federal Cures Act), you may   receive lab or pathology results from your procedure in your [facility]   account before your physician is able to contact you. Your physician or   their representative will relay the results to you with their   recommendations at their soonest availability.  Thank you,  RESTRICTIONS:  During your procedure today, you received medications for sedation.  These   medications may affect your judgment, balance and coordination.  Therefore,   for 24 hours, you have the following restrictions:   - DO NOT drive a car, operate machinery, make legal/financial decisions,   sign important papers or drink alcohol.    ACTIVITY:  Today: no heavy lifting, straining or running due to procedural   sedation/anesthesia.  The following day: return to full activity including work.  DIET:  Eat and drink normally unless instructed otherwise.     TREATMENT FOR COMMON SIDE EFFECTS:  - Mild abdominal pain, nausea, belching, bloating or excessive gas:  rest,   eat lightly and use a heating pad.  - Sore Throat: treat with throat lozenges and/or gargle with warm salt   water.  - Because air was used during the procedure, expelling large amounts of air   from your rectum or belching is normal.  - If a bowel prep was taken, you may not have a bowel movement for 1-3 days.    This is normal.  SYMPTOMS TO WATCH FOR AND REPORT TO YOUR PHYSICIAN:  1. Abdominal pain or bloating, other than gas cramps.  2. Chest pain.  3. Back pain.  4. Signs of infection such as: chills or fever occurring within 24 hours   after the procedure.  5. Rectal bleeding, which would show as bright red, maroon, or black stools.   (A tablespoon of blood from the rectum is not serious,  especially if   hemorrhoids are present.)  6. Vomiting.  7. Weakness or dizziness.  GO DIRECTLY TO THE NEAREST EMERGENCY ROOM IF YOU HAVE ANY OF THE FOLLOWING:      Difficulty breathing              Chills and/or fever over 101 F   Persistent vomiting and/or vomiting blood   Severe abdominal pain   Severe chest pain   Black, tarry stools   Bleeding- more than one tablespoon   Any other symptom or condition that you feel may need urgent attention  Your doctor recommends these additional instructions:  If any biopsies were taken, your doctors clinic will contact you in 1 to 2   weeks with any results.  - Discharge patient to home (with escort).   - Patient has a contact number available for emergencies.  The signs and   symptoms of potential delayed complications were discussed with the   patient.  Return to normal activities tomorrow.  Written discharge   instructions were provided to the patient.   - Resume previous diet.   - Continue present medications.   - Await pathology results.   - Repeat colonoscopy in 10 years for screening purposes.   -If biopsies unremarkable consider trial of bile acid sequestrant  - Return to my office PRN.  For questions, problems or results please call your physician - Sheila Adams MD at Work:  [phone].  [facility], EMERGENCY ROOM PHONE NUMBER: [phone]  IF A COMPLICATION OR EMERGENCY SITUATION ARISES AND YOU ARE UNABLE TO REACH   YOUR PHYSICIAN - GO DIRECTLY TO THE EMERGENCY ROOM.  Sheila Adams MD  10/13/2023 1:24:54 PM  This report has been verified and signed electronically.  Dear patient,  As a result of recent federal legislation (The Federal Cures Act), you may   receive lab or pathology results from your procedure in your [facility]   account before your physician is able to contact you. Your physician or   their representative will relay the results to you with their   recommendations at their soonest availability.  Thank  you,  PROVATION

## 2023-10-13 NOTE — TRANSFER OF CARE
"Anesthesia Transfer of Care Note    Patient: Rios Buck    Procedure(s) Performed: Procedure(s) (LRB):  COLONOSCOPY (N/A)    Patient location: PACU    Anesthesia Type: general    Transport from OR: Transported from OR on room air with adequate spontaneous ventilation    Post pain: adequate analgesia    Post assessment: no apparent anesthetic complications and tolerated procedure well    Post vital signs: stable    Level of consciousness: sedated    Nausea/Vomiting: no nausea/vomiting    Complications: none    Transfer of care protocol was followed      Last vitals:   Visit Vitals  /67 (BP Location: Left arm, Patient Position: Lying)   Pulse (!) 51   Temp 36.6 °C (97.9 °F) (Skin)   Resp 16   Ht 5' 6" (1.676 m)   Wt 65.8 kg (145 lb)   SpO2 99%   BMI 23.40 kg/m²     "

## 2023-10-13 NOTE — PLAN OF CARE
Vss, traci po fluids, denies pain, ambulates easily. IV removed, catheter intact. Discharge instructions provided and states understanding. States ready to go home.

## 2023-10-13 NOTE — ANESTHESIA PREPROCEDURE EVALUATION
10/13/2023  Rios Buck is a 48 y.o., male.      Pre-op Assessment    I have reviewed the Patient Summary Reports.     I have reviewed the Nursing Notes. I have reviewed the NPO Status.   I have reviewed the Medications.     Review of Systems  Anesthesia Hx:  No problems with previous Anesthesia    Social:  Non-Smoker    Cardiovascular:  Cardiovascular Normal     Pulmonary:  Pulmonary Normal    Renal/:  Renal/ Normal     Hepatic/GI:   PUD, Liver Disease, IBS   Neurological:  Neurology Normal    Endocrine:  Endocrine Normal    Psych:   Psychiatric History anxiety          Physical Exam  General: Well nourished, Cooperative, Alert and Oriented    Airway:  Mallampati: II   Mouth Opening: Normal  TM Distance: Normal  Neck ROM: Normal ROM        Anesthesia Plan  Type of Anesthesia, risks & benefits discussed:    Anesthesia Type: Gen ETT, Gen Supraglottic Airway, Gen Natural Airway, MAC  Intra-op Monitoring Plan: Standard ASA Monitors  Post Op Pain Control Plan: multimodal analgesia  Induction:  IV  Airway Plan: Direct, Video and Fiberoptic, Post-Induction  Informed Consent: Informed consent signed with the Patient and all parties understand the risks and agree with anesthesia plan.  All questions answered.   ASA Score: 2    Ready For Surgery From Anesthesia Perspective.     .

## 2023-10-13 NOTE — H&P
[facility] Note    CC: Change in bowel habits     HPI 48 y.o. male presents for colonoscopy due to change in bowel habits    Past Medical History:   Diagnosis Date    Colon polyp     Diverticulosis     Epidermoid cyst of testis     left    H. pylori infection     Hydrocele, left     IBS (irritable bowel syndrome)     Peptic ulcer disease     Shoulder pain, right        Allergies and Medications reviewed     Review of Systems  General ROS: negative for - chills, fever or weight loss  Cardiovascular ROS: no chest pain or dyspnea on exertion  Gastrointestinal ROS: + change in bowel habits    Physical Examination  There were no vitals taken for this visit.  General appearance: alert, cooperative, no distress  HENT: Normocephalic, atraumatic, neck symmetrical, no nasal discharge, sclera anicteric   Lungs: clear to auscultation bilaterally, symmetric chest wall expansion bilaterally  Heart: regular rate and rhythm without rub; no displacement of the PMI   Abdomen: soft  Extremities: extremities symmetric; no clubbing, cyanosis, or edema        Labs:  Lab Results   Component Value Date    WBC 6.47 09/13/2023    HGB 13.9 (L) 09/13/2023    HCT 42.5 09/13/2023    MCV 88 09/13/2023     09/13/2023           Assessment:   48 y.o. male presents for colonoscopy    Plan:  -Proceed to colonoscopy     Sheila Adams MD  [facility]  [address], [address]  [place] [address]  Office: [phone]  Fax: [phone]

## 2023-10-15 DIAGNOSIS — Z87.19 HISTORY OF IBS: ICD-10-CM

## 2023-10-15 DIAGNOSIS — R10.9 ABDOMINAL CRAMPING: ICD-10-CM

## 2023-10-15 DIAGNOSIS — R19.7 DIARRHEA, UNSPECIFIED TYPE: ICD-10-CM

## 2023-10-16 RX ORDER — DICYCLOMINE HYDROCHLORIDE 10 MG/1
CAPSULE ORAL
Qty: 120 CAPSULE | Refills: 2 | Status: SHIPPED | OUTPATIENT
Start: 2023-10-16 | End: 2024-01-16

## 2023-10-16 NOTE — ANESTHESIA POSTPROCEDURE EVALUATION
Anesthesia Post Evaluation    Patient: Rios Buck    Procedure(s) Performed: Procedure(s) (LRB):  COLONOSCOPY (N/A)    Final Anesthesia Type: general      Patient location during evaluation: PACU  Patient participation: Yes- Able to Participate  Level of consciousness: awake and alert and oriented  Post-procedure vital signs: reviewed and stable  Pain management: adequate  Airway patency: patent    PONV status at discharge: No PONV  Anesthetic complications: no      Cardiovascular status: blood pressure returned to baseline and stable  Respiratory status: unassisted and spontaneous ventilation  Hydration status: euvolemic  Follow-up not needed.          Vitals Value Taken Time   /66 10/13/23 1342   Temp 36.7 °C (98.1 °F) 10/13/23 1335   Pulse 56 10/13/23 1342   Resp 16 10/13/23 1340   SpO2 100 % 10/13/23 1342   Vitals shown include unvalidated device data.      Event Time   Out of Recovery 13:50:36         Pain/Thony Score: No data recorded

## 2023-11-29 ENCOUNTER — OFFICE VISIT (OUTPATIENT)
Dept: FAMILY MEDICINE | Facility: CLINIC | Age: 48
End: 2023-11-29
Payer: COMMERCIAL

## 2023-11-29 ENCOUNTER — HOSPITAL ENCOUNTER (OUTPATIENT)
Dept: RADIOLOGY | Facility: HOSPITAL | Age: 48
Discharge: HOME | End: 2023-11-29
Attending: PHYSICIAN ASSISTANT
Payer: COMMERCIAL

## 2023-11-29 ENCOUNTER — TELEPHONE (OUTPATIENT)
Dept: FAMILY MEDICINE | Facility: CLINIC | Age: 48
End: 2023-11-29

## 2023-11-29 VITALS
BODY MASS INDEX: 26.13 KG/M2 | DIASTOLIC BLOOD PRESSURE: 60 MMHG | WEIGHT: 162.56 LBS | OXYGEN SATURATION: 96 % | HEART RATE: 95 BPM | SYSTOLIC BLOOD PRESSURE: 106 MMHG | TEMPERATURE: 99 F | HEIGHT: 66 IN

## 2023-11-29 DIAGNOSIS — J40 BRONCHITIS: ICD-10-CM

## 2023-11-29 DIAGNOSIS — R05.2 SUBACUTE COUGH: Primary | ICD-10-CM

## 2023-11-29 DIAGNOSIS — R05.2 SUBACUTE COUGH: ICD-10-CM

## 2023-11-29 DIAGNOSIS — J40 BRONCHITIS: Primary | ICD-10-CM

## 2023-11-29 DIAGNOSIS — R05.9 COUGH, UNSPECIFIED TYPE: Primary | ICD-10-CM

## 2023-11-29 PROBLEM — Z01.818 PRE-OP TESTING: Status: RESOLVED | Noted: 2021-03-05 | Resolved: 2023-11-29

## 2023-11-29 PROBLEM — R14.0 ABDOMINAL BLOATING: Status: RESOLVED | Noted: 2017-05-23 | Resolved: 2023-11-29

## 2023-11-29 PROBLEM — T78.2XXA ANAPHYLACTIC SYNDROME: Status: RESOLVED | Noted: 2021-04-21 | Resolved: 2023-11-29

## 2023-11-29 PROBLEM — R19.7 DIARRHEA: Status: RESOLVED | Noted: 2020-06-04 | Resolved: 2023-11-29

## 2023-11-29 PROCEDURE — 71046 X-RAY EXAM CHEST 2 VIEWS: CPT | Mod: TC

## 2023-11-29 PROCEDURE — 3078F DIAST BP <80 MM HG: CPT | Mod: CPTII,S$GLB,, | Performed by: PHYSICIAN ASSISTANT

## 2023-11-29 PROCEDURE — 3008F PR BODY MASS INDEX (BMI) DOCUMENTED: ICD-10-PCS | Mod: CPTII,S$GLB,, | Performed by: PHYSICIAN ASSISTANT

## 2023-11-29 PROCEDURE — 99213 OFFICE O/P EST LOW 20 MIN: CPT | Mod: S$GLB,,, | Performed by: PHYSICIAN ASSISTANT

## 2023-11-29 PROCEDURE — 1159F PR MEDICATION LIST DOCUMENTED IN MEDICAL RECORD: ICD-10-PCS | Mod: CPTII,S$GLB,, | Performed by: PHYSICIAN ASSISTANT

## 2023-11-29 PROCEDURE — 71046 X-RAY EXAM CHEST 2 VIEWS: CPT | Mod: 26,,, | Performed by: RADIOLOGY

## 2023-11-29 PROCEDURE — 99999 PR PBB SHADOW E&M-EST. PATIENT-LVL IV: ICD-10-PCS | Mod: PBBFAC,,, | Performed by: PHYSICIAN ASSISTANT

## 2023-11-29 PROCEDURE — 1160F RVW MEDS BY RX/DR IN RCRD: CPT | Mod: CPTII,S$GLB,, | Performed by: PHYSICIAN ASSISTANT

## 2023-11-29 PROCEDURE — 99999 PR PBB SHADOW E&M-EST. PATIENT-LVL IV: CPT | Mod: PBBFAC,,, | Performed by: PHYSICIAN ASSISTANT

## 2023-11-29 PROCEDURE — 3074F PR MOST RECENT SYSTOLIC BLOOD PRESSURE < 130 MM HG: ICD-10-PCS | Mod: CPTII,S$GLB,, | Performed by: PHYSICIAN ASSISTANT

## 2023-11-29 PROCEDURE — 3078F PR MOST RECENT DIASTOLIC BLOOD PRESSURE < 80 MM HG: ICD-10-PCS | Mod: CPTII,S$GLB,, | Performed by: PHYSICIAN ASSISTANT

## 2023-11-29 PROCEDURE — 1160F PR REVIEW ALL MEDS BY PRESCRIBER/CLIN PHARMACIST DOCUMENTED: ICD-10-PCS | Mod: CPTII,S$GLB,, | Performed by: PHYSICIAN ASSISTANT

## 2023-11-29 PROCEDURE — 71046 XR CHEST PA AND LATERAL: ICD-10-PCS | Mod: 26,,, | Performed by: RADIOLOGY

## 2023-11-29 PROCEDURE — 99213 PR OFFICE/OUTPT VISIT, EST, LEVL III, 20-29 MIN: ICD-10-PCS | Mod: S$GLB,,, | Performed by: PHYSICIAN ASSISTANT

## 2023-11-29 PROCEDURE — 1159F MED LIST DOCD IN RCRD: CPT | Mod: CPTII,S$GLB,, | Performed by: PHYSICIAN ASSISTANT

## 2023-11-29 PROCEDURE — 3008F BODY MASS INDEX DOCD: CPT | Mod: CPTII,S$GLB,, | Performed by: PHYSICIAN ASSISTANT

## 2023-11-29 PROCEDURE — 3074F SYST BP LT 130 MM HG: CPT | Mod: CPTII,S$GLB,, | Performed by: PHYSICIAN ASSISTANT

## 2023-11-29 RX ORDER — CODEINE PHOSPHATE AND GUAIFENESIN 10; 100 MG/5ML; MG/5ML
5 SOLUTION ORAL EVERY 6 HOURS PRN
Qty: 118 ML | Refills: 0 | Status: SHIPPED | OUTPATIENT
Start: 2023-11-29 | End: 2023-12-09

## 2023-11-29 RX ORDER — AZITHROMYCIN 250 MG/1
TABLET, FILM COATED ORAL
Qty: 6 TABLET | Refills: 0 | Status: SHIPPED | OUTPATIENT
Start: 2023-11-29 | End: 2023-12-04

## 2023-11-29 RX ORDER — PREDNISONE 10 MG/1
TABLET ORAL
Qty: 12 TABLET | Refills: 0 | Status: SHIPPED | OUTPATIENT
Start: 2023-11-29

## 2023-11-29 RX ORDER — PROMETHAZINE HYDROCHLORIDE, PHENYLEPHRINE HYDROCHLORIDE AND CODEINE PHOSPHATE 6.25; 5; 1 MG/5ML; MG/5ML; MG/5ML
5 SOLUTION ORAL
Qty: 180 ML | Refills: 0 | Status: SHIPPED | OUTPATIENT
Start: 2023-11-29 | End: 2023-11-29 | Stop reason: RX

## 2023-11-29 NOTE — PROGRESS NOTES
"Subjective     Patient ID: Rios Buck is a 48 y.o. male.    Chief Complaint: Cough and Headache    Patient presents to clinic with complaints of continued cough.  About 3 weeks ago he was diagnosed with influenza and was given Tamiflu.  He completed that but states the cough continued and he returned to urgent care.  Was given an Omnicef, a steroid shot and some Phenergan cough syrup.  He states he has completed the antibiotic but is still having coughing fits and is not feeling much better.  He has a constant headache and feels very fatigued.  He has also been taking some over-the-counter cough medicine with little relief.  He denies any shortness of breath or wheezing.      Review of Systems   Constitutional:  Negative for activity change, appetite change, fatigue and fever.   HENT:  Positive for nasal congestion, postnasal drip, rhinorrhea, sinus pressure/congestion and sore throat. Negative for ear discharge, ear pain, facial swelling, hearing loss, mouth sores, nosebleeds, tinnitus and trouble swallowing.    Eyes:  Negative for discharge, redness and visual disturbance.   Respiratory:  Positive for cough. Negative for chest tightness, shortness of breath and wheezing.    Cardiovascular:  Negative for chest pain, palpitations and leg swelling.   Gastrointestinal:  Negative for abdominal pain, nausea and vomiting.   Musculoskeletal:  Negative for neck stiffness.          Objective   Vitals:    11/29/23 1301   BP: 106/60   BP Location: Left arm   Patient Position: Sitting   BP Method: Medium (Manual)   Pulse: 95   Temp: 98.8 °F (37.1 °C)   TempSrc: Oral   SpO2: 96%   Weight: 73.8 kg (162 lb 9.4 oz)   Height: 5' 6" (1.676 m)      Physical Exam  Constitutional:       General: He is not in acute distress.     Appearance: He is well-developed. He is not diaphoretic.   HENT:      Head: Normocephalic and atraumatic.      Right Ear: External ear normal.      Left Ear: External ear normal.      Mouth/Throat:     "  Pharynx: Uvula midline. No oropharyngeal exudate, posterior oropharyngeal erythema or uvula swelling.      Tonsils: No tonsillar abscesses.   Eyes:      General:         Right eye: No discharge.         Left eye: No discharge.      Conjunctiva/sclera: Conjunctivae normal.      Pupils: Pupils are equal, round, and reactive to light.   Neck:      Thyroid: No thyromegaly.   Cardiovascular:      Rate and Rhythm: Normal rate and regular rhythm.      Heart sounds: Normal heart sounds. No murmur heard.     No friction rub. No gallop.   Pulmonary:      Effort: Pulmonary effort is normal. No respiratory distress.      Breath sounds: Normal breath sounds. No wheezing or rales.   Abdominal:      General: Bowel sounds are normal.      Palpations: Abdomen is soft.      Tenderness: There is no abdominal tenderness.   Musculoskeletal:      Cervical back: Normal range of motion and neck supple.   Lymphadenopathy:      Cervical: No cervical adenopathy.            Assessment and Plan     1. Subacute cough  -     X-Ray Chest PA And Lateral; Future; Expected date: 11/29/2023  -     azithromycin (Z-ALAINA) 250 MG tablet; Take 2 tablets by mouth on day 1; Take 1 tablet by mouth on days 2-5  Dispense: 6 tablet; Refill: 0    2. Bronchitis  -     predniSONE (DELTASONE) 10 MG tablet; 3 pills a day for 2 days, then 2 pills a day for 2 days then 1 pill a day  Dispense: 12 tablet; Refill: 0    Dr. Barraza sent in promethazine VC for the patient.  Will call with chest x-ray results and further recommendations.         No follow-ups on file.

## 2023-11-29 NOTE — TELEPHONE ENCOUNTER
Spoke with pharmacy Promethazine VC-Codeine is out of stock and on backorder.  They do have Robitussin Ac.  If this is okay please send in the Robitussin AC

## 2023-11-29 NOTE — TELEPHONE ENCOUNTER
----- Message from Alannah Mabry sent at 11/29/2023  3:02 PM CST -----  Regarding: patient call back  Type: Patient Call Back    Who called: Self     What is the request in detail: Patient said that the pharmacy does not have his Rx for predniSONE (DELTASONE) 10 MG tablet. Asked if he could a written copy of the Rx to bring somewhere else     Can the clinic reply by [facility]? No     Would the patient rather a call back or a response via [facility]? Call     Best call back number: [phone]

## 2024-01-14 DIAGNOSIS — R10.9 ABDOMINAL CRAMPING: ICD-10-CM

## 2024-01-14 DIAGNOSIS — R19.7 DIARRHEA, UNSPECIFIED TYPE: ICD-10-CM

## 2024-01-14 DIAGNOSIS — Z87.19 HISTORY OF IBS: ICD-10-CM

## 2024-01-16 RX ORDER — DICYCLOMINE HYDROCHLORIDE 10 MG/1
CAPSULE ORAL
Qty: 360 CAPSULE | Refills: 0 | Status: SHIPPED | OUTPATIENT
Start: 2024-01-16 | End: 2024-04-19

## 2024-04-16 ENCOUNTER — OFFICE VISIT (OUTPATIENT)
Dept: FAMILY MEDICINE | Facility: CLINIC | Age: 49
End: 2024-04-16
Payer: COMMERCIAL

## 2024-04-16 DIAGNOSIS — R05.1 ACUTE COUGH: ICD-10-CM

## 2024-04-16 DIAGNOSIS — J40 BRONCHITIS: Primary | ICD-10-CM

## 2024-04-16 PROCEDURE — 99213 OFFICE O/P EST LOW 20 MIN: CPT | Mod: 95,,,

## 2024-04-16 PROCEDURE — 1160F RVW MEDS BY RX/DR IN RCRD: CPT | Mod: CPTII,95,,

## 2024-04-16 PROCEDURE — 1159F MED LIST DOCD IN RCRD: CPT | Mod: CPTII,95,,

## 2024-04-16 RX ORDER — PREDNISONE 20 MG/1
40 TABLET ORAL DAILY
Qty: 10 TABLET | Refills: 0 | Status: SHIPPED | OUTPATIENT
Start: 2024-04-16 | End: 2024-04-21

## 2024-04-16 RX ORDER — AZITHROMYCIN 250 MG/1
TABLET, FILM COATED ORAL
Qty: 6 TABLET | Refills: 0 | Status: SHIPPED | OUTPATIENT
Start: 2024-04-16

## 2024-04-16 RX ORDER — LEVOCETIRIZINE DIHYDROCHLORIDE 5 MG/1
5 TABLET, FILM COATED ORAL NIGHTLY
Qty: 30 TABLET | Refills: 0 | Status: SHIPPED | OUTPATIENT
Start: 2024-04-16

## 2024-04-16 RX ORDER — ALBUTEROL SULFATE 90 UG/1
2 POWDER, METERED RESPIRATORY (INHALATION) EVERY 6 HOURS PRN
Qty: 6 EACH | Refills: 0 | Status: SHIPPED | OUTPATIENT
Start: 2024-04-16 | End: 2025-04-16

## 2024-04-16 NOTE — PATIENT INSTRUCTIONS

## 2024-04-16 NOTE — PROGRESS NOTES
Subjective:       Patient ID: Rios Buck is a 48 y.o. male.  The patient location is: [place], [place]  The chief complaint leading to consultation is: Cough    Visit type: audiovisual    Face to Face time with patient: 7  20 minutes of total time spent on the encounter, which includes face to face time and non-face to face time preparing to see the patient (eg, review of tests), Obtaining and/or reviewing separately obtained history, Documenting clinical information in the electronic or other health record, Independently interpreting results (not separately reported) and communicating results to the patient/family/caregiver, or Care coordination (not separately reported).         Each patient to whom he or she provides medical services by telemedicine is:  (1) informed of the relationship between the physician and patient and the respective role of any other health care provider with respect to management of the patient; and (2) notified that he or she may decline to receive medical services by telemedicine and may withdraw from such care at any time.      Chief Complaint: Cough    Patient presents to the clinic with complaint of cough.     States he was seen at urgent care 8 days ago and treated with Augmentin and Promethazine DM for Bronchitis. States cough continues. Denies SOB. States cough is nonproductive.       Cough  This is a new problem. The current episode started 1 to 4 weeks ago (9 days ago). The problem has been gradually worsening. The problem occurs every few minutes. The cough is Productive of sputum. Associated symptoms include ear congestion, ear pain, headaches, nasal congestion and postnasal drip. Pertinent negatives include no chest pain, chills, eye redness, fever, rash, sore throat, shortness of breath or wheezing. Nothing aggravates the symptoms. He has tried OTC cough suppressant and prescription cough suppressant for the symptoms. The treatment provided no relief.     Review of  Systems   Constitutional:  Negative for activity change, appetite change, chills, diaphoresis and fever.   HENT:  Positive for congestion, ear pain and postnasal drip. Negative for sinus pressure, sneezing and sore throat.    Eyes:  Negative for pain, discharge, redness and itching.   Respiratory:  Positive for cough. Negative for apnea, chest tightness, shortness of breath and wheezing.    Cardiovascular:  Negative for chest pain and leg swelling.   Gastrointestinal:  Negative for abdominal distention, abdominal pain, constipation, diarrhea, nausea and vomiting.   Genitourinary:  Negative for difficulty urinating, dysuria, flank pain and frequency.   Skin:  Negative for color change, rash and wound.   Neurological:  Positive for headaches. Negative for dizziness.   All other systems reviewed and are negative.      Patient Active Problem List   Diagnosis    Anxiety    Gastritis determined by endoscopy    Impingement syndrome of right shoulder    Weakness of shoulder    Derangement of posterior horn of medial meniscus    SLAP (superior glenoid labrum lesion)    Elevated LFTs       Objective:      Physical Exam  Constitutional:       General: He is not in acute distress.     Appearance: Normal appearance. He is not ill-appearing.   HENT:      Head: Normocephalic.   Eyes:      Conjunctiva/sclera: Conjunctivae normal.      Pupils: Pupils are equal, round, and reactive to light.      Comments: As seen on virtual visit   Pulmonary:      Effort: Pulmonary effort is normal. No respiratory distress.   Musculoskeletal:      Cervical back: Normal range of motion and neck supple.   Skin:     General: Skin is warm and dry.   Neurological:      General: No focal deficit present.      Mental Status: He is alert and oriented to person, place, and time.   Psychiatric:         Mood and Affect: Mood normal.         Behavior: Behavior normal.         Lab Results   Component Value Date    WBC 6.47 09/13/2023    HGB 13.9 (L) 09/13/2023     HCT 42.5 09/13/2023     09/13/2023    CHOL 280 (H) 07/27/2017    TRIG 386 (H) 07/27/2017    HDL 56 07/27/2017    ALT 37 09/13/2023    AST 22 09/13/2023     09/13/2023    K 3.7 09/13/2023     09/13/2023    CREATININE 1.0 09/13/2023    BUN 14 09/13/2023    CO2 24 09/13/2023    TSH 1.187 09/13/2023    HGBA1C 5.4 04/16/2015     The ASCVD Risk score (Stacey DK, et al., 2019) failed to calculate for the following reasons:    Cannot find a previous HDL lab    Cannot find a previous total cholesterol lab    Assessment:       1. Bronchitis    2. Acute cough        Plan:       1. Bronchitis/Acute cough  -     predniSONE (DELTASONE) 20 MG tablet; Take 2 tablets (40 mg total) by mouth once daily. for 5 days  Dispense: 10 tablet; Refill: 0  -     azithromycin (Z-ALAINA) 250 MG tablet; Take 2 tablets by mouth on day 1; Take 1 tablet by mouth on days 2-5  Dispense: 6 tablet; Refill: 0  -     levocetirizine (XYZAL) 5 MG tablet; Take 1 tablet (5 mg total) by mouth every evening.  Dispense: 30 tablet; Refill: 0  -     albuterol sulfate (PROAIR RESPICLICK) 90 mcg/actuation inhaler; Inhale 2 puffs into the lungs every 6 (six) hours as needed for Wheezing. Rescue  Dispense: 6 each; Refill: 0   - The diagnosis, treatment plan, instructions for follow-up and reevaluation as well as ED precautions were discussed and understanding was verbalized. All questions or concerns have been addressed.        Follow up if symptoms worsen or fail to improve.

## 2024-04-17 RX ORDER — ALBUTEROL SULFATE 90 UG/1
2 AEROSOL, METERED RESPIRATORY (INHALATION) EVERY 6 HOURS PRN
Qty: 18 G | Refills: 0 | Status: SHIPPED | OUTPATIENT
Start: 2024-04-17 | End: 2025-04-17

## 2024-04-17 RX ORDER — ALBUTEROL SULFATE 90 UG/1
2 POWDER, METERED RESPIRATORY (INHALATION) EVERY 6 HOURS PRN
Refills: 0 | OUTPATIENT
Start: 2024-04-17 | End: 2025-04-17

## 2024-05-15 ENCOUNTER — OFFICE VISIT (OUTPATIENT)
Dept: ORTHOPEDICS | Facility: CLINIC | Age: 49
End: 2024-05-15
Payer: COMMERCIAL

## 2024-05-15 VITALS — HEIGHT: 66 IN | OXYGEN SATURATION: 98 % | HEART RATE: 86 BPM | BODY MASS INDEX: 26.14 KG/M2 | WEIGHT: 162.69 LBS

## 2024-05-15 DIAGNOSIS — M77.11 RIGHT LATERAL EPICONDYLITIS: Primary | ICD-10-CM

## 2024-05-15 PROCEDURE — 99999 PR PBB SHADOW E&M-EST. PATIENT-LVL III: CPT | Mod: PBBFAC,,, | Performed by: ORTHOPAEDIC SURGERY

## 2024-05-15 PROCEDURE — 1160F RVW MEDS BY RX/DR IN RCRD: CPT | Mod: CPTII,S$GLB,, | Performed by: ORTHOPAEDIC SURGERY

## 2024-05-15 PROCEDURE — 3008F BODY MASS INDEX DOCD: CPT | Mod: CPTII,S$GLB,, | Performed by: ORTHOPAEDIC SURGERY

## 2024-05-15 PROCEDURE — 99214 OFFICE O/P EST MOD 30 MIN: CPT | Mod: S$GLB,,, | Performed by: ORTHOPAEDIC SURGERY

## 2024-05-15 PROCEDURE — 1159F MED LIST DOCD IN RCRD: CPT | Mod: CPTII,S$GLB,, | Performed by: ORTHOPAEDIC SURGERY

## 2024-05-15 RX ORDER — CELECOXIB 100 MG/1
100 CAPSULE ORAL 2 TIMES DAILY
Qty: 60 CAPSULE | Refills: 0 | Status: SHIPPED | OUTPATIENT
Start: 2024-05-15

## 2024-05-15 NOTE — PROGRESS NOTES
Subjective:      Patient ID: Rios Buck is a 48 y.o. male.    Chief Complaint: Pain of the Right Elbow    HPI    Patient follow up on his right elbow /lateral epicondylitis.  Did very well after previous injection.  He has had recurrence of his pain  ROS      Objective:    Ortho Exam       Constitutional:   Patient is alert  and oriented in no acute distress  HEENT:  normocephalic atraumatic; PERRL EOMI  Neck:  Supple without adenopathy  Cardiovascular:  Normal rate and rhythm  Pulmonary:  Normal respiratory effort normal chest wall expansion  Abdominal:  Nonprotuberant nondistended  Musculoskeletal:  Patient has adequate cervical shoulder and elbow range of motion.  There is a bit of difficulty  With full elbow extension.  Moderate tenderness over the lateral epicondyle.  There is also some mild discomfort with palpation down into the proximal portion of the forearm.  Positive pain with EDC extension against resistance.  Intact flexor and extensor tendon function.  Intact skin, sensation, and brisk capillary refill of all digits.  Negative Tinel's at the elbow.  There is no increased warmth or erythema.  No elbow instability  Neurological:  No focal defect; cranial nerves 2-12 grossly intact  Psychiatric/behavioral:  Mood and behavior normal           My Radiographs Findings:     No new radiographs  Assessment:       Encounter Diagnosis   Name Primary?    Right lateral epicondylitis Yes         Plan:        I have discussed medical condition treatment options him at length.  After a verbal consent and sterile prep I injected his elbow today without complication.  I will give him a prescription for Celebrex.  We have discussed GI cardiac and renal precautions.  Have him continue with his rehab exercises elbow strap as needed follow up can be as needed.        Past Medical History:   Diagnosis Date    Colon polyp     Diverticulosis     Epidermoid cyst of testis     left    H. pylori infection     Hydrocele,  left     IBS (irritable bowel syndrome)     Peptic ulcer disease     Shoulder pain, right      Past Surgical History:   Procedure Laterality Date    ADENOIDECTOMY      ARTHROSCOPIC ACROMIOPLASTY OF SHOULDER Right 09/10/2020    Procedure: ACROMIOPLASTY, ARTHROSCOPIC;  Surgeon: Gagan Astorga MD;  Location: [facility] OR;  Service: Orthopedics;  Laterality: Right;    ARTHROSCOPIC DEBRIDEMENT OF SHOULDER Right 03/26/2021    Procedure: DEBRIDEMENT, SHOULDER, ARTHROSCOPIC;  Surgeon: Los Lopez MD;  Location: [facility];  Service: Orthopedics;  Laterality: Right;    COLONOSCOPY  06/2015    repeat in 5 years for surveillance    COLONOSCOPY N/A 06/04/2020    Procedure: COLONOSCOPY;  Surgeon: Sheila Adams MD;  Location: [facility] ENDO;  Service: Endoscopy;  Laterality: N/A;    COLONOSCOPY N/A 10/13/2023    Procedure: COLONOSCOPY;  Surgeon: Sheila Adams MD;  Location: [facility] ENDO;  Service: Endoscopy;  Laterality: N/A;    DISTAL CLAVICLE EXCISION Right 09/10/2020    Procedure: EXCISION, CLAVICLE, DISTAL;  Surgeon: Gagan Astorga MD;  Location: [facility] OR;  Service: Orthopedics;  Laterality: Right;    ESOPHAGOGASTRODUODENOSCOPY N/A 06/04/2020    Procedure: EGD (ESOPHAGOGASTRODUODENOSCOPY);  Surgeon: Sheila Adams MD;  Location: [facility];  Service: Endoscopy;  Laterality: N/A;    FIXATION OF TENDON Right 03/26/2021    Procedure: FIXATION, TENDON - RIGHT SHOULDER;  Surgeon: Los Lopez MD;  Location: [facility];  Service: Orthopedics;  Laterality: Right;    KNEE ARTHROSCOPY W/ DEBRIDEMENT  2012    right knee     KNEE ARTHROSCOPY W/ MENISCECTOMY Left 03/05/2021    Procedure: ARTHROSCOPY, KNEE, WITH MENISCECTOMY;  Surgeon: Los Lopez MD;  Location: [facility];  Service: Orthopedics;  Laterality: Left;    TONSILLECTOMY      UPPER GASTROINTESTINAL ENDOSCOPY  05/2015    repeat in 8 weeks to reassess ulcer for healing         Current Outpatient Medications:     azithromycin (Z-ALAINA) 250 MG tablet, Take 2  tablets by mouth on day 1; Take 1 tablet by mouth on days 2-5, Disp: 6 tablet, Rfl: 0    RETIN-A 0.05 % cream, Apply pea sized amt to face every other night x 2 wks then nightly, Disp: 45 g, Rfl: 11    acetaminophen (TYLENOL) 500 MG tablet, Take 1,000 mg by mouth every 6 (six) hours as needed for Pain. (Patient not taking: Reported on 4/19/2024), Disp: , Rfl:     albuterol (VENTOLIN HFA) 90 mcg/actuation inhaler, Inhale 2 puffs into the lungs every 6 (six) hours as needed for Wheezing. Rescue (Patient not taking: Reported on 4/19/2024), Disp: 18 g, Rfl: 0    albuterol sulfate (PROAIR RESPICLICK) 90 mcg/actuation inhaler, Inhale 2 puffs into the lungs every 6 (six) hours as needed for Wheezing. Rescue (Patient not taking: Reported on 4/19/2024), Disp: 6 each, Rfl: 0    levocetirizine (XYZAL) 5 MG tablet, Take 1 tablet (5 mg total) by mouth every evening. (Patient not taking: Reported on 4/19/2024), Disp: 30 tablet, Rfl: 0  No current facility-administered medications for this visit.    Facility-Administered Medications Ordered in Other Visits:     mupirocin 2 % ointment, , Nasal, On Call Procedure, Los Lopez MD, Given at 03/26/21 0644    Review of patient's allergies indicates:   Allergen Reactions    Bactrim [sulfamethoxazole-trimethoprim] Hives and Swelling    Doxycycline Other (See Comments)     constipation    Other Reaction(s): Not available       Family History   Problem Relation Name Age of Onset    Diabetes Mother      Heart disease Mother  50        CABG x3    Melanoma Father      Cancer Maternal Grandfather  64        colon cancer    Colon cancer Maternal Grandfather  65    Crohn's disease Neg Hx      Ulcerative colitis Neg Hx      Celiac disease Neg Hx       Social History     Occupational History    Not on file   Tobacco Use    Smoking status: Never    Smokeless tobacco: Never   Substance and Sexual Activity    Alcohol use: No    Drug use: No    Sexual activity: Not on file

## 2024-06-21 ENCOUNTER — OFFICE VISIT (OUTPATIENT)
Dept: ORTHOPEDICS | Facility: CLINIC | Age: 49
End: 2024-06-21
Payer: COMMERCIAL

## 2024-06-21 VITALS
OXYGEN SATURATION: 100 % | BODY MASS INDEX: 26.14 KG/M2 | WEIGHT: 162.69 LBS | HEART RATE: 76 BPM | RESPIRATION RATE: 19 BRPM | HEIGHT: 66 IN

## 2024-06-21 DIAGNOSIS — M77.11 RIGHT LATERAL EPICONDYLITIS: Primary | ICD-10-CM

## 2024-06-21 PROCEDURE — 99999 PR PBB SHADOW E&M-EST. PATIENT-LVL IV: CPT | Mod: PBBFAC,,, | Performed by: ORTHOPAEDIC SURGERY

## 2024-06-21 RX ORDER — CELECOXIB 100 MG/1
100 CAPSULE ORAL 2 TIMES DAILY
Qty: 60 CAPSULE | Refills: 1 | Status: SHIPPED | OUTPATIENT
Start: 2024-06-21

## 2024-06-21 NOTE — PROGRESS NOTES
Subjective:      Patient ID: Rios Buck is a 48 y.o. male.    Chief Complaint: Pain, Follow-up, and Swelling of the Right Elbow    HPI  The patient is in for follow up on his right elbow.  He is still having some discomfort with the all it is slightly improved  ROS      Objective:    Ortho Exam       Constitutional:   Patient is alert  and oriented in no acute distress  HEENT:  normocephalic atraumatic; PERRL EOMI  Neck:  Supple without adenopathy  Cardiovascular:  Normal rate and rhythm  Pulmonary:  Normal respiratory effort normal chest wall expansion  Abdominal:  Nonprotuberant nondistended  Musculoskeletal:  Patient has adequate cervical shoulder and elbow range of motion.  There is a bit of difficulty  With full elbow extension.  Moderate tenderness over the lateral epicondyle.  There is also some mild discomfort with palpation down into the proximal portion of the forearm.  Positive pain with EDC extension against resistance.  Intact flexor and extensor tendon function.  Intact skin, sensation, and brisk capillary refill of all digits.  Negative Tinel's at the elbow.  There is no increased warmth or erythema.  No elbow instability  Neurological:  No focal defect; cranial nerves 2-12 grossly intact  Psychiatric/behavioral:  Mood and behavior normal           My Radiographs Findings:    No new radiographs  Assessment:       Encounter Diagnosis   Name Primary?    Right lateral epicondylitis Yes         Plan:       I have discussed medical condition treatment options with him at length.  We will have him up his Celebrex I have emphasizes rehab exercises we will we will try some Voltaren gel and some formal therapy along with a tennis elbow strap.  Follow up 4-6 weeks if symptoms fail to improve we would consider repeat injection or an MRI of his elbow I will see him sooner if any questions or problems.        Past Medical History:   Diagnosis Date    Colon polyp     Diverticulosis     Epidermoid cyst of  testis     left    H. pylori infection     Hydrocele, left     IBS (irritable bowel syndrome)     Peptic ulcer disease     Shoulder pain, right      Past Surgical History:   Procedure Laterality Date    ADENOIDECTOMY      ARTHROSCOPIC ACROMIOPLASTY OF SHOULDER Right 09/10/2020    Procedure: ACROMIOPLASTY, ARTHROSCOPIC;  Surgeon: Gagan Astorga MD;  Location: [facility];  Service: Orthopedics;  Laterality: Right;    ARTHROSCOPIC DEBRIDEMENT OF SHOULDER Right 03/26/2021    Procedure: DEBRIDEMENT, SHOULDER, ARTHROSCOPIC;  Surgeon: Los Lopez MD;  Location: [facility];  Service: Orthopedics;  Laterality: Right;    COLONOSCOPY  06/2015    repeat in 5 years for surveillance    COLONOSCOPY N/A 06/04/2020    Procedure: COLONOSCOPY;  Surgeon: Sheila Adams MD;  Location: [facility];  Service: Endoscopy;  Laterality: N/A;    COLONOSCOPY N/A 10/13/2023    Procedure: COLONOSCOPY;  Surgeon: Sheila Adams MD;  Location: [facility] ENDO;  Service: Endoscopy;  Laterality: N/A;    DISTAL CLAVICLE EXCISION Right 09/10/2020    Procedure: EXCISION, CLAVICLE, DISTAL;  Surgeon: Gagan Astorga MD;  Location: [facility];  Service: Orthopedics;  Laterality: Right;    ESOPHAGOGASTRODUODENOSCOPY N/A 06/04/2020    Procedure: EGD (ESOPHAGOGASTRODUODENOSCOPY);  Surgeon: Sheila Adams MD;  Location: [facility];  Service: Endoscopy;  Laterality: N/A;    FIXATION OF TENDON Right 03/26/2021    Procedure: FIXATION, TENDON - RIGHT SHOULDER;  Surgeon: Los Lopez MD;  Location: [facility];  Service: Orthopedics;  Laterality: Right;    KNEE ARTHROSCOPY W/ DEBRIDEMENT  2012    right knee     KNEE ARTHROSCOPY W/ MENISCECTOMY Left 03/05/2021    Procedure: ARTHROSCOPY, KNEE, WITH MENISCECTOMY;  Surgeon: Los Lopez MD;  Location: [facility];  Service: Orthopedics;  Laterality: Left;    TONSILLECTOMY      UPPER GASTROINTESTINAL ENDOSCOPY  05/2015    repeat in 8 weeks to reassess ulcer for healing         Current Outpatient  Medications:     celecoxib (CELEBREX) 100 MG capsule, Take 1 capsule (100 mg total) by mouth 2 (two) times daily., Disp: 60 capsule, Rfl: 0    acetaminophen (TYLENOL) 500 MG tablet, Take 1,000 mg by mouth every 6 (six) hours as needed for Pain. (Patient not taking: Reported on 4/19/2024), Disp: , Rfl:     albuterol (VENTOLIN HFA) 90 mcg/actuation inhaler, Inhale 2 puffs into the lungs every 6 (six) hours as needed for Wheezing. Rescue (Patient not taking: Reported on 4/19/2024), Disp: 18 g, Rfl: 0    albuterol sulfate (PROAIR RESPICLICK) 90 mcg/actuation inhaler, Inhale 2 puffs into the lungs every 6 (six) hours as needed for Wheezing. Rescue (Patient not taking: Reported on 4/19/2024), Disp: 6 each, Rfl: 0    azithromycin (Z-ALAINA) 250 MG tablet, Take 2 tablets by mouth on day 1; Take 1 tablet by mouth on days 2-5 (Patient not taking: Reported on 6/21/2024), Disp: 6 tablet, Rfl: 0    celecoxib (CELEBREX) 100 MG capsule, Take 1 capsule (100 mg total) by mouth 2 (two) times daily., Disp: 60 capsule, Rfl: 1    levocetirizine (XYZAL) 5 MG tablet, Take 1 tablet (5 mg total) by mouth every evening. (Patient not taking: Reported on 4/19/2024), Disp: 30 tablet, Rfl: 0    RETIN-A 0.05 % cream, Apply pea sized amt to face every other night x 2 wks then nightly (Patient not taking: Reported on 6/21/2024), Disp: 45 g, Rfl: 11  No current facility-administered medications for this visit.    Facility-Administered Medications Ordered in Other Visits:     mupirocin 2 % ointment, , Nasal, On Call Procedure, Los Lopez MD, Given at 03/26/21 0696    Review of patient's allergies indicates:   Allergen Reactions    Bactrim [sulfamethoxazole-trimethoprim] Hives and Swelling    Doxycycline Other (See Comments)     constipation    Other Reaction(s): Not available       Family History   Problem Relation Name Age of Onset    Diabetes Mother      Heart disease Mother  50        CABG x3    Melanoma Father      Cancer Maternal  Grandfather  64        colon cancer    Colon cancer Maternal Grandfather  65    Crohn's disease Neg Hx      Ulcerative colitis Neg Hx      Celiac disease Neg Hx       Social History     Occupational History    Not on file   Tobacco Use    Smoking status: Never     Passive exposure: Never    Smokeless tobacco: Never   Substance and Sexual Activity    Alcohol use: No    Drug use: No    Sexual activity: Not on file

## 2025-03-06 ENCOUNTER — OFFICE VISIT (OUTPATIENT)
Dept: FAMILY MEDICINE | Facility: CLINIC | Age: 50
End: 2025-03-06
Payer: COMMERCIAL

## 2025-03-06 VITALS
OXYGEN SATURATION: 98 % | SYSTOLIC BLOOD PRESSURE: 126 MMHG | TEMPERATURE: 98 F | DIASTOLIC BLOOD PRESSURE: 74 MMHG | BODY MASS INDEX: 26.5 KG/M2 | HEIGHT: 66 IN | WEIGHT: 164.88 LBS | HEART RATE: 78 BPM

## 2025-03-06 DIAGNOSIS — R53.83 FATIGUE, UNSPECIFIED TYPE: Primary | ICD-10-CM

## 2025-03-06 DIAGNOSIS — M25.521 RIGHT ELBOW PAIN: ICD-10-CM

## 2025-03-06 DIAGNOSIS — Z13.1 SCREENING FOR DIABETES MELLITUS: ICD-10-CM

## 2025-03-06 DIAGNOSIS — Z13.220 ENCOUNTER FOR LIPID SCREENING FOR CARDIOVASCULAR DISEASE: ICD-10-CM

## 2025-03-06 DIAGNOSIS — Z13.6 ENCOUNTER FOR LIPID SCREENING FOR CARDIOVASCULAR DISEASE: ICD-10-CM

## 2025-03-06 DIAGNOSIS — Z12.5 PROSTATE CANCER SCREENING: ICD-10-CM

## 2025-03-06 PROCEDURE — 99214 OFFICE O/P EST MOD 30 MIN: CPT | Mod: S$GLB,,,

## 2025-03-06 PROCEDURE — 99999 PR PBB SHADOW E&M-EST. PATIENT-LVL IV: CPT | Mod: PBBFAC,,,

## 2025-03-06 RX ORDER — METHYLPREDNISOLONE 4 MG/1
TABLET ORAL
Qty: 21 EACH | Refills: 0 | Status: SHIPPED | OUTPATIENT
Start: 2025-03-06 | End: 2025-03-27

## 2025-03-06 NOTE — PROGRESS NOTES
To Subjective:       Patient ID: Rios Buck is a 49 y.o. male.    Chief Complaint: Fatigue, Elbow pain    Patient presents to the clinic to discuss checking testosterone and has complaint of elbow pain.     Patient Active Problem List:     Anxiety     Gastritis determined by endoscopy     Impingement syndrome of right shoulder     Weakness of shoulder     Derangement of posterior horn of medial meniscus     SLAP (superior glenoid labrum lesion)     Elevated LFTs    Reports a decrease endurance at the gym, fatigue, and decreased sex drive. Would like to check Testosterone.     Reports right elbow pain. Saw Orthopedic a year ago. Completed exercises- was not able to complete PT due to cost. Taking Celebrex as needed. Has also tried tennis elbow strap.     Patient educated on plan of care, verbalized understanding.          Review of Systems   Constitutional:  Negative for activity change and unexpected weight change.   HENT:  Negative for hearing loss, rhinorrhea and trouble swallowing.    Eyes:  Negative for discharge and visual disturbance.   Respiratory:  Negative for chest tightness and wheezing.    Cardiovascular:  Negative for chest pain and palpitations.   Gastrointestinal:  Negative for blood in stool, constipation, diarrhea and vomiting.   Endocrine: Negative for polydipsia and polyuria.   Genitourinary:  Negative for difficulty urinating, hematuria and urgency.   Musculoskeletal:  Positive for arthralgias. Negative for joint swelling and neck pain.        Right elbow pain   Neurological:  Negative for weakness and headaches.   Psychiatric/Behavioral:  Negative for confusion and dysphoric mood.        Problem List[1]    Objective:      Physical Exam  Vitals and nursing note reviewed.   Constitutional:       Appearance: Normal appearance. He is not ill-appearing.   HENT:      Head: Normocephalic and atraumatic.      Nose: Nose normal.   Eyes:      General: Lids are normal.   Cardiovascular:      Rate  "and Rhythm: Normal rate and regular rhythm.      Pulses: Normal pulses.      Heart sounds: Normal heart sounds.   Pulmonary:      Effort: Pulmonary effort is normal. No tachypnea or respiratory distress.      Breath sounds: Normal breath sounds. No wheezing.   Abdominal:      General: Bowel sounds are normal. There is no distension.      Palpations: Abdomen is soft.      Tenderness: There is no abdominal tenderness.   Musculoskeletal:         General: Normal range of motion.      Right elbow: No effusion. Normal range of motion. Tenderness present.      Cervical back: Full passive range of motion without pain and normal range of motion.      Left lower leg: No edema.   Skin:     General: Skin is warm and dry.   Neurological:      Mental Status: He is alert and oriented to person, place, and time.   Psychiatric:         Mood and Affect: Mood normal.         Behavior: Behavior normal.         Lab Results   Component Value Date    WBC 6.47 09/13/2023    HGB 13.9 (L) 09/13/2023    HCT 42.5 09/13/2023     09/13/2023    CHOL 280 (H) 07/27/2017    TRIG 386 (H) 07/27/2017    HDL 56 07/27/2017    ALT 37 09/13/2023    AST 22 09/13/2023     09/13/2023    K 3.7 09/13/2023     09/13/2023    CREATININE 1.0 09/13/2023    BUN 14 09/13/2023    CO2 24 09/13/2023    TSH 1.187 09/13/2023    HGBA1C 5.4 04/16/2015     The ASCVD Risk score (Stacey HUSSEIN, et al., 2019) failed to calculate for the following reasons:    Cannot find a previous HDL lab    Cannot find a previous total cholesterol lab  Visit Vitals  /74   Pulse 78   Temp 98.1 °F (36.7 °C) (Oral)   Ht 5' 6" (1.676 m)   Wt 74.8 kg (164 lb 14.5 oz)   SpO2 98%   BMI 26.62 kg/m²      Assessment:       1. Fatigue, unspecified type    2. Right elbow pain    3. Encounter for lipid screening for cardiovascular disease    4. Prostate cancer screening    5. Screening for diabetes mellitus        Plan:       1. Fatigue, unspecified type  -     TSH; Future; Expected " date: 03/06/2025  -     CBC Auto Differential; Future; Expected date: 03/06/2025  -     Comprehensive Metabolic Panel; Future; Expected date: 03/06/2025  -     Testosterone; Future; Expected date: 03/06/2025    2. Right elbow pain  -     methylPREDNISolone (MEDROL DOSEPACK) 4 mg tablet; use as directed  Dispense: 21 each; Refill: 0  -     Ambulatory referral/consult to Orthopedics; Future; Expected date: 03/13/2025   - Continue Celebrex    3. Encounter for lipid screening for cardiovascular disease  -     Lipid Panel; Future; Expected date: 03/06/2025    4. Prostate cancer screening  -     PSA, Screening; Future; Expected date: 03/06/2025    5. Screening for diabetes mellitus  -     Hemoglobin A1C; Future; Expected date: 03/06/2025       Follow up if symptoms worsen or fail to improve.      Future Appointments       Date Provider Specialty Appt Notes    3/11/2025 Mo Marcano MD Orthopedics right elbow pain                  [1]   Patient Active Problem List  Diagnosis    Anxiety    Gastritis determined by endoscopy    Impingement syndrome of right shoulder    Weakness of shoulder    Derangement of posterior horn of medial meniscus    SLAP (superior glenoid labrum lesion)    Elevated LFTs

## 2025-03-06 NOTE — PATIENT INSTRUCTIONS

## 2025-03-07 ENCOUNTER — LAB VISIT (OUTPATIENT)
Dept: LAB | Facility: HOSPITAL | Age: 50
End: 2025-03-07
Payer: COMMERCIAL

## 2025-03-07 DIAGNOSIS — Z13.220 ENCOUNTER FOR LIPID SCREENING FOR CARDIOVASCULAR DISEASE: ICD-10-CM

## 2025-03-07 DIAGNOSIS — Z13.6 ENCOUNTER FOR LIPID SCREENING FOR CARDIOVASCULAR DISEASE: ICD-10-CM

## 2025-03-07 DIAGNOSIS — R53.83 FATIGUE, UNSPECIFIED TYPE: ICD-10-CM

## 2025-03-07 DIAGNOSIS — Z13.1 SCREENING FOR DIABETES MELLITUS: ICD-10-CM

## 2025-03-07 DIAGNOSIS — Z12.5 PROSTATE CANCER SCREENING: ICD-10-CM

## 2025-03-07 LAB
ALBUMIN SERPL BCP-MCNC: 3.9 G/DL (ref 3.5–5.2)
ALP SERPL-CCNC: 46 U/L (ref 40–150)
ALT SERPL W/O P-5'-P-CCNC: 19 U/L (ref 10–44)
ANION GAP SERPL CALC-SCNC: 9 MMOL/L (ref 8–16)
AST SERPL-CCNC: 24 U/L (ref 10–40)
BASOPHILS # BLD AUTO: 0.05 K/UL (ref 0–0.2)
BASOPHILS NFR BLD: 0.8 % (ref 0–1.9)
BILIRUB SERPL-MCNC: 0.6 MG/DL (ref 0.1–1)
BUN SERPL-MCNC: 18 MG/DL (ref 6–20)
CALCIUM SERPL-MCNC: 8.8 MG/DL (ref 8.7–10.5)
CHLORIDE SERPL-SCNC: 105 MMOL/L (ref 95–110)
CHOLEST SERPL-MCNC: 226 MG/DL (ref 120–199)
CHOLEST/HDLC SERPL: 4 {RATIO} (ref 2–5)
CO2 SERPL-SCNC: 26 MMOL/L (ref 23–29)
COMPLEXED PSA SERPL-MCNC: 0.99 NG/ML (ref 0–4)
CREAT SERPL-MCNC: 0.9 MG/DL (ref 0.5–1.4)
DIFFERENTIAL METHOD BLD: NORMAL
EOSINOPHIL # BLD AUTO: 0.5 K/UL (ref 0–0.5)
EOSINOPHIL NFR BLD: 6.8 % (ref 0–8)
ERYTHROCYTE [DISTWIDTH] IN BLOOD BY AUTOMATED COUNT: 12.4 % (ref 11.5–14.5)
EST. GFR  (NO RACE VARIABLE): >60 ML/MIN/1.73 M^2
ESTIMATED AVG GLUCOSE: 105 MG/DL (ref 68–131)
GLUCOSE SERPL-MCNC: 85 MG/DL (ref 70–110)
HBA1C MFR BLD: 5.3 % (ref 4–5.6)
HCT VFR BLD AUTO: 43.4 % (ref 40–54)
HDLC SERPL-MCNC: 56 MG/DL (ref 40–75)
HDLC SERPL: 24.8 % (ref 20–50)
HGB BLD-MCNC: 14.3 G/DL (ref 14–18)
IMM GRANULOCYTES # BLD AUTO: 0.02 K/UL (ref 0–0.04)
IMM GRANULOCYTES NFR BLD AUTO: 0.3 % (ref 0–0.5)
LDLC SERPL CALC-MCNC: 152.4 MG/DL (ref 63–159)
LYMPHOCYTES # BLD AUTO: 2.2 K/UL (ref 1–4.8)
LYMPHOCYTES NFR BLD: 33 % (ref 18–48)
MCH RBC QN AUTO: 30.4 PG (ref 27–31)
MCHC RBC AUTO-ENTMCNC: 32.9 G/DL (ref 32–36)
MCV RBC AUTO: 92 FL (ref 82–98)
MONOCYTES # BLD AUTO: 0.3 K/UL (ref 0.3–1)
MONOCYTES NFR BLD: 5.1 % (ref 4–15)
NEUTROPHILS # BLD AUTO: 3.6 K/UL (ref 1.8–7.7)
NEUTROPHILS NFR BLD: 54 % (ref 38–73)
NONHDLC SERPL-MCNC: 170 MG/DL
NRBC BLD-RTO: 0 /100 WBC
PLATELET # BLD AUTO: 181 K/UL (ref 150–450)
PMV BLD AUTO: 10.2 FL (ref 9.2–12.9)
POTASSIUM SERPL-SCNC: 4 MMOL/L (ref 3.5–5.1)
PROT SERPL-MCNC: 6.7 G/DL (ref 6–8.4)
RBC # BLD AUTO: 4.7 M/UL (ref 4.6–6.2)
SODIUM SERPL-SCNC: 140 MMOL/L (ref 136–145)
TESTOST SERPL-MCNC: 451 NG/DL (ref 304–1227)
TRIGL SERPL-MCNC: 88 MG/DL (ref 30–150)
TSH SERPL DL<=0.005 MIU/L-ACNC: 1.55 UIU/ML (ref 0.4–4)
WBC # BLD AUTO: 6.61 K/UL (ref 3.9–12.7)

## 2025-03-07 PROCEDURE — 84153 ASSAY OF PSA TOTAL: CPT

## 2025-03-07 PROCEDURE — 84443 ASSAY THYROID STIM HORMONE: CPT

## 2025-03-07 PROCEDURE — 80061 LIPID PANEL: CPT

## 2025-03-07 PROCEDURE — 85025 COMPLETE CBC W/AUTO DIFF WBC: CPT

## 2025-03-07 PROCEDURE — 84403 ASSAY OF TOTAL TESTOSTERONE: CPT

## 2025-03-07 PROCEDURE — 83036 HEMOGLOBIN GLYCOSYLATED A1C: CPT

## 2025-03-07 PROCEDURE — 80053 COMPREHEN METABOLIC PANEL: CPT

## 2025-03-09 ENCOUNTER — RESULTS FOLLOW-UP (OUTPATIENT)
Dept: FAMILY MEDICINE | Facility: CLINIC | Age: 50
End: 2025-03-09

## 2025-03-10 DIAGNOSIS — M25.529 ELBOW PAIN, UNSPECIFIED LATERALITY: Primary | ICD-10-CM

## 2025-05-14 ENCOUNTER — OFFICE VISIT (OUTPATIENT)
Dept: FAMILY MEDICINE | Facility: CLINIC | Age: 50
End: 2025-05-14
Payer: COMMERCIAL

## 2025-05-14 DIAGNOSIS — R19.7 DIARRHEA, UNSPECIFIED TYPE: ICD-10-CM

## 2025-05-14 DIAGNOSIS — H66.91 RIGHT OTITIS MEDIA, UNSPECIFIED OTITIS MEDIA TYPE: Primary | ICD-10-CM

## 2025-05-14 DIAGNOSIS — R50.9 FEVER, UNSPECIFIED FEVER CAUSE: ICD-10-CM

## 2025-05-14 PROCEDURE — 98006 SYNCH AUDIO-VIDEO EST MOD 30: CPT | Mod: 95,,,

## 2025-05-14 RX ORDER — CEFDINIR 300 MG/1
300 CAPSULE ORAL 2 TIMES DAILY
Qty: 20 CAPSULE | Refills: 0 | Status: SHIPPED | OUTPATIENT
Start: 2025-05-14 | End: 2025-05-24

## 2025-05-14 NOTE — PATIENT INSTRUCTIONS

## 2025-05-14 NOTE — PROGRESS NOTES
Subjective:       Patient ID: Rios Buck is a 49 y.o. male.  The patient location is: [place], [place]  The chief complaint leading to consultation is: Ear pain, fever, diarrhea    Visit type: audiovisual    Face to Face time with patient: 8 minutes  15 minutes of total time spent on the encounter, which includes face to face time and non-face to face time preparing to see the patient (eg, review of tests), Obtaining and/or reviewing separately obtained history, Documenting clinical information in the electronic or other health record, Independently interpreting results (not separately reported) and communicating results to the patient/family/caregiver, or Care coordination (not separately reported).         Each patient to whom he or she provides medical services by telemedicine is:  (1) informed of the relationship between the physician and patient and the respective role of any other health care provider with respect to management of the patient; and (2) notified that he or she may decline to receive medical services by telemedicine and may withdraw from such care at any time.      Chief Complaint: Ear pain, fever, diarrhea    Patient presents virtually to the clinic with complaint of ear pain, fever, and diarrhea.     States he was seen on at urgent care Sunday and started on Augmentin for right ear infection. Symptoms started with sinus symptoms on Thursday.     States since he started the Augmentin he has started having diarrhea. Has had diarrhea x 2 today. States the diarrhea is now causing his rectum to burn.   States he continues with fever up to 101. Taking Tylenol and Motrin PRN.           Diarrhea   This is a new problem. The current episode started in the past 7 days. The problem occurs 2 to 4 times per day. The problem has been unchanged. Pertinent negatives include no abdominal pain, chills, coughing, fever or vomiting. Risk factors: Augmentin. He has tried nothing for the symptoms. The  treatment provided no relief.     Review of Systems   Constitutional:  Negative for activity change, appetite change, chills, diaphoresis and fever.   HENT:  Negative for congestion, ear pain, postnasal drip, sinus pressure, sneezing and sore throat.    Eyes:  Negative for pain, discharge, redness and itching.   Respiratory:  Negative for apnea, cough, chest tightness, shortness of breath and wheezing.    Cardiovascular:  Negative for chest pain and leg swelling.   Gastrointestinal:  Positive for diarrhea. Negative for abdominal distention, abdominal pain, constipation, nausea and vomiting.   Genitourinary:  Negative for difficulty urinating, dysuria, flank pain and frequency.   Skin:  Negative for color change, rash and wound.   Neurological:  Negative for dizziness.   All other systems reviewed and are negative.      Problem List[1]    Objective:      Physical Exam  Constitutional:       General: He is not in acute distress.     Appearance: Normal appearance. He is not ill-appearing.   HENT:      Head: Normocephalic.   Eyes:      Conjunctiva/sclera: Conjunctivae normal.      Pupils: Pupils are equal, round, and reactive to light.      Comments: As seen on virtual visit   Pulmonary:      Effort: Pulmonary effort is normal. No respiratory distress.   Musculoskeletal:      Cervical back: Normal range of motion and neck supple.   Skin:     General: Skin is warm and dry.   Neurological:      General: No focal deficit present.      Mental Status: He is alert and oriented to person, place, and time.   Psychiatric:         Mood and Affect: Mood normal.         Behavior: Behavior normal.         Lab Results   Component Value Date    WBC 6.98 05/15/2025    HGB 15.6 05/15/2025    HCT 46.4 05/15/2025     05/15/2025    CHOL 226 (H) 03/07/2025    TRIG 88 03/07/2025    HDL 56 03/07/2025    ALT 39 05/15/2025    AST 32 05/15/2025     05/15/2025    K 4.2 05/15/2025     05/15/2025    CREATININE 1.1 05/15/2025     BUN 15 05/15/2025    CO2 26 05/15/2025    TSH 1.551 03/07/2025    PSA 0.99 03/07/2025    HGBA1C 5.3 03/07/2025     The 10-year ASCVD risk score (Stacey HUSSEIN, et al., 2019) is: 2.2%    Values used to calculate the score:      Age: 49 years      Sex: Male      Is Non- : No      Diabetic: No      Tobacco smoker: No      Systolic Blood Pressure: 113 mmHg      Is BP treated: No      HDL Cholesterol: 56 mg/dL      Total Cholesterol: 226 mg/dL    Assessment:       1. Right otitis media, unspecified otitis media type    2. Fever, unspecified fever cause    3. Diarrhea, unspecified type        Plan:       1. Right otitis media, unspecified otitis media type/ Fever, unspecified fever cause  -     cefdinir (OMNICEF) 300 MG capsule; Take 1 capsule (300 mg total) by mouth 2 (two) times daily. for 10 days  Dispense: 20 capsule; Refill: 0  - Rotate Tylenol and Motrin as directed for pain and fever   - The diagnosis, treatment plan, instructions for follow-up and reevaluation as well as ED precautions were discussed and understanding was verbalized. All questions or concerns have been addressed.     2. Diarrhea, unspecified type   - [place] Diet   - Increase fluid intake   - If no improvement may need CT scan   - The diagnosis, treatment plan, instructions for follow-up and reevaluation as well as ED precautions were discussed and understanding was verbalized. All questions or concerns have been addressed.        Follow up if symptoms worsen or fail to improve.                [1]   Patient Active Problem List  Diagnosis    Anxiety    Gastritis determined by endoscopy    Impingement syndrome of right shoulder    Weakness of shoulder    Derangement of posterior horn of medial meniscus    SLAP (superior glenoid labrum lesion)    Elevated LFTs

## 2025-05-15 ENCOUNTER — HOSPITAL ENCOUNTER (EMERGENCY)
Facility: HOSPITAL | Age: 50
Discharge: HOME | End: 2025-05-15
Attending: EMERGENCY MEDICINE
Payer: COMMERCIAL

## 2025-05-15 VITALS
OXYGEN SATURATION: 98 % | TEMPERATURE: 99 F | SYSTOLIC BLOOD PRESSURE: 113 MMHG | RESPIRATION RATE: 16 BRPM | HEIGHT: 66 IN | HEART RATE: 96 BPM | BODY MASS INDEX: 26.58 KG/M2 | WEIGHT: 165.38 LBS | DIASTOLIC BLOOD PRESSURE: 52 MMHG

## 2025-05-15 DIAGNOSIS — K62.89 PROCTITIS: ICD-10-CM

## 2025-05-15 DIAGNOSIS — K52.9 ENTERITIS: ICD-10-CM

## 2025-05-15 DIAGNOSIS — K59.00 CONSTIPATION, UNSPECIFIED CONSTIPATION TYPE: ICD-10-CM

## 2025-05-15 DIAGNOSIS — R10.9 ABDOMINAL PAIN, UNSPECIFIED ABDOMINAL LOCATION: Primary | ICD-10-CM

## 2025-05-15 LAB
ABSOLUTE EOSINOPHIL (SMH): 0.04 K/UL
ABSOLUTE MONOCYTE (SMH): 0.8 K/UL (ref 0.3–1)
ABSOLUTE NEUTROPHIL COUNT (SMH): 5.3 K/UL (ref 1.8–7.7)
ALBUMIN SERPL-MCNC: 3.8 G/DL (ref 3.5–5.2)
ALP SERPL-CCNC: 50 UNIT/L (ref 40–150)
ALT SERPL-CCNC: 39 UNIT/L (ref 10–44)
ANION GAP (SMH): 10 MMOL/L (ref 8–16)
AST SERPL-CCNC: 32 UNIT/L (ref 11–45)
BASOPHILS # BLD AUTO: 0.03 K/UL
BASOPHILS NFR BLD AUTO: 0.4 %
BILIRUB SERPL-MCNC: 0.5 MG/DL (ref 0.1–1)
BILIRUB UR QL STRIP.AUTO: NEGATIVE
BUN SERPL-MCNC: 15 MG/DL (ref 6–20)
CALCIUM SERPL-MCNC: 9 MG/DL (ref 8.7–10.5)
CHLORIDE SERPL-SCNC: 104 MMOL/L (ref 95–110)
CLARITY UR: CLEAR
CO2 SERPL-SCNC: 26 MMOL/L (ref 23–29)
COLOR UR AUTO: YELLOW
CREAT SERPL-MCNC: 1.1 MG/DL (ref 0.5–1.4)
ERYTHROCYTE [DISTWIDTH] IN BLOOD BY AUTOMATED COUNT: 12.3 % (ref 11.5–14.5)
GFR SERPLBLD CREATININE-BSD FMLA CKD-EPI: >60 ML/MIN/1.73/M2
GLUCOSE SERPL-MCNC: 102 MG/DL (ref 70–110)
GLUCOSE UR QL STRIP: NEGATIVE
HCT VFR BLD AUTO: 46.4 % (ref 40–54)
HGB BLD-MCNC: 15.6 GM/DL (ref 14–18)
HGB UR QL STRIP: NEGATIVE
IMM GRANULOCYTES # BLD AUTO: 0.03 K/UL (ref 0–0.04)
IMM GRANULOCYTES NFR BLD AUTO: 0.4 % (ref 0–0.5)
KETONES UR QL STRIP: NEGATIVE
LEUKOCYTE ESTERASE UR QL STRIP: NEGATIVE
LIPASE SERPL-CCNC: 26 U/L (ref 4–60)
LYMPHOCYTES # BLD AUTO: 0.76 K/UL (ref 1–4.8)
MCH RBC QN AUTO: 30.8 PG (ref 27–31)
MCHC RBC AUTO-ENTMCNC: 33.6 G/DL (ref 32–36)
MCV RBC AUTO: 92 FL (ref 82–98)
NITRITE UR QL STRIP: NEGATIVE
NUCLEATED RBC (/100WBC) (SMH): 0 /100 WBC
PH UR STRIP: 7 [PH]
PLATELET # BLD AUTO: 178 K/UL (ref 150–450)
PMV BLD AUTO: 9.4 FL (ref 9.2–12.9)
POTASSIUM SERPL-SCNC: 4.2 MMOL/L (ref 3.5–5.1)
PROT SERPL-MCNC: 7.1 GM/DL (ref 6–8.4)
PROT UR QL STRIP: NEGATIVE
RBC # BLD AUTO: 5.07 M/UL (ref 4.6–6.2)
RELATIVE EOSINOPHIL (SMH): 0.6 % (ref 0–8)
RELATIVE LYMPHOCYTE (SMH): 10.9 % (ref 18–48)
RELATIVE MONOCYTE (SMH): 11.5 % (ref 4–15)
RELATIVE NEUTROPHIL (SMH): 76.2 % (ref 38–73)
SODIUM SERPL-SCNC: 140 MMOL/L (ref 136–145)
SP GR UR STRIP: 1.02
UROBILINOGEN UR STRIP-ACNC: NEGATIVE EU/DL
WBC # BLD AUTO: 6.98 K/UL (ref 3.9–12.7)

## 2025-05-15 PROCEDURE — 82247 BILIRUBIN TOTAL: CPT | Performed by: EMERGENCY MEDICINE

## 2025-05-15 PROCEDURE — 25500020 PHARM REV CODE 255

## 2025-05-15 PROCEDURE — 81003 URINALYSIS AUTO W/O SCOPE: CPT | Performed by: EMERGENCY MEDICINE

## 2025-05-15 PROCEDURE — 99285 EMERGENCY DEPT VISIT HI MDM: CPT | Mod: 25

## 2025-05-15 PROCEDURE — 96374 THER/PROPH/DIAG INJ IV PUSH: CPT

## 2025-05-15 PROCEDURE — 85025 COMPLETE CBC W/AUTO DIFF WBC: CPT | Performed by: EMERGENCY MEDICINE

## 2025-05-15 PROCEDURE — 83690 ASSAY OF LIPASE: CPT | Performed by: EMERGENCY MEDICINE

## 2025-05-15 PROCEDURE — 25000003 PHARM REV CODE 250: Performed by: EMERGENCY MEDICINE

## 2025-05-15 PROCEDURE — 36415 COLL VENOUS BLD VENIPUNCTURE: CPT | Performed by: EMERGENCY MEDICINE

## 2025-05-15 PROCEDURE — 63600175 PHARM REV CODE 636 W HCPCS: Performed by: EMERGENCY MEDICINE

## 2025-05-15 PROCEDURE — 96361 HYDRATE IV INFUSION ADD-ON: CPT

## 2025-05-15 RX ORDER — METRONIDAZOLE 500 MG/1
500 TABLET ORAL EVERY 12 HOURS
Qty: 14 TABLET | Refills: 0 | Status: SHIPPED | OUTPATIENT
Start: 2025-05-15 | End: 2025-05-22

## 2025-05-15 RX ORDER — CIPROFLOXACIN 500 MG/1
500 TABLET, FILM COATED ORAL 2 TIMES DAILY
Qty: 14 TABLET | Refills: 0 | Status: SHIPPED | OUTPATIENT
Start: 2025-05-15 | End: 2025-05-22

## 2025-05-15 RX ORDER — SUCRALFATE 1 G/10ML
1 SUSPENSION ORAL EVERY 6 HOURS
Status: DISCONTINUED | OUTPATIENT
Start: 2025-05-15 | End: 2025-05-15

## 2025-05-15 RX ORDER — ALUMINUM HYDROXIDE, MAGNESIUM HYDROXIDE, AND SIMETHICONE 1200; 120; 1200 MG/30ML; MG/30ML; MG/30ML
30 SUSPENSION ORAL
Status: DISCONTINUED | OUTPATIENT
Start: 2025-05-15 | End: 2025-05-15

## 2025-05-15 RX ORDER — PANTOPRAZOLE SODIUM 40 MG/10ML
40 INJECTION, POWDER, LYOPHILIZED, FOR SOLUTION INTRAVENOUS
Status: COMPLETED | OUTPATIENT
Start: 2025-05-15 | End: 2025-05-15

## 2025-05-15 RX ORDER — ACETAMINOPHEN 500 MG
1000 TABLET ORAL
Status: COMPLETED | OUTPATIENT
Start: 2025-05-15 | End: 2025-05-15

## 2025-05-15 RX ORDER — HYDROCORTISONE ACETATE 25 MG/1
25 SUPPOSITORY RECTAL 2 TIMES DAILY
Qty: 14 SUPPOSITORY | Refills: 0 | Status: SHIPPED | OUTPATIENT
Start: 2025-05-15 | End: 2025-05-22

## 2025-05-15 RX ADMIN — IOHEXOL 75 ML: 350 INJECTION, SOLUTION INTRAVENOUS at 08:05

## 2025-05-15 RX ADMIN — SODIUM CHLORIDE, POTASSIUM CHLORIDE, SODIUM LACTATE AND CALCIUM CHLORIDE 1000 ML: 600; 310; 30; 20 INJECTION, SOLUTION INTRAVENOUS at 08:05

## 2025-05-15 RX ADMIN — ACETAMINOPHEN 1000 MG: 500 TABLET ORAL at 08:05

## 2025-05-15 RX ADMIN — PANTOPRAZOLE SODIUM 40 MG: 40 INJECTION, POWDER, FOR SOLUTION INTRAVENOUS at 08:05

## 2025-05-15 NOTE — ED PROVIDER NOTES
Encounter Date: 5/15/2025       History     Chief Complaint   Patient presents with    Otalgia     Augmentin was started Sunday with a decadron shot from urgent care    Diarrhea     Monday told to stop augmentin and start cefdinir.  Hx IBS    Fever     Max of 101.4 yesterday. Fevers started Monday    Dysuria     Started Monday     Headache     Started Monday     Abdominal Pain     Started Monday     pressure at rectum      Started Monday. Feels like he has to bm but nothing passes other some pinkish red mucus     49-year-old male with history of irritable bowel syndrome, diverticulosis, peptic ulcer disease.  Patient presents to emergency department with complaint of persistent lower abdominal pain since Sunday.  This has been associated with loose watery diarrheal stools which has stopped on Monday however patient states has had persistent daily fevers in the afternoon of 101.  Patient states has had mucus slightly bloody type stool that he noticed on Monday as well.  Denies we will contact, no hematemesis, no URI symptoms except for nonproductive cough.  Patient was initially started on Augmentin and then transitioned to cefdinir a virtual visit yesterday.  Patient decided to come to the emergency department today due to feelings of dysuria and lower abdominal pain.  He denies hematuria, no history of BPH or urinary retention.  No bowel bladder incontinence, no lower extremity weakness.      Review of patient's allergies indicates:   Allergen Reactions    Bactrim [sulfamethoxazole-trimethoprim] Anaphylaxis, Hives and Swelling    Doxycycline Other (See Comments)     constipation    Other Reaction(s): Not available     Past Medical History:   Diagnosis Date    Colon polyp     Diverticulosis     Epidermoid cyst of testis     left    H. pylori infection     Hydrocele, left     IBS (irritable bowel syndrome)     Peptic ulcer disease     Shoulder pain, right      Past Surgical History:   Procedure Laterality Date     ADENOIDECTOMY      ARTHROSCOPIC ACROMIOPLASTY OF SHOULDER Right 09/10/2020    Procedure: ACROMIOPLASTY, ARTHROSCOPIC;  Surgeon: Gagan Astorga MD;  Location: [facility];  Service: Orthopedics;  Laterality: Right;    ARTHROSCOPIC DEBRIDEMENT OF SHOULDER Right 03/26/2021    Procedure: DEBRIDEMENT, SHOULDER, ARTHROSCOPIC;  Surgeon: Los Lopez MD;  Location: [facility];  Service: Orthopedics;  Laterality: Right;    COLONOSCOPY  06/2015    repeat in 5 years for surveillance    COLONOSCOPY N/A 06/04/2020    Procedure: COLONOSCOPY;  Surgeon: Sheila Adams MD;  Location: [facility] ENDO;  Service: Endoscopy;  Laterality: N/A;    COLONOSCOPY N/A 10/13/2023    Procedure: COLONOSCOPY;  Surgeon: Sheila Adams MD;  Location: [facility] ENDO;  Service: Endoscopy;  Laterality: N/A;    DISTAL CLAVICLE EXCISION Right 09/10/2020    Procedure: EXCISION, CLAVICLE, DISTAL;  Surgeon: Gagan Astorga MD;  Location: [facility];  Service: Orthopedics;  Laterality: Right;    ESOPHAGOGASTRODUODENOSCOPY N/A 06/04/2020    Procedure: EGD (ESOPHAGOGASTRODUODENOSCOPY);  Surgeon: Sheila Adams MD;  Location: [facility];  Service: Endoscopy;  Laterality: N/A;    FIXATION OF TENDON Right 03/26/2021    Procedure: FIXATION, TENDON - RIGHT SHOULDER;  Surgeon: Los Lopez MD;  Location: [facility];  Service: Orthopedics;  Laterality: Right;    KNEE ARTHROSCOPY W/ DEBRIDEMENT  2012    right knee     KNEE ARTHROSCOPY W/ MENISCECTOMY Left 03/05/2021    Procedure: ARTHROSCOPY, KNEE, WITH MENISCECTOMY;  Surgeon: Los Lopez MD;  Location: [facility];  Service: Orthopedics;  Laterality: Left;    TONSILLECTOMY      UPPER GASTROINTESTINAL ENDOSCOPY  05/2015    repeat in 8 weeks to reassess ulcer for healing     Family History   Problem Relation Name Age of Onset    Diabetes Mother      Heart disease Mother  50        CABG x3    Melanoma Father      Cancer Maternal Grandfather  64        colon cancer    Colon cancer Maternal Grandfather  65     Crohn's disease Neg Hx      Ulcerative colitis Neg Hx      Celiac disease Neg Hx       Social History[1]  Review of Systems   Constitutional:  Negative for fever.   HENT:  Negative for sore throat.    Respiratory:  Negative for shortness of breath.    Cardiovascular:  Negative for chest pain.   Gastrointestinal:  Positive for abdominal pain and diarrhea. Negative for nausea and vomiting.   Genitourinary:  Positive for decreased urine volume and dysuria. Negative for flank pain, frequency, hematuria, penile discharge, penile pain, penile swelling, testicular pain and urgency.   Musculoskeletal:  Negative for back pain.   Skin:  Negative for rash.   Neurological:  Negative for weakness and light-headedness.   Hematological:  Does not bruise/bleed easily.       Physical Exam     Initial Vitals [05/15/25 0704]   BP Pulse Resp Temp SpO2   133/69 (!) 111 16 98.8 °F (37.1 °C) 99 %      MAP       --         Physical Exam    Nursing note and vitals reviewed.  Constitutional: He appears well-developed and well-nourished.   HENT:   Head: Normocephalic and atraumatic.   Nose: Nose normal. Mouth/Throat: Oropharynx is clear and moist.   Eyes: Conjunctivae and EOM are normal. Pupils are equal, round, and reactive to light. No scleral icterus.   Neck: Neck supple.   Normal range of motion.  Cardiovascular:  Normal rate, regular rhythm, normal heart sounds and intact distal pulses.     Exam reveals no gallop and no friction rub.       No murmur heard.  Pulmonary/Chest: Breath sounds normal. No stridor. No respiratory distress.   Abdominal: Abdomen is soft. Bowel sounds are normal. He exhibits no mass. There is no abdominal tenderness.   Mild bilateral lower quadrant tenderness with mild suprapubic tenderness.  No rebound or guarding noted.  There is no rebound and no guarding.   Musculoskeletal:         General: No edema. Normal range of motion.      Cervical back: Normal range of motion and neck supple.     Lymphadenopathy:      He has no cervical adenopathy.   Neurological: He is alert and oriented to person, place, and time. He has normal strength and normal reflexes. No cranial nerve deficit or sensory deficit. GCS score is 15. GCS eye subscore is 4. GCS verbal subscore is 5. GCS motor subscore is 6.   Skin: Skin is warm and dry. Capillary refill takes less than 2 seconds. No rash noted.   Psychiatric: He has a normal mood and affect. His behavior is normal. Judgment and thought content normal.         ED Course   Procedures  Labs Reviewed   CBC WITH DIFFERENTIAL - Abnormal       Result Value    WBC 6.98      RBC 5.07      Hgb 15.6      Hct 46.4      MCV 92      MCH 30.8      MCHC 33.6      RDW 12.3      Platelet Count 178      MPV 9.4      Nucleated RBC 0      Neut % 76.2 (*)     Lymph % 10.9 (*)     Mono % 11.5      Eos % 0.6      Basophil % 0.4      Imm Grans % 0.4      Neut # 5.3      Lymph # 0.76 (*)     Mono # 0.80      Eos # 0.04      Baso # 0.03      Imm Grans # 0.03     COMPREHENSIVE METABOLIC PANEL - Normal    Sodium 140      Potassium 4.2      Chloride 104      CO2 26      Glucose 102      BUN 15      Creatinine 1.1      Calcium 9.0      Protein Total 7.1      Albumin 3.8      Bilirubin Total 0.5      ALP 50      AST 32      ALT 39      Anion Gap 10      eGFR >60     LIPASE - Normal    Lipase Level 26     URINALYSIS, REFLEX TO URINE CULTURE - Normal    Color, UA Yellow      Appearance, UA Clear      Spec Grav UA 1.025      pH, UA 7.0      Protein, UA Negative      Glucose, UA Negative      Ketones, UA Negative      Blood, UA Negative      Bilirubin, UA Negative      Urobilinogen, UA Negative      Nitrites, UA Negative      Leukocyte Esterase, UA Negative     CBC W/ AUTO DIFFERENTIAL    Narrative:     The following orders were created for panel order Complete Blood Count (CBC).  Procedure                               Abnormality         Status                     ---------                               -----------          ------                     CBC with Differential[[id number]]       Abnormal            Final result                 Please view results for these tests on the individual orders.          Imaging Results              CT Abdomen Pelvis With IV Contrast NO Oral Contrast (Final result)  Result time 05/15/25 09:42:00      Final result by Keren Dela Cruz MD (05/15/25 09:42:00)                   Impression:      There is mild left pelvicaliectasis without evidence obstructing calculus.  Findings may represent recently passed calculus.    There is a large amount of stool seen throughout the colon which may be seen with constipation.      Electronically signed by: Keren Dela Cruz MD  Date:    05/15/2025  Time:    09:42               Narrative:    EXAMINATION:  CT ABDOMEN PELVIS WITH IV CONTRAST    CLINICAL HISTORY:  abdominal pain;    TECHNIQUE:  Low dose axial images, sagittal and coronal reformations were obtained from the lung bases to the pubic symphysis following the IV administration of 75 mL of Omnipaque 350 and the oral administration of 30 mL of Omnipaque 350.    COMPARISON:  None.    FINDINGS:  The lung bases are clear.    The liver, spleen, adrenal glands, right kidney and pancreas are unremarkable.  There is mild left pelvicaliectasis without visualization of obstructing calculus.    There is no evidence bowel obstruction.  There is a large amount of stool seen throughout the colon.  The appendix was not visualized.  There is no evidence abdominal nor pelvic lymphadenopathy.  The osseous structures are unremarkable.                                       Medications   lactated ringers bolus 1,000 mL (1,000 mLs Intravenous New Bag 5/15/25 0800)   pantoprazole injection 40 mg (40 mg Intravenous Given 5/15/25 0801)   acetaminophen tablet 1,000 mg (1,000 mg Oral Given 5/15/25 0850)   iohexoL (OMNIPAQUE 350) 350 mg iodine/mL injection (75 mLs Intravenous Given 5/15/25 0838)     Medical Decision Making  Amount and/or  Complexity of Data Reviewed  Labs: ordered.  Radiology: ordered.    Risk  OTC drugs.  Prescription drug management.                          Medical Decision Making:   Initial Assessment:   49-year-old male with history of irritable bowel syndrome, diverticulosis, peptic ulcer disease.  Patient presents to emergency department with complaint of persistent lower abdominal pain since Sunday.  This has been associated with loose watery diarrheal stools which has stopped on Monday however patient states has had persistent daily fevers in the afternoon of 101.  Patient states has had mucus slightly bloody type stool that he noticed on Monday as well.  Denies we will contact, no hematemesis, no URI symptoms except for nonproductive cough.  Patient was initially started on Augmentin and then transitioned to cefdinir a virtual visit yesterday.  Patient decided to come to the emergency department today due to feelings of dysuria and lower abdominal pain.  He denies hematuria, no history of BPH or urinary retention.  No bowel bladder incontinence, no lower extremity weakness.    Differential Diagnosis:   Viral enterocolitis, colitis, IBS, cystitis, prostatitis, urethritis, dehydration  Clinical Tests:   Lab Tests: Ordered and Reviewed  Radiological Study: Ordered and Reviewed             Clinical Impression:  Final diagnoses:  [R10.9] Abdominal pain, unspecified abdominal location (Primary)  [K52.9] Enteritis                     Faheem Jeff MD  05/15/25 1000         [1]   Social History  Tobacco Use    Smoking status: Never     Passive exposure: Never    Smokeless tobacco: Never   Substance Use Topics    Alcohol use: No    Drug use: No        Faheem Jeff MD  05/15/25 1013

## 2025-05-16 ENCOUNTER — HOSPITAL ENCOUNTER (EMERGENCY)
Facility: HOSPITAL | Age: 50
Discharge: HOME | End: 2025-05-16
Attending: EMERGENCY MEDICINE
Payer: COMMERCIAL

## 2025-05-16 VITALS
DIASTOLIC BLOOD PRESSURE: 61 MMHG | OXYGEN SATURATION: 99 % | HEART RATE: 84 BPM | SYSTOLIC BLOOD PRESSURE: 133 MMHG | RESPIRATION RATE: 18 BRPM | TEMPERATURE: 98 F

## 2025-05-16 DIAGNOSIS — K60.2 ANAL FISSURE: ICD-10-CM

## 2025-05-16 DIAGNOSIS — K62.89 RECTAL PAIN: Primary | ICD-10-CM

## 2025-05-16 PROCEDURE — 99284 EMERGENCY DEPT VISIT MOD MDM: CPT

## 2025-05-16 RX ORDER — HYDROCODONE BITARTRATE AND ACETAMINOPHEN 5; 325 MG/1; MG/1
1 TABLET ORAL EVERY 6 HOURS PRN
Qty: 12 TABLET | Refills: 0 | Status: SHIPPED | OUTPATIENT
Start: 2025-05-16

## 2025-05-17 NOTE — DISCHARGE INSTRUCTIONS
Return to the ER for any concerns.  Follow up with your gastroenterology and General surgery Clinic.

## 2025-05-17 NOTE — ED PROVIDER NOTES
Encounter Date: 5/16/2025       History     Chief Complaint   Patient presents with    Rectal Bleeding     C/o worsening rectal pain with constipation, seen here for same.      This is a 49-year-old male return to the ED with chief complaint of rectal pain.  He says he was started on Augmentin for an ear infection 5 days ago, and then 4 days ago developed diarrhea and fever.  He says stools have mostly stopped, has had some mucus with gas and some blood after wiping.  He was seen in the ED yesterday for this, had CT abdomen that was negative for any diverticulitis or perirectal abscess, did show constipation but no other acute intra-abdominal process.  He was switched to Cipro and Flagyl.  He says that his main complaint today is his persistent severe rectal pain.  He says that the pain is severe and that any attempted defecation causes severe pain.    The history is provided by the patient.     Review of patient's allergies indicates:   Allergen Reactions    Bactrim [sulfamethoxazole-trimethoprim] Anaphylaxis, Hives and Swelling    Doxycycline Other (See Comments)     constipation    Other Reaction(s): Not available     Past Medical History:   Diagnosis Date    Colon polyp     Diverticulosis     Epidermoid cyst of testis     left    H. pylori infection     Hydrocele, left     IBS (irritable bowel syndrome)     Peptic ulcer disease     Shoulder pain, right      Past Surgical History:   Procedure Laterality Date    ADENOIDECTOMY      ARTHROSCOPIC ACROMIOPLASTY OF SHOULDER Right 09/10/2020    Procedure: ACROMIOPLASTY, ARTHROSCOPIC;  Surgeon: Gagan Astorga MD;  Location: [facility] OR;  Service: Orthopedics;  Laterality: Right;    ARTHROSCOPIC DEBRIDEMENT OF SHOULDER Right 03/26/2021    Procedure: DEBRIDEMENT, SHOULDER, ARTHROSCOPIC;  Surgeon: Los Lopez MD;  Location: [facility] OR;  Service: Orthopedics;  Laterality: Right;    COLONOSCOPY  06/2015    repeat in 5 years for surveillance    COLONOSCOPY N/A 06/04/2020     Procedure: COLONOSCOPY;  Surgeon: Sheila Adams MD;  Location: [facility] ENDO;  Service: Endoscopy;  Laterality: N/A;    COLONOSCOPY N/A 10/13/2023    Procedure: COLONOSCOPY;  Surgeon: Sheila Adams MD;  Location: [facility] ENDO;  Service: Endoscopy;  Laterality: N/A;    DISTAL CLAVICLE EXCISION Right 09/10/2020    Procedure: EXCISION, CLAVICLE, DISTAL;  Surgeon: Gagan Astorga MD;  Location: [facility] OR;  Service: Orthopedics;  Laterality: Right;    ESOPHAGOGASTRODUODENOSCOPY N/A 06/04/2020    Procedure: EGD (ESOPHAGOGASTRODUODENOSCOPY);  Surgeon: Sheila Adams MD;  Location: [facility] ENDO;  Service: Endoscopy;  Laterality: N/A;    FIXATION OF TENDON Right 03/26/2021    Procedure: FIXATION, TENDON - RIGHT SHOULDER;  Surgeon: Los Lopez MD;  Location: [facility];  Service: Orthopedics;  Laterality: Right;    KNEE ARTHROSCOPY W/ DEBRIDEMENT  2012    right knee     KNEE ARTHROSCOPY W/ MENISCECTOMY Left 03/05/2021    Procedure: ARTHROSCOPY, KNEE, WITH MENISCECTOMY;  Surgeon: Los Lopez MD;  Location: [facility];  Service: Orthopedics;  Laterality: Left;    TONSILLECTOMY      UPPER GASTROINTESTINAL ENDOSCOPY  05/2015    repeat in 8 weeks to reassess ulcer for healing     Family History   Problem Relation Name Age of Onset    Diabetes Mother      Heart disease Mother  50        CABG x3    Melanoma Father      Cancer Maternal Grandfather  64        colon cancer    Colon cancer Maternal Grandfather  65    Crohn's disease Neg Hx      Ulcerative colitis Neg Hx      Celiac disease Neg Hx       Social History[1]  Review of Systems   Gastrointestinal:  Positive for rectal pain.   All other systems reviewed and are negative.      Physical Exam     Initial Vitals [05/16/25 2100]   BP Pulse Resp Temp SpO2   133/61 91 16 98.4 °F (36.9 °C) 97 %      MAP       --         Physical Exam    Nursing note and vitals reviewed.  Constitutional: He appears well-developed and well-nourished. He is not diaphoretic. No  distress.   HENT:   Head: Normocephalic and atraumatic.   Eyes: Conjunctivae are normal.   Neck: Neck supple.   Normal range of motion.  Cardiovascular:  Normal rate.           Pulmonary/Chest: No respiratory distress.   Abdominal: He exhibits no distension.   Genitourinary:    Genitourinary Comments: Exquisite anal tenderness, appears to be a small anal fissure of the anterior anal verge.     Musculoskeletal:         General: No edema.      Cervical back: Normal range of motion and neck supple.     Neurological: He is alert. He has normal strength.   Skin: Skin is warm and dry. No rash noted. No erythema.   Psychiatric: He has a normal mood and affect.         ED Course   Procedures  Labs Reviewed - No data to display       Imaging Results    None          Medications - No data to display  Medical Decision Making  On exam patient appears to have an anal fissure.  He had extensive workup including labs and CT that were negative yesterday and I do not think he requires any further diagnostic studies at this time.  I will write him for nifedipine ointment, as well as a small amount of hydrocodone.  I advised increasing his MiraLax to obtain a loose stool consistency.  I will refer to Gastroenterology and General surgery Clinics.  Discharged home at this time.    Risk  Prescription drug management.                                      Clinical Impression:  Final diagnoses:  [K62.89] Rectal pain (Primary)  [K60.2] Anal fissure          ED Disposition Condition    Discharge Stable          ED Prescriptions       Medication Sig Dispense Start Date End Date Auth. Provider    nifedipine 0.3%-lidocaine 1.5% topical ointment Apply topically 3 (three) times daily. Apply a pea sized amount three times a day to the anus. 30 g 5/16/2025 6/13/2025 Robel Kay MD    HYDROcodone-acetaminophen (NORCO) 5-325 mg per tablet Take 1 tablet by mouth every 6 (six) hours as needed for Pain. 12 tablet 5/16/2025 -- Robel Kay MD           Follow-up Information    None              [1]   Social History  Tobacco Use    Smoking status: Never     Passive exposure: Never    Smokeless tobacco: Never   Substance Use Topics    Alcohol use: No    Drug use: No        Robel Kay MD  05/16/25 6448

## 2025-05-19 ENCOUNTER — OFFICE VISIT (OUTPATIENT)
Dept: GASTROENTEROLOGY | Facility: CLINIC | Age: 50
End: 2025-05-19
Payer: COMMERCIAL

## 2025-05-19 ENCOUNTER — HOSPITAL ENCOUNTER (OUTPATIENT)
Dept: RADIOLOGY | Facility: HOSPITAL | Age: 50
Discharge: HOME | End: 2025-05-19
Payer: COMMERCIAL

## 2025-05-19 ENCOUNTER — RESULTS FOLLOW-UP (OUTPATIENT)
Dept: GASTROENTEROLOGY | Facility: CLINIC | Age: 50
End: 2025-05-19

## 2025-05-19 VITALS
DIASTOLIC BLOOD PRESSURE: 56 MMHG | WEIGHT: 169.56 LBS | BODY MASS INDEX: 27.36 KG/M2 | SYSTOLIC BLOOD PRESSURE: 119 MMHG | HEART RATE: 75 BPM

## 2025-05-19 DIAGNOSIS — K59.00 CONSTIPATION, UNSPECIFIED CONSTIPATION TYPE: Primary | ICD-10-CM

## 2025-05-19 DIAGNOSIS — K62.89 RECTAL PAIN: ICD-10-CM

## 2025-05-19 DIAGNOSIS — K60.2 ANAL FISSURE: ICD-10-CM

## 2025-05-19 DIAGNOSIS — K59.00 CONSTIPATION, UNSPECIFIED CONSTIPATION TYPE: ICD-10-CM

## 2025-05-19 PROCEDURE — 99213 OFFICE O/P EST LOW 20 MIN: CPT | Mod: S$GLB,,,

## 2025-05-19 PROCEDURE — 74018 RADEX ABDOMEN 1 VIEW: CPT | Mod: 26,,, | Performed by: RADIOLOGY

## 2025-05-19 PROCEDURE — 99999 PR PBB SHADOW E&M-EST. PATIENT-LVL IV: CPT | Mod: PBBFAC,,,

## 2025-05-19 PROCEDURE — 74018 RADEX ABDOMEN 1 VIEW: CPT | Mod: TC

## 2025-05-19 NOTE — PROGRESS NOTES
Subjective:       Patient ID: Rios Buck is a 49 y.o. male Body mass index is 27.36 kg/m².    Chief Complaint: Constipation    This patient is new to me.  Referring Provider: Dr. Robel ESPANA [place] for constipation and anal pain.  Established patient of Dr. Adams and KENYA Goodrich, NP.     Last Sunday had diarrhea from antibiotics given at urgent care for an ear ache.     Took some laxative and started having some BMs but has been having   Started miralax Friday and has been doing it daily since - and still cannot go to the bathroom.  Stomach hurts and back hurts.  Been taking stool softener    5/16/25 ER Visit [facility]  Rectal Bleeding       C/o worsening rectal pain with constipation, seen here for same.      This is a 49-year-old male return to the ED with chief complaint of rectal pain.  He says he was started on Augmentin for an ear infection 5 days ago, and then 4 days ago developed diarrhea and fever.  He says stools have mostly stopped, has had some mucus with gas and some blood after wiping.  He was seen in the ED yesterday for this, had CT abdomen that was negative for any diverticulitis or perirectal abscess, did show constipation but no other acute intra-abdominal process.  He was switched to Cipro and Flagyl.  He says that his main complaint today is his persistent severe rectal pain.  He says that the pain is severe and that any attempted defecation causes severe pain.    Medical Decision Making  On exam patient appears to have an anal fissure.  He had extensive workup including labs and CT that were negative yesterday and I do not think he requires any further diagnostic studies at this time.  I will write him for nifedipine ointment, as well as a small amount of hydrocodone.  I advised increasing his MiraLax to obtain a loose stool consistency.  I will refer to Gastroenterology and General surgery Clinics.  Discharged home at this time.     Risk  Prescription drug management.    5/15/25 ER Visit  [facility]  · Otalgia      Augmentin was started Sunday with a decadron shot from urgent care  · Diarrhea      Monday told to stop augmentin and start cefdinir.  Hx IBS  · Fever      Max of 101.4 yesterday. Fevers started Monday  · Dysuria      Started Monday   · Headache      Started Monday   · Abdominal Pain      Started Monday   · pressure at rectum       Started Monday. Feels like he has to bm but nothing passes other some pinkish red mucus     49-year-old male with history of irritable bowel syndrome, diverticulosis, peptic ulcer disease.  Patient presents to emergency department with complaint of persistent lower abdominal pain since Sunday.  This has been associated with loose watery diarrheal stools which has stopped on Monday however patient states has had persistent daily fevers in the afternoon of 101.  Patient states has had mucus slightly bloody type stool that he noticed on Monday as well.  Denies we will contact, no hematemesis, no URI symptoms except for nonproductive cough.  Patient was initially started on Augmentin and then transitioned to cefdinir a virtual visit yesterday.  Patient decided to come to the emergency department today due to feelings of dysuria and lower abdominal pain.  He denies hematuria, no history of BPH or urinary retention.  No bowel bladder incontinence, no lower extremity weakness.    CT A/P   FINDINGS:  The lung bases are clear.     The liver, spleen, adrenal glands, right kidney and pancreas are unremarkable.  There is mild left pelvicaliectasis without visualization of obstructing calculus.     There is no evidence bowel obstruction.  There is a large amount of stool seen throughout the colon.  The appendix was not visualized.  There is no evidence abdominal nor pelvic lymphadenopathy.  The osseous structures are unremarkable.    Impression:  There is mild left pelvicaliectasis without evidence obstructing calculus.  Findings may represent recently passed calculus.     There  is a large amount of stool seen throughout the colon which may be seen with constipation.    Medical Decision Making:   Initial Assessment:   49-year-old male with history of irritable bowel syndrome, diverticulosis, peptic ulcer disease.  Patient presents to emergency department with complaint of persistent lower abdominal pain since Sunday.  This has been associated with loose watery diarrheal stools which has stopped on Monday however patient states has had persistent daily fevers in the afternoon of 101.  Patient states has had mucus slightly bloody type stool that he noticed on Monday as well.  Denies we will contact, no hematemesis, no URI symptoms except for nonproductive cough.  Patient was initially started on Augmentin and then transitioned to cefdinir a virtual visit yesterday.  Patient decided to come to the emergency department today due to feelings of dysuria and lower abdominal pain.  He denies hematuria, no history of BPH or urinary retention.  No bowel bladder incontinence, no lower extremity weakness.     Differential Diagnosis:   Viral enterocolitis, colitis, IBS, cystitis, prostatitis, urethritis, dehydration  Clinical Tests:   Lab Tests: Ordered and Reviewed  Radiological Study: Ordered and Reviewed      Clinical Impression:  Final diagnoses:  [R10.9] Abdominal pain, unspecified abdominal location (Primary)  [K52.9] Enteritis      9/2023 OV with KENYA Goodrich NP  Patient ID: Rios Buck is a 48 y.o. male Body mass index is 25.26 kg/m².     Chief Complaint: Diarrhea     This patient is new to me.  Established patient of Dr. Adams, Dr. Valentine, and Kelsea Monsivais NP.     Diarrhea   This is a new (started 08/25/2023) problem. The current episode started 1 to 4 weeks ago. Episode frequency: Reports having up to 5 BMs a day; reports having a BM 15 minutes after eating. The problem has been unchanged. The stool consistency is described as Mucous (Rated stool mostly 7, but sometimes 6 on McDowell  scale). The patient states that diarrhea awakens him from sleep. Associated symptoms include abdominal pain (Denies currently; reports lower abdominal cramping that occurs prior to BMs daily; pain lasts until having a BM and then resolves after BMs; also worsens after eating), bloating and increased flatus. Pertinent negatives include no arthralgias, chills, coughing, fever, headaches, myalgias, sweats, URI, vomiting or weight loss. Associated symptoms comments: Patient also reports borborygmus.  He also had episode last week noticing dark watery stool. Nothing aggravates the symptoms. There are no known risk factors (Currently taking NSAIDs p.r.n. once daily for right elbow pain). He has tried anti-motility drug (Currently taking Imodium p.r.n.; past treatments: Bentyl years ago) for the symptoms. The treatment provided no relief. His past medical history is significant for irritable bowel syndrome. There is no history of bowel resection, inflammatory bowel disease, malabsorption, a recent abdominal surgery or short gut syndrome. History of GERD and PUD, but denies heartburn, reflux, indigestion, belching     Diarrhea, unspecified type  - schedule Colonoscopy, discussed procedure with the patient, including risks and benefits, patient verbalized understanding  - recommend OTC probiotic, such as Florastor or Culturelle, taken as directed on packaging  - avoid lactose, alcohol, & caffeine  - avoid known triggers  - Recommended increase fiber in diet, especially soluble fiber since this can help bulk up the stool consistency and may help to slow down how fast the stool goes through the colon and can prevent diarrhea  -     H. pylori antigen, stool; Future; Expected date: 09/12/2023  -     Occult blood x 1, stool; Future; Expected date: 09/12/2023  -     WBC, Stool; Future; Expected date: 09/12/2023  -     Rotavirus antigen, stool; Future; Expected date: 09/12/2023  -     Adenovirus Molecular Detection, PCR, Non-Blood  Stool; Future; Expected date: 09/12/2023  -     Giardia / Cryptosporidum, EIA; Future; Expected date: 09/12/2023  -     Stool Exam-Ova,Cysts,Parasites; Future; Expected date: 09/12/2023  -     Clostridium difficile EIA; Future; Expected date: 09/12/2023  -     Stool culture; Future; Expected date: 09/12/2023  -START: dicyclomine (BENTYL) 10 MG capsule; Take 1 capsule (10 mg total) by mouth 4 (four) times daily before meals and nightly.  Dispense: 120 capsule; Refill: 0  -     CBC Without Differential; Future; Expected date: 09/12/2023  -     Comprehensive Metabolic Panel; Future; Expected date: 09/12/2023  -     TSH; Future; Expected date: 09/12/2023  -     Case Request Endoscopy: COLONOSCOPY  -     TISSUE TRANSGLUTAMINASE (TTG), IGA; Future; Expected date: 09/12/2023  -     IGA; Future; Expected date: 09/12/2023  - consider cholestyramine      Change in bowel habits  - schedule Colonoscopy, discussed procedure with the patient, including risks and benefits, patient verbalized understanding  -     Case Request Endoscopy: COLONOSCOPY     Abdominal cramping  - schedule Colonoscopy, discussed procedure with the patient, including risks and benefits, patient verbalized understanding  -START: dicyclomine (BENTYL) 10 MG capsule; Take 1 capsule (10 mg total) by mouth 4 (four) times daily before meals and nightly.  Dispense: 120 capsule; Refill: 0  -     Case Request Endoscopy: COLONOSCOPY     History of IBS  - schedule Colonoscopy, discussed procedure with the patient, including risks and benefits, patient verbalized understanding  -START: dicyclomine (BENTYL) 10 MG capsule; Take 1 capsule (10 mg total) by mouth 4 (four) times daily before meals and nightly.  Dispense: 120 capsule; Refill: 0  -     Case Request Endoscopy: COLONOSCOPY     Excessive flatus & Abdominal bloating  - schedule Colonoscopy, discussed procedure with the patient, including risks and benefits, patient verbalized understanding  -     H. pylori  antigen, stool; Future; Expected date: 09/12/2023  -     Case Request Endoscopy: COLONOSCOPY  -     TISSUE TRANSGLUTAMINASE (TTG), IGA; Future; Expected date: 09/12/2023  -     IGA; Future; Expected date: 09/12/2023     Borborygmus  - schedule Colonoscopy, discussed procedure with the patient, including risks and benefits, patient verbalized understanding  -     Case Request Endoscopy: COLONOSCOPY     Dark stools  -     Occult blood x 1, stool; Future; Expected date: 09/12/2023  -Avoid/limit use of NSAID's, since they can cause GI upset, bleeding, and/or ulcers. If needed, take with food.  - if positive schedule EGD     History of Helicobacter pylori infection  -     H. pylori antigen, stool; Future; Expected date: 09/12/2023     History of peptic ulcer  -     H. pylori antigen, stool; Future; Expected date: 09/12/2023  -Avoid/limit use of NSAID's, since they can cause GI upset, bleeding, and/or ulcers. If needed, take with food.     History of gastroesophageal reflux (GERD)  -Avoid large meals, avoid eating within 2-3 hours of bedtime (avoid late night eating & lying down soon after eating), elevate head of bed if nocturnal symptoms are present, smoking cessation (if current smoker), & weight loss (if overweight).   -Avoid known foods which trigger reflux symptoms & to minimize/avoid high-fat foods, chocolate, caffeine, citrus, alcohol, & tomato products.  -Avoid/limit use of NSAID's, since they can cause GI upset, bleeding, and/or ulcers. If needed, take with food.     Follow up in about 4 weeks (around 10/10/2023), or if symptoms worsen or fail to improve.        Review of Systems   Constitutional:  Negative for activity change, appetite change, fatigue, fever and unexpected weight change.   HENT:  Negative for sore throat and trouble swallowing.    Respiratory:  Negative for cough and shortness of breath.    Cardiovascular:  Negative for chest pain.   Gastrointestinal:  Positive for abdominal pain, constipation  and rectal pain. Negative for abdominal distention, anal bleeding, blood in stool, diarrhea, nausea and vomiting.   Genitourinary:  Positive for difficulty urinating.   Musculoskeletal:  Positive for back pain.       No LMP for male patient.  Past Medical History:   Diagnosis Date    Colon polyp     Diverticulosis     Epidermoid cyst of testis     left    H. pylori infection     Hydrocele, left     IBS (irritable bowel syndrome)     Peptic ulcer disease     Shoulder pain, right      Past Surgical History:   Procedure Laterality Date    ADENOIDECTOMY      ARTHROSCOPIC ACROMIOPLASTY OF SHOULDER Right 09/10/2020    Procedure: ACROMIOPLASTY, ARTHROSCOPIC;  Surgeon: Gagan Astorga MD;  Location: [facility];  Service: Orthopedics;  Laterality: Right;    ARTHROSCOPIC DEBRIDEMENT OF SHOULDER Right 03/26/2021    Procedure: DEBRIDEMENT, SHOULDER, ARTHROSCOPIC;  Surgeon: Los Lopez MD;  Location: [facility];  Service: Orthopedics;  Laterality: Right;    COLONOSCOPY  06/2015    repeat in 5 years for surveillance    COLONOSCOPY N/A 06/04/2020    Procedure: COLONOSCOPY;  Surgeon: Sheila Adams MD;  Location: [facility];  Service: Endoscopy;  Laterality: N/A;    COLONOSCOPY N/A 10/13/2023    Procedure: COLONOSCOPY;  Surgeon: Sheila Adams MD;  Location: [facility];  Service: Endoscopy;  Laterality: N/A;    DISTAL CLAVICLE EXCISION Right 09/10/2020    Procedure: EXCISION, CLAVICLE, DISTAL;  Surgeon: Gagan Astorga MD;  Location: [facility];  Service: Orthopedics;  Laterality: Right;    ESOPHAGOGASTRODUODENOSCOPY N/A 06/04/2020    Procedure: EGD (ESOPHAGOGASTRODUODENOSCOPY);  Surgeon: Sheila Adams MD;  Location: [facility];  Service: Endoscopy;  Laterality: N/A;    FIXATION OF TENDON Right 03/26/2021    Procedure: FIXATION, TENDON - RIGHT SHOULDER;  Surgeon: Los Lopez MD;  Location: [facility];  Service: Orthopedics;  Laterality: Right;    KNEE ARTHROSCOPY W/ DEBRIDEMENT  2012    right knee     KNEE  ARTHROSCOPY W/ MENISCECTOMY Left 03/05/2021    Procedure: ARTHROSCOPY, KNEE, WITH MENISCECTOMY;  Surgeon: Los Lopez MD;  Location: [facility];  Service: Orthopedics;  Laterality: Left;    TONSILLECTOMY      UPPER GASTROINTESTINAL ENDOSCOPY  05/2015    repeat in 8 weeks to reassess ulcer for healing     Family History   Problem Relation Name Age of Onset    Diabetes Mother      Heart disease Mother  50        CABG x3    Melanoma Father      Cancer Maternal Grandfather  64        colon cancer    Colon cancer Maternal Grandfather  65    Crohn's disease Neg Hx      Ulcerative colitis Neg Hx      Celiac disease Neg Hx       Social History[1]  Wt Readings from Last 10 Encounters:   05/19/25 76.9 kg (169 lb 8.5 oz)   05/15/25 75 kg (165 lb 5.5 oz)   03/06/25 74.8 kg (164 lb 14.5 oz)   06/21/24 73.8 kg (162 lb 11.2 oz)   05/15/24 73.8 kg (162 lb 11.2 oz)   11/29/23 73.8 kg (162 lb 9.4 oz)   10/13/23 65.8 kg (145 lb)   10/04/23 70.8 kg (156 lb)   09/12/23 71 kg (156 lb 8.4 oz)   12/20/22 70.7 kg (155 lb 13.8 oz)     Lab Results   Component Value Date    WBC 6.98 05/15/2025    HGB 15.6 05/15/2025    HCT 46.4 05/15/2025    MCV 92 05/15/2025     05/15/2025     CMP  Sodium   Date Value Ref Range Status   05/15/2025 140 136 - 145 mmol/L Final     Potassium   Date Value Ref Range Status   05/15/2025 4.2 3.5 - 5.1 mmol/L Final     Chloride   Date Value Ref Range Status   05/15/2025 104 95 - 110 mmol/L Final     CO2   Date Value Ref Range Status   05/15/2025 26 23 - 29 mmol/L Final     Glucose   Date Value Ref Range Status   05/15/2025 102 70 - 110 mg/dL Final     BUN   Date Value Ref Range Status   05/15/2025 15 6 - 20 mg/dL Final     Creatinine   Date Value Ref Range Status   05/15/2025 1.1 0.5 - 1.4 mg/dL Final     Calcium   Date Value Ref Range Status   05/15/2025 9.0 8.7 - 10.5 mg/dL Final     Protein Total   Date Value Ref Range Status   05/15/2025 7.1 6.0 - 8.4 gm/dL Final     Albumin   Date Value Ref Range  "Status   05/15/2025 3.8 3.5 - 5.2 g/dL Final     Bilirubin Total   Date Value Ref Range Status   05/15/2025 0.5 0.1 - 1.0 mg/dL Final     Comment:     For infants and newborns, interpretation of results should be based   on gestational age, weight and in agreement with clinical   observations.    Premature Infant recommended reference ranges:   0-24 hours:  <8.0 mg/dL   24-48 hours: <12.0 mg/dL   3-5 days:    <15.0 mg/dL   6-29 days:   <15.0 mg/dL     ALP   Date Value Ref Range Status   05/15/2025 50 40 - 150 unit/L Final     AST   Date Value Ref Range Status   05/15/2025 32 11 - 45 unit/L Final     ALT   Date Value Ref Range Status   05/15/2025 39 10 - 44 unit/L Final     Anion Gap   Date Value Ref Range Status   05/15/2025 10 8 - 16 mmol/L Final     eGFR if    Date Value Ref Range Status   04/22/2021 >60 >60 mL/min/1.73 m^2 Final     eGFR if non    Date Value Ref Range Status   04/22/2021 >60 >60 mL/min/1.73 m^2 Final     Comment:     Calculation used to obtain the estimated glomerular filtration  rate (eGFR) is the CKD-EPI equation.        Lab Results   Component Value Date    AMYLASE 22 01/31/2016     Lab Results   Component Value Date    LIPASE 26 05/15/2025     No results found for: "LIPASERES"  Lab Results   Component Value Date    TSH 1.551 03/07/2025       Reviewed prior medical records including radiology report of ER visits 5/15 and 5/16 & endoscopy history (see surgical history).    Objective:      Physical Exam  Vitals and nursing note reviewed.   Constitutional:       General: He is not in acute distress.     Appearance: He is not ill-appearing.   HENT:      Head: Normocephalic and atraumatic.      Mouth/Throat:      Mouth: Mucous membranes are moist.      Pharynx: Oropharynx is clear.   Eyes:      Conjunctiva/sclera: Conjunctivae normal.   Cardiovascular:      Rate and Rhythm: Normal rate and regular rhythm.      Pulses: Normal pulses.   Pulmonary:      Effort: " Pulmonary effort is normal. No respiratory distress.   Abdominal:      General: Abdomen is flat. There is no distension.      Palpations: Abdomen is soft.      Tenderness: There is no abdominal tenderness.   Skin:     General: Skin is warm and dry.      Capillary Refill: Capillary refill takes 2 to 3 seconds.   Neurological:      Mental Status: He is alert and oriented to person, place, and time.         Assessment:       1. Constipation, unspecified constipation type    2. Anal fissure    3. Rectal pain        Plan:       Constipation, unspecified constipation type  Recommend daily exercise as tolerated, adequate water intake (six 8-oz glasses of water daily), and high fiber diet. OTC fiber supplements are recommended if diet does not reach daily fiber goal (20-30 grams daily), such as Metamucil, Citrucel, or FiberCon (take as directed, separate from other oral medications by >2 hours).  -Recommend taking an OTC stool softener such as Colace as directed to avoid hard stools and straining with bowel movements PRN  -Recommend trying OTC MiraLax once daily (17g PO) as directed  - If no improvement with above recommendations, try intermittently dosed Dulcolax OTC as directed (every 3-4  days) PRN to facilitate bowel movements  -If still no improvement with these measures, call/follow-up    discussed medication with patient & that it can cause diarrhea for the first week or so, but once your body adjusts to the medication, the diarrhea should resolve; if severe diarrhea occurs or if it persist, please notify our office, patient verbalized understanding     -     X-Ray Abdomen AP 1 View; Future; Expected date: 05/19/2025  -     linaCLOtide (LINZESS) 72 mcg Cap capsule; Take 1 capsule (72 mcg total) by mouth before breakfast.  Dispense: 30 capsule; Refill: 11    Anal fissure & Rectal pain  See CRS if pain does not improve with use of compounded medication  -     Ambulatory referral/consult to Gastroenterology  -      nifedipine 0.3%-lidocaine 1.5% topical ointment; Apply topically 3 (three) times daily. Apply a pea sized amount three times a day to the anus.  Dispense: 30 g; Refill: 1        Follow up if symptoms worsen or fail to improve.      If no improvement in symptoms or symptoms worsen, call/follow-up at clinic or go to ER.       LYDIA Morales, PEG-C    Encounter includes face to face time and non-face to face time preparing to see the patient (eg, review of tests), obtaining and/or reviewing separately obtained history, documenting clinical information in the electronic or other health record, independently interpreting results (not separately reported) and communicating results to the patient/family/caregiver, or care coordination (not separately reported).     A dictation software program was used for this note. Please expect some simple typographical errors in this note.         [1]   Social History  Tobacco Use    Smoking status: Never     Passive exposure: Never    Smokeless tobacco: Never   Substance Use Topics    Alcohol use: No    Drug use: No

## 2025-05-21 ENCOUNTER — TELEPHONE (OUTPATIENT)
Dept: UROLOGY | Facility: CLINIC | Age: 50
End: 2025-05-21
Payer: COMMERCIAL

## 2025-05-21 ENCOUNTER — OFFICE VISIT (OUTPATIENT)
Dept: UROLOGY | Facility: CLINIC | Age: 50
End: 2025-05-21
Attending: UROLOGY
Payer: COMMERCIAL

## 2025-05-21 VITALS
DIASTOLIC BLOOD PRESSURE: 78 MMHG | WEIGHT: 168.75 LBS | BODY MASS INDEX: 27.12 KG/M2 | HEART RATE: 83 BPM | HEIGHT: 66 IN | SYSTOLIC BLOOD PRESSURE: 114 MMHG | OXYGEN SATURATION: 98 %

## 2025-05-21 DIAGNOSIS — K62.89 RECTAL PAIN: ICD-10-CM

## 2025-05-21 DIAGNOSIS — M54.50 ACUTE MIDLINE LOW BACK PAIN WITHOUT SCIATICA: ICD-10-CM

## 2025-05-21 DIAGNOSIS — R39.11 URINARY HESITANCY: ICD-10-CM

## 2025-05-21 DIAGNOSIS — R39.12 WEAK URINARY STREAM: Primary | ICD-10-CM

## 2025-05-21 LAB
BILIRUB SERPL-MCNC: NORMAL MG/DL
BLOOD URINE, POC: NORMAL
CLARITY, POC UA: CLEAR
COLOR, POC UA: NORMAL
GLUCOSE UR QL STRIP: NORMAL
KETONES UR QL STRIP: NORMAL
LEUKOCYTE ESTERASE URINE, POC: NORMAL
NITRITE, POC UA: NORMAL
PH, POC UA: 8
POC RESIDUAL URINE VOLUME: 48 ML (ref 0–100)
PROTEIN, POC: NORMAL
SPECIFIC GRAVITY, POC UA: 1
UROBILINOGEN, POC UA: NORMAL

## 2025-05-21 RX ORDER — TAMSULOSIN HYDROCHLORIDE 0.4 MG/1
0.4 CAPSULE ORAL DAILY
Qty: 30 CAPSULE | Refills: 11 | Status: SHIPPED | OUTPATIENT
Start: 2025-05-21 | End: 2026-05-16

## 2025-05-21 NOTE — TELEPHONE ENCOUNTER
----- Message from Oliver sent at 5/21/2025  7:47 AM CDT -----  Type:  Same Day Appointment RequestCaller is requesting a same day appointment.  Caller declined first available appointment listed below.  Name of Caller:Pts  wife, Eduardo is the first available appointment?he has an appt 05/23/25Symptoms:Heightened pain in his lower back area, has testicular soreness, slow stream Urinary hesitancy Best Call Back Number:[phone] will see anyone. Additional Information: Patient has been seen twice last week in the [facility] ER and multiple tests were ran, had fever at that time but has none now. He will go to [place] or wherever to see someone today because he is in a bit of agony, hasn't slept hardly in three nights, Pain is worse at night.    Please call back to advise. Thanks!

## 2025-05-21 NOTE — TELEPHONE ENCOUNTER
----- Message from Oliver sent at 5/21/2025  7:55 AM CDT -----  Type:  Same Day Appointment RequestCaller is requesting a same day appointment.  Caller declined first available appointment listed below.  Name of Caller: Pts wife, Eduardo is the first available appointment?He has a appt for 05/23/25Symptoms:Urinary hesitancy, slow stream, mid to lower back pain, testicular sorenessBest Call Back Number:[phone] Information: Pt has had a great deal of low back pain, testicular soreness, urinary hesitancy, slow to no stream. Went to [facility], ER twice last week and had had three nights of loss of sleep due to pain. Wife would like to have him worked in anywhere with any provider just to give him some relief.    Please call back to advise. Thanks!

## 2025-05-21 NOTE — TELEPHONE ENCOUNTER
Spoke with patient, requesting same day appointment. No same day appointment available. Offered next day 8 am, patient refused. Decided to keep appointment scheduled for Friday

## 2025-05-21 NOTE — PROGRESS NOTES
"Subjective:       Rios Buck is a 49 y.o. male who is a new patient who was seen  for evaluation of testicular pain.      Seen in ER x 2 recently with rectal pain, diarrhea, and fever. He was referred to GI/general surgery. Prior to ER he was on Augmentin then cefdinir for ear pain/infection. Diarrhea noted with abx. ER then gave Cipro/Flagyl. Urologic symptoms now include testicular soreness, dysuria, hesitancy, weak stream. Now with constipation.     He reports hesitancy and pain with urination that started when he started Augmentin. Notes severe back pain typically at night. Hesitancy worse at that time. +weak stream, straining, intermittent stream. Denies ANGIE after straining. Denies hematuria. Nocturia x 10. +night sweats.     Saw GI this week for severe rectal pain, found to have anal fissure.     He reports on TRT (injections) from outside source - IM injections twice weekly.     PVR (bladder scan) today - 48cc      CT a/p 5/25 - large stool burden. Mild L pelviectasis on read - minimal on my interpretation.       The following portions of the patient's history were reviewed and updated as appropriate: allergies, current medications, past family history, past medical history, past social history, past surgical history and problem list.    Review of Systems  Twelve point review of systems completed. Pertinent positive and negatives listed in HPI.      Objective:    Vitals: /78 (BP Location: Left arm, Patient Position: Sitting)   Pulse 83   Ht 5' 6" (1.676 m)   Wt 76.5 kg (168 lb 12.2 oz)   SpO2 98%   BMI 27.24 kg/m²     Physical Exam   General: well developed, well nourished in no acute distress  Head: normocephalic, atraumatic  Neck: no obvious enlargement of thyroid  HEENT: EOMI, mucus membranes moist, sclera anicteric, no hearing impairment  Lungs: symmetric expansion, non-labored breathing  Neuro: alert and oriented x 3, no gross deficits  Abd: soft, NT, ND. Bladder nonpalpable. No CVA " "tenderness. No current low back tenderness/  Psych: normal judgment and insight, normal mood/affect and non-anxious  Genitourinary:   Penis - circumcised penis without plaques, lesions, or scarring.  Urethra - orthotopic location without moderate stenosis. Small indention in corona ventrally, blind ending, no hypospadias.  Scrotum  - no lesions or rashes, no hydrocele or hernia.  Testes - descended bilaterally, symmetric without masses, non tender.  Epididymides - no cysts or lesions, no spermatocele, symmetric.   TOBIN: deferred due to current anal fissure/pain      Lab Review   Urine analysis today in clinic shows +trace protein     Lab Results   Component Value Date    WBC 6.98 05/15/2025    HGB 15.6 05/15/2025    HCT 46.4 05/15/2025    MCV 92 05/15/2025     05/15/2025     Lab Results   Component Value Date    CREATININE 1.1 05/15/2025    BUN 15 05/15/2025     Lab Results   Component Value Date    PSA 0.99 03/07/2025     No results found for: "PSADIAG"    Imaging  CT reviewed  Reports and images were personally reviewed by me and discussed with the patient today         Assessment/Plan:      1. Weak urinary stream    - May be related to rectal pain/constipation   - PVR reassuring   - Severe LUTS   - Start Flomax now. Discussed r/b/a.   - UA without abnormality   - Narrow urethral meatus noted on exam, no other abnormality on exam   - Avoid/treat constipation     2. Urinary hesitancy    - Flomax     3. Acute midline low back pain without sciatica    - Unsure etiology   - CT uro now     4. Rectal pain    - Anal fissure recently diagnosed       Follow up pending CT         "

## 2025-05-22 ENCOUNTER — RESULTS FOLLOW-UP (OUTPATIENT)
Dept: SPINE | Facility: CLINIC | Age: 50
End: 2025-05-22

## 2025-05-22 ENCOUNTER — HOSPITAL ENCOUNTER (OUTPATIENT)
Dept: RADIOLOGY | Facility: HOSPITAL | Age: 50
Discharge: HOME | End: 2025-05-22
Attending: PHYSICAL MEDICINE & REHABILITATION
Payer: COMMERCIAL

## 2025-05-22 ENCOUNTER — PATIENT MESSAGE (OUTPATIENT)
Dept: SPINE | Facility: CLINIC | Age: 50
End: 2025-05-22

## 2025-05-22 ENCOUNTER — OFFICE VISIT (OUTPATIENT)
Dept: SPINE | Facility: CLINIC | Age: 50
End: 2025-05-22
Payer: COMMERCIAL

## 2025-05-22 VITALS — WEIGHT: 168.63 LBS | BODY MASS INDEX: 27.1 KG/M2 | HEIGHT: 66 IN

## 2025-05-22 DIAGNOSIS — M54.50 ACUTE MIDLINE LOW BACK PAIN WITHOUT SCIATICA: Primary | ICD-10-CM

## 2025-05-22 DIAGNOSIS — M54.50 ACUTE MIDLINE LOW BACK PAIN WITHOUT SCIATICA: ICD-10-CM

## 2025-05-22 PROCEDURE — 96372 THER/PROPH/DIAG INJ SC/IM: CPT | Mod: S$GLB,,, | Performed by: PHYSICAL MEDICINE & REHABILITATION

## 2025-05-22 PROCEDURE — 99999 PR PBB SHADOW E&M-EST. PATIENT-LVL III: CPT | Mod: PBBFAC,,, | Performed by: PHYSICAL MEDICINE & REHABILITATION

## 2025-05-22 PROCEDURE — 72114 X-RAY EXAM L-S SPINE BENDING: CPT | Mod: 26,,, | Performed by: RADIOLOGY

## 2025-05-22 PROCEDURE — 72114 X-RAY EXAM L-S SPINE BENDING: CPT | Mod: TC

## 2025-05-22 PROCEDURE — 99204 OFFICE O/P NEW MOD 45 MIN: CPT | Mod: 25,S$GLB,, | Performed by: PHYSICAL MEDICINE & REHABILITATION

## 2025-05-22 RX ORDER — CYCLOBENZAPRINE HCL 5 MG
TABLET ORAL
Qty: 30 TABLET | Refills: 0 | Status: SHIPPED | OUTPATIENT
Start: 2025-05-22

## 2025-05-22 RX ORDER — KETOROLAC TROMETHAMINE 30 MG/ML
30 INJECTION, SOLUTION INTRAMUSCULAR; INTRAVENOUS
Status: COMPLETED | OUTPATIENT
Start: 2025-05-22 | End: 2025-05-22

## 2025-05-22 RX ORDER — METHYLPREDNISOLONE 4 MG/1
TABLET ORAL
Qty: 1 EACH | Refills: 0 | Status: SHIPPED | OUTPATIENT
Start: 2025-05-22 | End: 2025-06-12

## 2025-05-22 RX ORDER — METHYLPREDNISOLONE ACETATE 40 MG/ML
40 INJECTION, SUSPENSION INTRA-ARTICULAR; INTRALESIONAL; INTRAMUSCULAR; SOFT TISSUE
Status: COMPLETED | OUTPATIENT
Start: 2025-05-22 | End: 2025-05-22

## 2025-05-22 RX ADMIN — KETOROLAC TROMETHAMINE 30 MG: 30 INJECTION, SOLUTION INTRAMUSCULAR; INTRAVENOUS at 01:05

## 2025-05-22 RX ADMIN — METHYLPREDNISOLONE ACETATE 40 MG: 40 INJECTION, SUSPENSION INTRA-ARTICULAR; INTRALESIONAL; INTRAMUSCULAR; SOFT TISSUE at 01:05

## 2025-05-22 NOTE — PROGRESS NOTES
SUBJECTIVE:    Patient ID: Rios Buck is a 49 y.o. male.    Chief Complaint: No chief complaint on file.    This is a 49-year-old man who has no primary care provider.  Denies any chronic major medical problems.  No cancer history.  No history of back problems.  Presents with a 3 day history of spontaneously occurring pain at the lumbosacral junction without radicular symptoms.  Does not bother him at all during the day but as soon as he lies down at night he develops pain in that area.  Goes away soon as he gets up.  No changes to his bowel or bladder function.  No fever chills sweats or unexpected weight loss.  He has tried Tylenol and Motrin but has not improved to his satisfaction in fact he thinks he is getting worse.  His current pain level is 0/10 but at times as high as 10/10 and interferes with his quality of life especially in terms of sleep.  I personally reviewed a CT of the abdomen and pelvis done 05/15/2025 which shows minimal degenerative changes.          Past Medical History:   Diagnosis Date    Colon polyp     Diverticulosis     Epidermoid cyst of testis     left    H. pylori infection     Hydrocele, left     IBS (irritable bowel syndrome)     Peptic ulcer disease     Shoulder pain, right      Social History[1]  Past Surgical History:   Procedure Laterality Date    ADENOIDECTOMY      ARTHROSCOPIC ACROMIOPLASTY OF SHOULDER Right 09/10/2020    Procedure: ACROMIOPLASTY, ARTHROSCOPIC;  Surgeon: Gagan Astorga MD;  Location: [facility] OR;  Service: Orthopedics;  Laterality: Right;    ARTHROSCOPIC DEBRIDEMENT OF SHOULDER Right 03/26/2021    Procedure: DEBRIDEMENT, SHOULDER, ARTHROSCOPIC;  Surgeon: Los Lopez MD;  Location: [facility] OR;  Service: Orthopedics;  Laterality: Right;    COLONOSCOPY  06/2015    repeat in 5 years for surveillance    COLONOSCOPY N/A 06/04/2020    Procedure: COLONOSCOPY;  Surgeon: Sheila Adams MD;  Location: [facility];  Service: Endoscopy;  Laterality: N/A;  "   COLONOSCOPY N/A 10/13/2023    Procedure: COLONOSCOPY;  Surgeon: Sheila Adams MD;  Location: [facility] ENDO;  Service: Endoscopy;  Laterality: N/A;    DISTAL CLAVICLE EXCISION Right 09/10/2020    Procedure: EXCISION, CLAVICLE, DISTAL;  Surgeon: Gagan Astorga MD;  Location: [facility] OR;  Service: Orthopedics;  Laterality: Right;    ESOPHAGOGASTRODUODENOSCOPY N/A 06/04/2020    Procedure: EGD (ESOPHAGOGASTRODUODENOSCOPY);  Surgeon: Sheila Adams MD;  Location: [facility] ENDO;  Service: Endoscopy;  Laterality: N/A;    FIXATION OF TENDON Right 03/26/2021    Procedure: FIXATION, TENDON - RIGHT SHOULDER;  Surgeon: Los Lopez MD;  Location: [facility];  Service: Orthopedics;  Laterality: Right;    KNEE ARTHROSCOPY W/ DEBRIDEMENT  2012    right knee     KNEE ARTHROSCOPY W/ MENISCECTOMY Left 03/05/2021    Procedure: ARTHROSCOPY, KNEE, WITH MENISCECTOMY;  Surgeon: Los Lopez MD;  Location: [facility];  Service: Orthopedics;  Laterality: Left;    TONSILLECTOMY      UPPER GASTROINTESTINAL ENDOSCOPY  05/2015    repeat in 8 weeks to reassess ulcer for healing     Family History   Problem Relation Name Age of Onset    Diabetes Mother      Heart disease Mother  50        CABG x3    Melanoma Father      Cancer Maternal Grandfather  64        colon cancer    Colon cancer Maternal Grandfather  65    Crohn's disease Neg Hx      Ulcerative colitis Neg Hx      Celiac disease Neg Hx       Vitals:    05/22/25 1053   Weight: 76.5 kg (168 lb 10.4 oz)   Height: 5' 6" (1.676 m)       Review of Systems   Constitutional:  Negative for chills, diaphoresis, fatigue, fever and unexpected weight change.   HENT:  Negative for trouble swallowing.    Eyes:  Negative for visual disturbance.   Respiratory:  Negative for shortness of breath.    Cardiovascular:  Negative for chest pain.   Gastrointestinal:  Negative for abdominal pain, constipation, diarrhea, nausea and vomiting.   Genitourinary:  Negative for difficulty urinating. "   Musculoskeletal:  Negative for arthralgias, back pain, gait problem, joint swelling, myalgias, neck pain and neck stiffness.   Neurological:  Negative for dizziness, speech difficulty, weakness, light-headedness, numbness and headaches.          Objective:      Physical Exam  Neurological:      Mental Status: He is alert and oriented to person, place, and time.      Comments: A healthy-appearing man in no acute distress  No point tenderness or palpable masses about the lumbar spine  Forward flexion of the lumbar spine is normal and painless.  Extension at 10° causes mild pain at the lumbosacral junction  He can heel and toe walk normally  Reflexes- +1-+2 reflexes at the following:   C5-Biceps   C6-Brachioradialis   C7-Triceps   L3/4-Patellar   S1-Achilles   Geoff sign is negative bilaterally  Strength testing- 5/5 strength in the following muscle groups:  C5-Elbow flexion  C6-Wrist extension  C7-Elbow extension  C8-Finger flexion  T1-Finger abduction  L2-Hip flexion  L3-Knee extension  L4-Ankle dorsiflexion  L5-Great toe extension  S1-Ankle plantar flexion                    Assessment:       1. Acute midline low back pain without sciatica           Plan:     He has a nonfocal neurological examination and no historical red flags.  Etiology of his discomfort is not clear but I suspect a benign cause.  I think he can be treated conservatively.  I am going to give him a shot of Depo-Medrol and Toradol followed by a Medrol Dosepak and add Flexeril at night for pain.  We will get some x-rays.  Check on him by phone in about 1 week to see how he is coming along.  Consider physical therapy.  Consider MRI if he fails to respond to the steroids.      Acute midline low back pain without sciatica  -     X-Ray Lumbar Complete Including Flex And Ext; Future; Expected date: 05/22/2025    Other orders  -     methylPREDNISolone acetate injection 40 mg  -     ketorolac injection 30 mg  -     methylPREDNISolone (MEDROL DOSEPACK)  4 mg tablet; use as directed  Dispense: 1 each; Refill: 0  -     cyclobenzaprine (FLEXERIL) 5 MG tablet; Take 1-2 tablets nightly as needed for back pain/spasms  Dispense: 30 tablet; Refill: 0               [1]   Social History  Socioeconomic History    Marital status:    Tobacco Use    Smoking status: Never     Passive exposure: Never    Smokeless tobacco: Never   Substance and Sexual Activity    Alcohol use: No    Drug use: No    Sexual activity: Not Currently     Social Drivers of Health     Financial Resource Strain: Low Risk  (5/14/2025)    Overall Financial Resource Strain (CARDIA)     Difficulty of Paying Living Expenses: Not hard at all   Food Insecurity: No Food Insecurity (5/14/2025)    Hunger Vital Sign     Worried About Running Out of Food in the Last Year: Never true     Ran Out of Food in the Last Year: Never true   Transportation Needs: No Transportation Needs (5/14/2025)    PRAPARE - Transportation     Lack of Transportation (Medical): No     Lack of Transportation (Non-Medical): No   Physical Activity: Sufficiently Active (5/14/2025)    Exercise Vital Sign     Days of Exercise per Week: 5 days     Minutes of Exercise per Session: 60 min   Stress: No Stress Concern Present (5/14/2025)    [employer] - Occupational Stress Questionnaire     Feeling of Stress : Not at all   Housing Stability: Low Risk  (5/14/2025)    Housing Stability Vital Sign     Unable to Pay for Housing in the Last Year: No     Homeless in the Last Year: No

## 2025-05-28 ENCOUNTER — PATIENT MESSAGE (OUTPATIENT)
Dept: SPINE | Facility: CLINIC | Age: 50
End: 2025-05-28
Payer: COMMERCIAL

## 2025-05-28 ENCOUNTER — TELEPHONE (OUTPATIENT)
Dept: SPINE | Facility: CLINIC | Age: 50
End: 2025-05-28
Payer: COMMERCIAL

## 2025-05-28 DIAGNOSIS — M54.9 DORSALGIA, UNSPECIFIED: Primary | ICD-10-CM

## 2025-05-28 DIAGNOSIS — M54.50 ACUTE MIDLINE LOW BACK PAIN WITHOUT SCIATICA: ICD-10-CM

## 2025-05-28 NOTE — TELEPHONE ENCOUNTER
MRI and PT orders placed. Pt advised he will receive a call for PT scheduling. MRI and MRI follow up scheduled and pt has been advised of time and verbalized understanding. Pt will reach out to office with further questions or concerns.

## 2025-05-28 NOTE — TELEPHONE ENCOUNTER
----- Message from Raymond Haji MD sent at 5/28/2025  8:16 AM CDT -----  Therapy and MRI order submitted  ----- Message -----  From: Suyapa Del Angel LPN  Sent: 5/28/2025   8:08 AM CDT  To: Raymond Haji MD    He would like PT and MRI internal

## 2025-06-03 ENCOUNTER — PATIENT MESSAGE (OUTPATIENT)
Dept: UROLOGY | Facility: CLINIC | Age: 50
End: 2025-06-03
Payer: COMMERCIAL

## 2025-06-03 DIAGNOSIS — M54.50 ACUTE MIDLINE LOW BACK PAIN WITHOUT SCIATICA: Primary | ICD-10-CM

## 2025-06-03 DIAGNOSIS — N28.89 PELVIECTASIS OF KIDNEY: ICD-10-CM

## 2025-06-05 ENCOUNTER — OFFICE VISIT (OUTPATIENT)
Dept: UROLOGY | Facility: CLINIC | Age: 50
End: 2025-06-05
Attending: UROLOGY
Payer: COMMERCIAL

## 2025-06-05 VITALS — BODY MASS INDEX: 27.56 KG/M2 | WEIGHT: 170.75 LBS

## 2025-06-05 DIAGNOSIS — N28.89 PELVIECTASIS OF KIDNEY: ICD-10-CM

## 2025-06-05 DIAGNOSIS — R39.12 WEAK URINARY STREAM: Primary | ICD-10-CM

## 2025-06-05 DIAGNOSIS — R30.0 DYSURIA: ICD-10-CM

## 2025-06-05 DIAGNOSIS — R33.9 INCOMPLETE BLADDER EMPTYING: ICD-10-CM

## 2025-06-05 DIAGNOSIS — M54.50 ACUTE MIDLINE LOW BACK PAIN WITHOUT SCIATICA: ICD-10-CM

## 2025-06-05 PROCEDURE — 87798 DETECT AGENT NOS DNA AMP: CPT | Performed by: UROLOGY

## 2025-06-05 PROCEDURE — 87563 M. GENITALIUM AMP PROBE: CPT | Performed by: UROLOGY

## 2025-06-05 PROCEDURE — 99999 PR PBB SHADOW E&M-EST. PATIENT-LVL III: CPT | Mod: PBBFAC,,, | Performed by: UROLOGY

## 2025-06-05 PROCEDURE — 99214 OFFICE O/P EST MOD 30 MIN: CPT | Mod: S$GLB,,, | Performed by: UROLOGY

## 2025-06-07 LAB
MYCOPLASMA GENITALIUM RESULT: NEGATIVE
SPECIMEN SOURCE: NORMAL

## 2025-06-09 ENCOUNTER — RESULTS FOLLOW-UP (OUTPATIENT)
Dept: UROLOGY | Facility: CLINIC | Age: 50
End: 2025-06-09
Payer: COMMERCIAL

## 2025-06-12 ENCOUNTER — TELEPHONE (OUTPATIENT)
Dept: UROLOGY | Facility: CLINIC | Age: 50
End: 2025-06-12
Payer: COMMERCIAL

## 2025-06-12 NOTE — TELEPHONE ENCOUNTER
Spoke with Mrs. Buck, she stated patient is doing much better the symptoms has subsided. She also stated they have an approval for CT Urogram w/o contrast. But since the patient is not having issues, he has canceled his upcoming appt with provider. If issues resurface she will document and return to the ER or Urgent care for further documentation of s/s.  We thank you and the provider for doing your best to help us get the approvals we need for the exams needed.  Again thank you for your help.  Thank you for you patience.  JESUS BLANCO

## 2025-07-01 ENCOUNTER — TELEPHONE (OUTPATIENT)
Dept: UROLOGY | Facility: CLINIC | Age: 50
End: 2025-07-01
Payer: COMMERCIAL

## (undated) DEVICE — COVER LIGHT HANDLE LB53

## (undated) DEVICE — TRAY SKIN PREP DRY

## (undated) DEVICE — PACK SHOULDER 88491

## (undated) DEVICE — SLING ARM LARGE BLUE

## (undated) DEVICE — GLOVE BIOGEL ECLIPSE SZ 7.5

## (undated) DEVICE — GOWN B1 X-LG X-LONG

## (undated) DEVICE — SPONGE COTTON TRAY 4X4IN

## (undated) DEVICE — TUBE SET INFLOW/OUTFLOW

## (undated) DEVICE — PAD ABD 8X10 STERILE

## (undated) DEVICE — BLADE OSCILLATING KM3-111

## (undated) DEVICE — SEE MEDLINE ITEM 157166

## (undated) DEVICE — SPONGE GAUZE 10S 4X4  442214

## (undated) DEVICE — DRESSING TRANS 4X4 TEGADERM

## (undated) DEVICE — DRAPE INCISE IOBAN 2 23X17IN

## (undated) DEVICE — DRAPE U-SLOT 1019

## (undated) DEVICE — SEE MEDLINE ITEM 146231

## (undated) DEVICE — Device

## (undated) DEVICE — ALCOHOL 70% ISOP W/GREEN 16OZ

## (undated) DEVICE — ELECTRODE REM PLYHSV RETURN 9

## (undated) DEVICE — UNDERGLOVES BIOGEL PI SZ 7 LF

## (undated) DEVICE — PAD SHOULDER CARE POLAR

## (undated) DEVICE — SEE MEDLINE ITEM 157169

## (undated) DEVICE — CLOSURE SKIN STERI STRIP 1/2X4

## (undated) DEVICE — WAND COBLATION TURBOVAC 90

## (undated) DEVICE — PROBE MULTI PORT RF 90 DEGREE

## (undated) DEVICE — BANDAGE COBAN 6 CBN1106

## (undated) DEVICE — BRUSH SCRUB HIBICLENS 4%

## (undated) DEVICE — COVER MAYO STAND REINFRCD 30

## (undated) DEVICE — DRAPE STERI U-SHAPED 47X51IN

## (undated) DEVICE — DRAPE STERI INSTRUMENT 1018

## (undated) DEVICE — SUT PDS II 0 CT VIL MONO 36

## (undated) DEVICE — BLADE AGRESSIVE PLUS 4.0 375-544-000

## (undated) DEVICE — SET BASIN O R 31451126

## (undated) DEVICE — TUBING CYSTO DOUBLE 654301

## (undated) DEVICE — SUTURE VICRYL 3-0 SH 27 VCP416H

## (undated) DEVICE — GOWN X-LARGE 044674

## (undated) DEVICE — GLOVE BIOGEL SKINSENSE PI 6.5

## (undated) DEVICE — SUTURE VICRYL 2-0 27 SH VCP417H

## (undated) DEVICE — APPLICATOR CHLORAPREP ORN 26ML

## (undated) DEVICE — ALCOHOL ISOPROPYL BLU 70% 16OZ

## (undated) DEVICE — KIT TRIMANO CHIN

## (undated) DEVICE — ADHESIVE DERMABOND SKIN DNX12

## (undated) DEVICE — TAPE MEDIPORE 3 X 10YD

## (undated) DEVICE — SOLUTION IRRI NS BOTTLE 1000ML R5200-01

## (undated) DEVICE — SLING SHOT II LARGE

## (undated) DEVICE — ADHESIVE DERMABOND ADVANCED

## (undated) DEVICE — PREP CHLORA 26ML

## (undated) DEVICE — SUT ETHILON 3-0 PS2 18 BLK

## (undated) DEVICE — SEE MEDLINE ITEM 146271

## (undated) DEVICE — SUT VICRYL 2-0 36 CT-1

## (undated) DEVICE — CANNULA SHOULDER 9718

## (undated) DEVICE — SEE L#120831

## (undated) DEVICE — PAD BOVIE ADULT

## (undated) DEVICE — SUT 3-0 ETHILON 18 FS-1

## (undated) DEVICE — BLADE 90-S SERFAS 3.5 279-351-100

## (undated) DEVICE — SEE MEDLINE ITEM 152523

## (undated) DEVICE — ADHESIVE MASTISOL VIAL 48/BX

## (undated) DEVICE — NDL 18GA X1 1/2 REG BEVEL

## (undated) DEVICE — UNDERGLOVES BIOGEL PI SIZE 8

## (undated) DEVICE — SYR 30CC LUER LOCK

## (undated) DEVICE — PADDING CAST 6IN DELTA ROLL

## (undated) DEVICE — GLOVE BIOGEL PI   GOLD SIZE 8

## (undated) DEVICE — BLADE SHAVER LANZA 4.2X13CM

## (undated) DEVICE — BLADE SHAVER 4.5 6/BX

## (undated) DEVICE — SOL IRR NACL .9% 3000ML

## (undated) DEVICE — DRESSING XEROFORM FOIL PK 1X8

## (undated) DEVICE — DRAPE IMPERVIOUS SPLIT 89331

## (undated) DEVICE — SUTURE MONOCRYL 3-0 27 PS-1 MCP936H

## (undated) DEVICE — SUT MONOCRYL 3-0 PS-2 UND

## (undated) DEVICE — DRAPE PLASTIC U 60X72

## (undated) DEVICE — STOCKINETTE IMPERVIOUS 12X48 STK6248

## (undated) DEVICE — KIT TRIMANO

## (undated) DEVICE — TOURNIQUET SB QC DP 34X4IN

## (undated) DEVICE — UNDERGLOVE BIOGEL PI SZ 6.5 LF

## (undated) DEVICE — GAUZE SPONGE 4X4 12PLY